# Patient Record
Sex: FEMALE | Race: WHITE | NOT HISPANIC OR LATINO | Employment: OTHER | URBAN - METROPOLITAN AREA
[De-identification: names, ages, dates, MRNs, and addresses within clinical notes are randomized per-mention and may not be internally consistent; named-entity substitution may affect disease eponyms.]

---

## 2017-01-03 ENCOUNTER — TRANSCRIBE ORDERS (OUTPATIENT)
Dept: ADMINISTRATIVE | Facility: HOSPITAL | Age: 62
End: 2017-01-03

## 2017-01-03 DIAGNOSIS — M25.561 RIGHT KNEE PAIN, UNSPECIFIED CHRONICITY: Primary | ICD-10-CM

## 2017-01-03 DIAGNOSIS — M25.469 KNEE SWELLING: ICD-10-CM

## 2017-01-06 ENCOUNTER — GENERIC CONVERSION - ENCOUNTER (OUTPATIENT)
Dept: OTHER | Facility: OTHER | Age: 62
End: 2017-01-06

## 2017-01-10 ENCOUNTER — ALLSCRIPTS OFFICE VISIT (OUTPATIENT)
Dept: OTHER | Facility: OTHER | Age: 62
End: 2017-01-10

## 2017-01-10 DIAGNOSIS — Z11.59 ENCOUNTER FOR SCREENING FOR OTHER VIRAL DISEASES: ICD-10-CM

## 2017-01-10 DIAGNOSIS — L43.9 LICHEN PLANUS: ICD-10-CM

## 2017-03-23 ENCOUNTER — GENERIC CONVERSION - ENCOUNTER (OUTPATIENT)
Dept: OTHER | Facility: OTHER | Age: 62
End: 2017-03-23

## 2017-03-23 DIAGNOSIS — Z12.39 ENCOUNTER FOR OTHER SCREENING FOR MALIGNANT NEOPLASM OF BREAST: ICD-10-CM

## 2017-07-10 DIAGNOSIS — E78.5 HYPERLIPIDEMIA: ICD-10-CM

## 2017-07-10 DIAGNOSIS — E55.9 VITAMIN D DEFICIENCY: ICD-10-CM

## 2017-07-10 DIAGNOSIS — I10 ESSENTIAL (PRIMARY) HYPERTENSION: ICD-10-CM

## 2017-07-10 DIAGNOSIS — Z11.59 ENCOUNTER FOR SCREENING FOR OTHER VIRAL DISEASES: ICD-10-CM

## 2017-07-10 DIAGNOSIS — F32.9 MAJOR DEPRESSIVE DISORDER, SINGLE EPISODE: ICD-10-CM

## 2017-07-21 ENCOUNTER — HOSPITAL ENCOUNTER (EMERGENCY)
Facility: HOSPITAL | Age: 62
Discharge: HOME | End: 2017-07-21
Attending: EMERGENCY MEDICINE
Payer: COMMERCIAL

## 2017-07-21 VITALS
BODY MASS INDEX: 30.73 KG/M2 | SYSTOLIC BLOOD PRESSURE: 171 MMHG | HEIGHT: 64 IN | OXYGEN SATURATION: 100 % | DIASTOLIC BLOOD PRESSURE: 84 MMHG | RESPIRATION RATE: 16 BRPM | HEART RATE: 69 BPM | TEMPERATURE: 97.2 F | WEIGHT: 180 LBS

## 2017-07-21 DIAGNOSIS — S01.111A LACERATION OF RIGHT EYEBROW: Primary | ICD-10-CM

## 2017-07-21 PROCEDURE — 99282 EMERGENCY DEPT VISIT SF MDM: CPT

## 2017-07-21 PROCEDURE — 90471 IMMUNIZATION ADMIN: CPT

## 2017-07-21 PROCEDURE — 90715 TDAP VACCINE 7 YRS/> IM: CPT | Performed by: PHYSICIAN ASSISTANT

## 2017-07-21 RX ORDER — LIDOCAINE HYDROCHLORIDE 10 MG/ML
10 INJECTION, SOLUTION EPIDURAL; INFILTRATION; INTRACAUDAL; PERINEURAL ONCE
Status: COMPLETED | OUTPATIENT
Start: 2017-07-21 | End: 2017-07-21

## 2017-07-21 RX ADMIN — LIDOCAINE HYDROCHLORIDE 10 ML: 10 INJECTION, SOLUTION EPIDURAL; INFILTRATION; INTRACAUDAL; PERINEURAL at 14:43

## 2017-07-21 RX ADMIN — TETANUS TOXOID, REDUCED DIPHTHERIA TOXOID AND ACELLULAR PERTUSSIS VACCINE, ADSORBED 0.5 ML: 5; 2.5; 8; 8; 2.5 SUSPENSION INTRAMUSCULAR at 14:44

## 2017-07-25 ENCOUNTER — HOSPITAL ENCOUNTER (EMERGENCY)
Facility: HOSPITAL | Age: 62
Discharge: HOME | End: 2017-07-25
Attending: EMERGENCY MEDICINE | Admitting: EMERGENCY MEDICINE
Payer: COMMERCIAL

## 2017-07-25 VITALS
HEIGHT: 64 IN | SYSTOLIC BLOOD PRESSURE: 159 MMHG | OXYGEN SATURATION: 100 % | BODY MASS INDEX: 30.73 KG/M2 | WEIGHT: 180 LBS | RESPIRATION RATE: 16 BRPM | TEMPERATURE: 98 F | HEART RATE: 54 BPM | DIASTOLIC BLOOD PRESSURE: 69 MMHG

## 2017-07-25 DIAGNOSIS — Z48.02 ENCOUNTER FOR REMOVAL OF SUTURES: Primary | ICD-10-CM

## 2017-07-25 PROCEDURE — 99281 EMR DPT VST MAYX REQ PHY/QHP: CPT

## 2017-10-26 ENCOUNTER — GENERIC CONVERSION - ENCOUNTER (OUTPATIENT)
Dept: OTHER | Facility: OTHER | Age: 62
End: 2017-10-26

## 2017-12-26 ENCOUNTER — HOSPITAL ENCOUNTER (EMERGENCY)
Facility: HOSPITAL | Age: 62
Discharge: HOME | End: 2017-12-26
Attending: EMERGENCY MEDICINE | Admitting: EMERGENCY MEDICINE
Payer: COMMERCIAL

## 2017-12-26 VITALS
HEART RATE: 61 BPM | RESPIRATION RATE: 14 BRPM | DIASTOLIC BLOOD PRESSURE: 79 MMHG | TEMPERATURE: 97.5 F | WEIGHT: 184 LBS | BODY MASS INDEX: 31.58 KG/M2 | SYSTOLIC BLOOD PRESSURE: 151 MMHG | OXYGEN SATURATION: 100 %

## 2017-12-26 DIAGNOSIS — S00.83XA CONTUSION OF OTHER PART OF HEAD, INITIAL ENCOUNTER: Primary | ICD-10-CM

## 2017-12-26 PROCEDURE — 99283 EMERGENCY DEPT VISIT LOW MDM: CPT

## 2017-12-26 RX ORDER — NICOTINE POLACRILEX 4 MG/1
GUM, CHEWING ORAL
COMMUNITY
Start: 2017-10-16 | End: 2019-02-04 | Stop reason: SDUPTHER

## 2017-12-26 RX ORDER — IBUPROFEN 600 MG/1
1 TABLET ORAL EVERY 8 HOURS
COMMUNITY
Start: 2016-08-18 | End: 2019-02-04 | Stop reason: CLARIF

## 2017-12-26 RX ORDER — AMLODIPINE BESYLATE AND BENAZEPRIL HYDROCHLORIDE 10; 20 MG/1; MG/1
1 CAPSULE ORAL DAILY
COMMUNITY
Start: 2013-03-05 | End: 2018-02-15 | Stop reason: SDUPTHER

## 2017-12-26 RX ORDER — MULTIVITAMIN
TABLET ORAL
COMMUNITY

## 2017-12-26 RX ORDER — ALCOHOL ANTISEPTIC PADS
PADS, MEDICATED (EA) TOPICAL
COMMUNITY

## 2017-12-26 RX ORDER — EPINEPHRINE 0.3 MG/.3ML
INJECTION SUBCUTANEOUS
COMMUNITY

## 2017-12-26 RX ORDER — MULTIVIT-MIN/IRON/FOLIC ACID/K 18-600-40
2000 CAPSULE ORAL
COMMUNITY
End: 2019-07-29 | Stop reason: DRUGHIGH

## 2017-12-26 RX ORDER — FLUTICASONE PROPIONATE 50 MCG
SPRAY, SUSPENSION (ML) NASAL
COMMUNITY
Start: 2015-02-04

## 2017-12-26 NOTE — ED PROVIDER NOTES
History  Chief Complaint   Patient presents with    Head Injury     pt tripped over a plant and hit her head on a wine rack on saturday  pt states "I want to get it checked out because I still have swelling:     HPI    None       History reviewed  No pertinent past medical history  Past Surgical History:   Procedure Laterality Date    EYE SURGERY         History reviewed  No pertinent family history  I have reviewed and agree with the history as documented  Social History   Substance Use Topics    Smoking status: Never Smoker    Smokeless tobacco: Never Used    Alcohol use Yes        Review of Systems    Physical Exam  ED Triage Vitals [12/26/17 1455]   Temperature Pulse Respirations Blood Pressure SpO2   97 5 °F (36 4 °C) 62 16 (!) 193/87 99 %      Temp Source Heart Rate Source Patient Position - Orthostatic VS BP Location FiO2 (%)   Oral Monitor Sitting Left arm --      Pain Score       No Pain           Orthostatic Vital Signs  Vitals:    12/26/17 1455   BP: (!) 193/87   Pulse: 62   Patient Position - Orthostatic VS: Sitting       Physical Exam   Constitutional: She is oriented to person, place, and time  She appears well-developed and well-nourished  No distress  HENT:   Head: Normocephalic and atraumatic  Head is without raccoon's eyes, without Alexandre's sign, without abrasion, without contusion and without laceration  Mouth/Throat: No lacerations  No dental injuries   Eyes: Conjunctivae and EOM are normal  Pupils are equal, round, and reactive to light  Neck: Normal range of motion  No tracheal deviation present  Cardiovascular: Normal rate, regular rhythm and intact distal pulses  Pulmonary/Chest: Effort normal  No respiratory distress  Neurological: She is alert and oriented to person, place, and time  GCS eye subscore is 4  GCS verbal subscore is 5  GCS motor subscore is 6  Skin: Skin is warm and dry  Psychiatric: She has a normal mood and affect   Her behavior is normal  Nursing note and vitals reviewed  ED Medications  Medications - No data to display    Diagnostic Studies  Results Reviewed     None                 No orders to display              Procedures  Procedures       Phone Contacts  ED Phone Contact    ED Course  ED Course                                MDM  Number of Diagnoses or Management Options  Contusion of other part of head, initial encounter: new and does not require workup  Diagnosis management comments: This is a 80-year-old female presents here today for evaluation of a head injury  On 12/23 she tripped and hit the side of her head on the corner of a gram it topped wine cooler  She denies headache, loss of consciousness, nausea, vomiting, focal weakness or numbness, gait instability, visual changes, tinnitus, any other complaints  She had applied ice the first couple of days, and was taking occasional Tylenol and Aleve as needed for mild pain  She states she noticed today that it was still bruised and slightly swollen, and came for evaluation to make sure that everything was okay  She does not take any blood thinning medications  She has no bleeding dyscrasias  She did hit her left knee has been having mild pain there, but states this is not as concerning  She has no other complaints or injuries  ROS: Otherwise negative, unless stated as above  She is well appearing, in no acute distress  She has a small, yellowing contusion to her forehead without significant hematoma  She has no other signs of trauma  She has no focal neurologic deficits on exam  Given the length of time since the injury, mild mechanism, lack of any red flag symptoms, no worsening symptoms, I am not concerned about intracranial hemorrhage  I do not feel that she needs any imaging or other workup for this   I discussed with her that the external contusion will take a while to reabsorb, treatment at home, follow up with her primary care doctor, and indications for return, and she expresses understanding with this plan  CritCare Time    Disposition  Final diagnoses:   Contusion of other part of head, initial encounter     Time reflects when diagnosis was documented in both MDM as applicable and the Disposition within this note     Time User Action Codes Description Comment    12/26/2017  4:03 PM Jarad Collado Add [[id number]] Contusion of other part of head, initial encounter       ED Disposition     ED Disposition Condition Comment    Discharge  Dai Quinones discharge to home/self care  Condition at discharge: Good        Follow-up Information     Follow up With Specialties Details Why [address] [facility], DO Internal Medicine Schedule an appointment as soon as possible for a visit As needed to follow up [address] [place] [address] [phone]          Patient's Medications    No medications on file     No discharge procedures on file      ED Provider  Electronically Signed by           Dot Mehta MD  12/31/17 9898

## 2018-01-13 VITALS
HEIGHT: 64 IN | BODY MASS INDEX: 31.07 KG/M2 | SYSTOLIC BLOOD PRESSURE: 140 MMHG | TEMPERATURE: 98.5 F | OXYGEN SATURATION: 98 % | HEART RATE: 69 BPM | WEIGHT: 182 LBS | DIASTOLIC BLOOD PRESSURE: 85 MMHG

## 2018-01-18 ENCOUNTER — HOSPITAL ENCOUNTER (OUTPATIENT)
Dept: RADIOLOGY | Facility: HOSPITAL | Age: 63
Discharge: HOME | End: 2018-01-18
Attending: INTERNAL MEDICINE
Payer: COMMERCIAL

## 2018-01-18 ENCOUNTER — TRANSCRIBE ORDERS (OUTPATIENT)
Dept: ADMINISTRATIVE | Facility: HOSPITAL | Age: 63
End: 2018-01-18

## 2018-01-18 ENCOUNTER — APPOINTMENT (OUTPATIENT)
Dept: LAB | Facility: HOSPITAL | Age: 63
End: 2018-01-18
Attending: INTERNAL MEDICINE
Payer: COMMERCIAL

## 2018-01-18 ENCOUNTER — ALLSCRIPTS OFFICE VISIT (OUTPATIENT)
Dept: OTHER | Facility: OTHER | Age: 63
End: 2018-01-18

## 2018-01-18 ENCOUNTER — GENERIC CONVERSION - ENCOUNTER (OUTPATIENT)
Dept: OTHER | Facility: OTHER | Age: 63
End: 2018-01-18

## 2018-01-18 DIAGNOSIS — J10.1 INFLUENZA DUE TO OTHER IDENTIFIED INFLUENZA VIRUS WITH OTHER RESPIRATORY MANIFESTATIONS: ICD-10-CM

## 2018-01-18 DIAGNOSIS — F01.50 VASCULAR DEMENTIA WITH DEPRESSED MOOD (HCC): Primary | ICD-10-CM

## 2018-01-18 DIAGNOSIS — I10 ESSENTIAL (PRIMARY) HYPERTENSION: ICD-10-CM

## 2018-01-18 DIAGNOSIS — E87.1 HYPO-OSMOLALITY AND HYPONATREMIA (CODE): ICD-10-CM

## 2018-01-18 DIAGNOSIS — F01.50 VASCULAR DEMENTIA WITH DEPRESSED MOOD (HCC): ICD-10-CM

## 2018-01-18 DIAGNOSIS — R68.89 OTHER GENERAL SYMPTOMS AND SIGNS: ICD-10-CM

## 2018-01-18 DIAGNOSIS — E78.5 HYPERLIPIDEMIA: ICD-10-CM

## 2018-01-18 DIAGNOSIS — E55.9 VITAMIN D DEFICIENCY: ICD-10-CM

## 2018-01-18 DIAGNOSIS — Z11.59 ENCOUNTER FOR SCREENING FOR OTHER VIRAL DISEASES: ICD-10-CM

## 2018-01-18 DIAGNOSIS — R77.8 OTHER SPECIFIED ABNORMALITIES OF PLASMA PROTEINS: ICD-10-CM

## 2018-01-18 DIAGNOSIS — F32.A VASCULAR DEMENTIA WITH DEPRESSED MOOD (HCC): Primary | ICD-10-CM

## 2018-01-18 DIAGNOSIS — F32.9 MAJOR DEPRESSIVE DISORDER, SINGLE EPISODE: ICD-10-CM

## 2018-01-18 DIAGNOSIS — F32.A VASCULAR DEMENTIA WITH DEPRESSED MOOD (HCC): ICD-10-CM

## 2018-01-18 LAB
ALBUMIN SERPL BCP-MCNC: 4.1 G/DL (ref 3.5–5)
ALP SERPL-CCNC: 77 U/L (ref 46–116)
ALT SERPL W P-5'-P-CCNC: 34 U/L (ref 12–78)
ANION GAP SERPL CALCULATED.3IONS-SCNC: 11 MMOL/L (ref 4–13)
AST SERPL W P-5'-P-CCNC: 31 U/L (ref 5–45)
BASOPHILS # BLD AUTO: 0.02 THOUSANDS/ΜL (ref 0–0.1)
BASOPHILS NFR BLD AUTO: 0 % (ref 0–1)
BILIRUB SERPL-MCNC: 0.4 MG/DL (ref 0.2–1)
BUN SERPL-MCNC: 14 MG/DL (ref 5–25)
CALCIUM SERPL-MCNC: 8.9 MG/DL (ref 8.3–10.1)
CHLORIDE SERPL-SCNC: 97 MMOL/L (ref 100–108)
CO2 SERPL-SCNC: 27 MMOL/L (ref 21–32)
CREAT SERPL-MCNC: 0.96 MG/DL (ref 0.6–1.3)
EOSINOPHIL # BLD AUTO: 0.02 THOUSAND/ΜL (ref 0–0.61)
EOSINOPHIL NFR BLD AUTO: 0 % (ref 0–6)
ERYTHROCYTE [DISTWIDTH] IN BLOOD BY AUTOMATED COUNT: 12.6 % (ref 11.6–15.1)
GFR SERPL CREATININE-BSD FRML MDRD: 64 ML/MIN/1.73SQ M
GLUCOSE SERPL-MCNC: 112 MG/DL (ref 65–140)
HCT VFR BLD AUTO: 44.4 % (ref 34.8–46.1)
HGB BLD-MCNC: 14.8 G/DL (ref 11.5–15.4)
LYMPHOCYTES # BLD AUTO: 1.74 THOUSANDS/ΜL (ref 0.6–4.47)
LYMPHOCYTES NFR BLD AUTO: 32 % (ref 14–44)
MCH RBC QN AUTO: 29.7 PG (ref 26.8–34.3)
MCHC RBC AUTO-ENTMCNC: 33.3 G/DL (ref 31.4–37.4)
MCV RBC AUTO: 89 FL (ref 82–98)
MONOCYTES # BLD AUTO: 0.61 THOUSAND/ΜL (ref 0.17–1.22)
MONOCYTES NFR BLD AUTO: 11 % (ref 4–12)
NEUTROPHILS # BLD AUTO: 3.06 THOUSANDS/ΜL (ref 1.85–7.62)
NEUTS SEG NFR BLD AUTO: 56 % (ref 43–75)
NRBC BLD AUTO-RTO: 0 /100 WBCS
PLATELET # BLD AUTO: 170 THOUSANDS/UL (ref 149–390)
PMV BLD AUTO: 10.9 FL (ref 8.9–12.7)
POTASSIUM SERPL-SCNC: 3.8 MMOL/L (ref 3.5–5.3)
PROT SERPL-MCNC: 8.4 G/DL (ref 6.4–8.2)
RBC # BLD AUTO: 4.99 MILLION/UL (ref 3.81–5.12)
SODIUM SERPL-SCNC: 135 MMOL/L (ref 136–145)
TRIGL SERPL-MCNC: 89 MG/DL
TSH SERPL DL<=0.05 MIU/L-ACNC: 2.44 UIU/ML (ref 0.36–3.74)
WBC # BLD AUTO: 5.46 THOUSAND/UL (ref 4.31–10.16)

## 2018-01-18 PROCEDURE — 84443 ASSAY THYROID STIM HORMONE: CPT

## 2018-01-18 PROCEDURE — 80053 COMPREHEN METABOLIC PANEL: CPT

## 2018-01-18 PROCEDURE — 85025 COMPLETE CBC W/AUTO DIFF WBC: CPT

## 2018-01-18 PROCEDURE — 84478 ASSAY OF TRIGLYCERIDES: CPT

## 2018-01-18 PROCEDURE — 71046 X-RAY EXAM CHEST 2 VIEWS: CPT

## 2018-01-18 PROCEDURE — 36415 COLL VENOUS BLD VENIPUNCTURE: CPT

## 2018-01-19 ENCOUNTER — APPOINTMENT (OUTPATIENT)
Dept: LAB | Facility: HOSPITAL | Age: 63
End: 2018-01-19
Payer: COMMERCIAL

## 2018-01-19 DIAGNOSIS — R68.89 OTHER GENERAL SYMPTOMS AND SIGNS: ICD-10-CM

## 2018-01-19 LAB
FLUAV AG SPEC QL IA: NEGATIVE
FLUBV AG SPEC QL IA: NEGATIVE

## 2018-01-19 PROCEDURE — 87798 DETECT AGENT NOS DNA AMP: CPT

## 2018-01-19 PROCEDURE — 87400 INFLUENZA A/B EACH AG IA: CPT

## 2018-01-19 NOTE — PROGRESS NOTES
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    · Stop: EpiPen 2-Mic 0 3 MG/0 3ML Injection Solution Auto-injector  Atopic dermatitis    · Stop: Betamethasone Valerate 0 1 % External Cream  Claustrophobia, Screen for colon cancer    · Stop: LORazepam 1 MG Oral Tablet  Depression, Hyperlipidemia, Hypertension, PMH: Need for hepatitis C screening test,    Vitamin D deficiency    · (1) CBC/PLT/DIFF; Status:Active; Requested KNU:[id number];    · (1) COMPREHENSIVE METABOLIC PANEL; Status:Active; Requested for:18Jan2018;    · (1) LIPID PANEL FASTING W DIRECT LDL REFLEX; Status:Canceled;    · (LC) TSH Rfx on Abnormal to Free T4; Status:Active; Requested JVQ:[id number]; Flu-like symptoms    · Start: Oseltamivir Phosphate 75 MG Oral Capsule (Tamiflu); TAKE 1 CAPSULE TWICE    DAILY   · (1) LYME PCR; Status:Active; Requested for:18Jan2018;    · (1) RAPID INFLUENZA SCREEN RFLX PCR CHILD < 2 MOS; Status:Active; Requested    BUL:[id number];    · * XR CHEST PA & LATERAL; Status:Active; Requested LXB:[id number];    · Follow-up visit in 6 months Evaluation and Treatment  Follow-up  Status: Hold For -    Scheduling  Requested for: 57AWR1713  Health Maintenance    · Stop: Magnesium 250 MG Oral Tablet   · Stop: Vitamin D3 2000 UNIT Oral Capsule  Herpes simplex infection    · Renew: ValACYclovir HCl - 500 MG Oral Tablet; TAKE 1 TABLET DAILY  Hyperlipidemia    · (1) LDL,DIRECT; Status:Active; Requested DEU:[id number];    · (1) TRIGLYCERIDE; Status:Active;  Requested JAB:[id number];   LP (lichen planus)    · Stop: PredniSONE 10 MG Oral Tablet  PMH: Chronic maxillary sinusitis    · Changed: From  Veramyst 27 5 MCG/SPRAY SUSP USE 2 SPRAYS IN EACH NOSTRIL    ONCE DAILY To Flonase Sensimist 27 5 MCG/SPRAY Nasal Suspension USE 2    SPRAYS IN EACH NOSTRIL ONCE DAILY  Spasm of back muscles    · Stop: Ibuprofen 600 MG Oral Tablet    Discussion/Summary      1  Patient with flu-like symptoms and also abnormal breath sounds possibility of flu leading to pneumonia is raised will be getting chest x-ray laboratory work now will Rx empirically with Tamiflu low 75 twice a day as well as cefuroxime twice a day will avoid fluoroquinolones if possible if she continues to have fever and chills and has pneumonia will switch to Levaquin or add doxycycline to cefuroxime Hypertension is at goal Hyperlipidemia with multiple risk factors will be checking LDL today has refused statin in the past Patient with lichen planus in the mouth has been seeing a dermatologist at [facility] who would recommended a Plaquenil like drug will be getting records will see her back in 6 months she has been using tumor to little effect  The patient was counseled regarding diagnostic results,-- instructions for management,-- risk factor reductions,-- prognosis,-- impressions,-- risks and benefits of treatment options,-- importance of compliance with treatment  total time of encounter was 30 minutes-- and-- 20 minutes was spent counseling  Possible side effects of new medications were reviewed with the patient/guardian today  The treatment plan was reviewed with the patient/guardian  The patient/guardian understands and agrees with the treatment plan      Chief Complaint   Patient presents c/o sinus pressure, sneezing, fever, diarrhea,chills and weak  History of Present Illness   HPI: 3 days fever, chills , myalgias cough green sputum 100,5    Hypertension (Follow-Up): The patient presents for follow-up of primary hypertension   The patient states she has been doing well with her blood pressure control since the last visit  Symptoms: The patient is currently asymptomatic  Associated symptoms include no headache,-- no focal neurologic deficits-- and-- no memory loss  Medications: the patient is adherent with her medication regimen  -- She denies medication side effects  Hyperlipidemia (Follow-Up): Comorbid Illnesses: Has high HDL with a high LDL  Symptoms: The patient is currently asymptomatic  Associated symptoms include no focal neurologic deficits-- and-- no memory loss  Lifestyle: Diet: She consumes a diverse and healthy diet  Weight Issues: She has weight concerns  Exercise: She exercises regularly  Smoking: She does not use tobacco Drug Use: She denies drug use  Vitamin D Deficiency: Disease type: vitamin D deficiency  Review of Systems        Constitutional: No fever, no chills, feels well, no tiredness, no recent weight gain or weight loss  Eyes: No complaints of eye pain, no red eyes, no eyesight problems, no discharge, no dry eyes, no itching of eyes  ENT: no complaints of earache, no loss of hearing, no nose bleeds, no nasal discharge, no sore throat, no hoarseness-- and-- as noted in HPI  Cardiovascular: No complaints of slow heart rate, no fast heart rate, no chest pain, no palpitations, no leg claudication, no lower extremity edema  Respiratory: as noted in HPI1 -- 1   Gastrointestinal: neg [place] colonoscopy8-9 years ago, but-- No complaints of abdominal pain, no constipation, no nausea or vomiting, no diarrhea, no bloody stools  Genitourinary: No complaints of dysuria, no incontinence, no pelvic pain, no dysmenorrhea, no vaginal discharge or bleeding  Musculoskeletal: No complaints of arthralgias, no myalgias, no joint swelling or stiffness, no limb pain or swelling  Integumentary: as noted in HPI        Neurological: No complaints of headache, no confusion, no convulsions, no numbness, no dizziness or fainting, no tingling, no limb weakness, no difficulty walking  Psychiatric: Not suicidal, no sleep disturbance, no anxiety or depression, no change in personality, no emotional problems  Endocrine: No complaints of proptosis, no hot flashes, no muscle weakness, no deepening of the voice, no feelings of weakness  1 Amended By: Hilario Hatfield; Jan 18 2018 1:36 PM EST      Active Problems   1  Allergic rhinitis, seasonal (477 9) (J30 2)  2  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 (995 0)  3  Anxiety disorder (300 00) (F41 9)  4  Atopic dermatitis (691 8) (L20 9)  5  Atrophic vaginitis (627 3) (N95 2)  6  Claustrophobia (300 29) (F40 240)  7  Depression (311) (F32 9)  8  Elevated glucose (790 29) (R73 09)  9  Encounter for gynecological examination with Papanicolaou smear of cervix (V72 31)     (Z01 419)  10  Encounter for gynecological examination without abnormal finding (V72 31) (Z01 419)  11  Esophageal reflux (530 81) (K21 9)  12  Fibrocystic breast disease (610 1) (N60 19)  13  Flu vaccine need (V04 81) (Z23)  14  Herpes simplex infection (054 9) (B00 9)  15  Hyperlipidemia (272 4) (E78 5)  16  Hypertension (401 9) (I10)  17  Joint pain, knee (719 46) (M25 569)  18  Knee swelling (719 06) (P82 399)  19  LP (lichen planus) (901 1) (L43 9)  20  Need for DTaP vaccination (V06 1) (Z23)  21  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)  22  Raynaud's disease, idiopathic (443 0) (I73 00)  23  Right knee pain (719 46) (M25 561)  24  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)  25  Screen for colon cancer (V76 51) (Z12 11)  26  Screening for breast cancer (V76 10) (Z12 31)  27  Screening for colon cancer (V76 51) (Z12 11)  28  Screening for HPV (human papillomavirus) (V73 81) (Z11 51)  29  Spasm of back muscles (724 8) (M62 830)  30  Tics of organic origin (333 3) (G25 69)  31  Vitamin D deficiency (268 9) (E55 9)    Past Medical History   Active Problems And Past Medical History Reviewed:     The active problems and past medical history were reviewed and updated today  Surgical History   Surgical History Reviewed: The surgical history was reviewed and updated today  Social History    · Alcohol Use (History)   · Caffeine Use   · Never A Smoker  The social history was reviewed and updated today  The social history was reviewed and is unchanged  Family History   Family History Reviewed: The family history was reviewed and updated today  Current Meds   1  Amlodipine Besy-Benazepril HCl - 10-20 MG Oral Capsule; take 1 capsule by mouth     once daily; Therapy: 15FTD1694 to (Evaluate:42Uln9418)  Requested for: 56ZDG7885; Last     Rx:19Dec2017 Ordered  2  Betamethasone Valerate 0 1 % External Cream; APPLY TO AFFECTED AREA ONCE     DAILY; Therapy: 52DHM6177 to (Evaluate:11Mar2017)  Requested for: 67YZM8956; Last     Rx:10Jan2017 Ordered  3  Clobetasol Propionate 0 05 % External Ointment; APPLY SPARINGLY TO AFFECTED     AREA(S) ONCE DAILY; Therapy: 73EFG1574 to (Last Rx:10Jan2017)  Requested for: 52ZLP7610 Ordered  4  Cortisporin-TC 3 3-3-10-0 5 MG/ML SUSP; Instill 3 drops in affected ear 3-4 times daily; Therapy: 11JPA9274 to (Last Rx:10Nov2015)  Requested for: 23DJV7247 Ordered  5  EPINEPHrine 0 3 MG/0 3ML Injection Solution Auto-injector; take im prn anaphylaxis      Requested for: 03LQZ3206; Last Rx:11Jun2014 Ordered  6  EpiPen 2-Mic 0 3 MG/0 3ML Injection Solution Auto-injector; TAKE INTRAMUSCULARLY     AS NEEDED ANAPHYLAXIS; Therapy: 76DQQ9025 to [phone]  Requested for: 24Oct2017; Last     Rx:24Oct2017 Ordered  7  Estrace 0 1 MG/GM Vaginal Cream; One gram per vagina twice weekly; Therapy: 09Wim7874 to (Evaluate:11Mar2017)  Requested for: 29NUW6007; Last     Rx:10Jan2017 Ordered  8  Fluticasone Propionate 50 MCG/ACT Nasal Suspension; SPRAY 2 SPRAY INTO BOTH     NOSTRILS ONCE A DAY AS DIRECTED; Therapy: 57AOK1641 to (Last Rx:19Jan2017)  Requested for: 49FGR4274 Ordered  9   Ibuprofen 600 MG Oral Tablet; TAKE 1 TABLET 3 TIMES DAILY WITH FOOD AS NEEDED; Therapy: 60NDT7460 to (Evaluate:71Prt9290)  Requested for: 58YBR5337; Last     Rx:09Nov2016 Ordered  10  LORazepam 1 MG Oral Tablet; 1 tab 30 min before procedure; Therapy: 63DUL8165 to (Last Rx:10Jan2017) Ordered  11  Magnesium 250 MG Oral Tablet; TAKE 1 TABLET DAILY; Therapy: 77ONR2385 to Recorded  12  Multi-Vitamin TABS; TAKE 1 TABLET DAILY; Therapy: (Recorded:18Jun2013) to Recorded  13  Omeprazole 20 MG Oral Capsule Delayed Release; take 1 capsule daily; Therapy: 36DAQ2947 to (Evaluate:14Apr2018)  Requested for: 91NZZ2981; Last      Rx:16Oct2017 Ordered  14  PredniSONE 10 MG Oral Tablet; TAKE 2 daily for 5 days then 1 daily for 5 days then stop; Therapy: 35FQO2880 to (Evaluate:11Mar2017)  Requested for: 06OEB5064; Last      Rx:10Jan2017 Ordered  15  ValACYclovir HCl - 500 MG Oral Tablet; TAKE 1 TABLET DAILY; Therapy: 12DBW1571 to (Evaluate:09Jul2017)  Requested for: 01NUL9116; Last      Rx:10Jan2017 Ordered  16  Veramyst 27 5 MCG/SPRAY SUSP; USE 2 SPRAYS IN EACH NOSTRIL ONCE DAILY; Therapy: 25SPJ9106 to (Evaluate:14Jan2018)  Requested for: 33XHG2149; Last      Rx:19Jan2017 Ordered  17  Vitamin B Complex Oral Tablet; TAKE 1 TABLET DAILY; Therapy: (Recorded:18Jun2013) to Recorded  18  Vitamin C 500 MG Oral Tablet; TAKE 1 TABLET DAILY; Therapy: (Recorded:18Jun2013) to Recorded  19  Vitamin D3 2000 UNIT Oral Capsule; TAKE 1 CAPSULE Daily; Therapy: (Recorded:18Jun2013) to Recorded  20  Vitamin E 600 UNIT Oral Capsule; TAKE 1 CAPSULE DAILY; Therapy: 77Rya0856 to Recorded     The medication list was reviewed and updated today  Allergies   1  Erythromycin TABS  2   Bee sting    Vitals    Recorded: [id number] 01:22PM Recorded: 92KIY0559 01:01PM   Temperature  98 5 F   Heart Rate  70   Systolic 140, LUE, Sitting    Diastolic 80, LUE, Sitting    Height  5 ft 4 in   Weight  191 lb    BMI Calculated 32 79   BSA Calculated  1 92   O2 Saturation  96     Physical Exam        Constitutional      General appearance: Abnormal   acutely ill-- and-- overweight  Ears, Nose, Mouth, and Throat External auditory canal bilaterally has lumps but no signs of infection tympanic membranes were normal       Nasal mucosa, septum, and turbinates: Normal without edema or erythema  -- Lichen planus on her left cheek inside  Neck      Neck: Supple, symmetric, trachea midline, no masses  Thyroid: Normal, no thyromegaly  Pulmonary      Respiratory effort: No increased work of breathing or signs of respiratory distress  Auscultation of lungs: Abnormal  -- (diffuse ronchi)      Cardiovascular      Auscultation of heart: Normal rate and rhythm, normal S1 and S2, no murmurs  Carotid pulses: 2+ bilaterally  Examination of extremities for edema and/or varicosities: Normal        Lymphatic      Palpation of lymph nodes in neck: No lymphadenopathy  Musculoskeletal No real medial joint tenderness range of motion of the right knee was normal no clicking or crepitance she did have pain over the tibial plateau medially  Joints, bones, and muscles: Normal        Skin Reticulated white lesions in her cheeks bilaterally  -- Lesions on her legs and small of her back  Psychiatric      Judgment and insight: Normal        Orientation to person, place, and time: Normal        Recent and remote memory: Intact         Mood and affect: Normal        Signatures    Electronically signed by : Ed Li DO; Jan 18 2018  1:37PM EST                       (Author)

## 2018-01-20 LAB
FLUAV AG SPEC QL: ABNORMAL
FLUBV AG SPEC QL: DETECTED
RSV B RNA SPEC QL NAA+PROBE: ABNORMAL

## 2018-01-22 ENCOUNTER — TRANSCRIBE ORDERS (OUTPATIENT)
Dept: ADMINISTRATIVE | Facility: HOSPITAL | Age: 63
End: 2018-01-22

## 2018-01-22 ENCOUNTER — APPOINTMENT (OUTPATIENT)
Dept: LAB | Facility: HOSPITAL | Age: 63
End: 2018-01-22
Attending: INTERNAL MEDICINE
Payer: COMMERCIAL

## 2018-01-22 DIAGNOSIS — R68.89 MECHANICAL AND MOTOR PROBLEMS WITH INTERNAL ORGANS: ICD-10-CM

## 2018-01-22 DIAGNOSIS — R68.89 MECHANICAL AND MOTOR PROBLEMS WITH INTERNAL ORGANS: Primary | ICD-10-CM

## 2018-01-22 PROCEDURE — 87476 LYME DIS DNA AMP PROBE: CPT

## 2018-01-22 PROCEDURE — 36415 COLL VENOUS BLD VENIPUNCTURE: CPT

## 2018-01-23 VITALS
HEART RATE: 70 BPM | DIASTOLIC BLOOD PRESSURE: 80 MMHG | TEMPERATURE: 98.5 F | WEIGHT: 191 LBS | OXYGEN SATURATION: 96 % | HEIGHT: 64 IN | SYSTOLIC BLOOD PRESSURE: 140 MMHG | BODY MASS INDEX: 32.61 KG/M2

## 2018-01-23 NOTE — PROGRESS NOTES
History of Present Illness  ED Outreach:   ED Visit Information  ED visit date: 12/26/2017   Diagnosis description: CONTUSION OF OTHER PART OF HEAD, INITIAL ENCOUNTER   Facility name: [address]    Discharge status: Disch: Home (Routine disch )   Number of ED visits to date: 0  In network  Outreach Information  Outreach successful  Number of attempts - 2  Date finalized: 01/18/2018  Care Coordination  Emergent necessity warranted by diagnosis  [facility] PCP  Self transport  Did not call PCP first  Feels able to call PCP for urgent problems  Understands what emergencies can be handled by PCP  Does not have problems getting appointment with PCP for minor emergency/urgency problems  Practice did not contact patient  Follow up appointment with PCP  Date and time of follow up: 01/18/2018  Not seen for follow up out of network  Patient went to ED instead of UC or PCP - proximity  Pt not admitted from ED  Patient transferred to PCP to schedule follow up appointments in network  Patient was not transferred to specialty provider to schedule follow up appointments in network  Patient without existing specialty follow up appointments in network  Comments:   I attempted to reach patient in regard to emergency room visit on 12/26/2017 for contusion of the head  1) [email or web address]  Pc with patient on [email or web address], She states that she is doing well from the head injury she sustained on 12/26/2017  However, she believes that she has the flu  I transferred Kimi Mcgee to Tim Hill at [facility]  Tim Hill indicated that the office has been trying to reach Kimi Mcgee without success  Signatures   Electronically signed by :  Ismael Saunders, ; Jan 19 2018  8:21AM EST                       (Author)

## 2018-01-24 NOTE — MISCELLANEOUS
Message    Please excuse our patient Kimmy Hernandez from work from 1/15/2018 - 1/22/2018  due to illness  Return to work or school:    1/22/2018   [place] Marco A is under my professional care   She was seen in my office on [phone]             Signatures   Electronically signed by : Santi Gonzalez DO; Jan 23 2018  6:56PM EST

## 2018-01-25 LAB — B BURGDOR DNA SPEC QL NAA+PROBE: NEGATIVE

## 2018-02-15 DIAGNOSIS — I10 HYPERTENSION, UNSPECIFIED TYPE: Primary | ICD-10-CM

## 2018-02-15 RX ORDER — AMLODIPINE BESYLATE AND BENAZEPRIL HYDROCHLORIDE 10; 20 MG/1; MG/1
1 CAPSULE ORAL DAILY
Qty: 30 CAPSULE | Refills: 5 | Status: SHIPPED | OUTPATIENT
Start: 2018-02-15 | End: 2018-08-23 | Stop reason: SDUPTHER

## 2018-04-16 ENCOUNTER — TELEPHONE (OUTPATIENT)
Dept: INTERNAL MEDICINE CLINIC | Facility: CLINIC | Age: 63
End: 2018-04-16

## 2018-04-16 NOTE — TELEPHONE ENCOUNTER
Please do a new order for her pastora screening  appt on Thursday    The order in epic is old, prob wont work  We will fax it to [facility] when done       Thanks

## 2018-04-21 DIAGNOSIS — K21.9 GASTROESOPHAGEAL REFLUX DISEASE, ESOPHAGITIS PRESENCE NOT SPECIFIED: Primary | ICD-10-CM

## 2018-04-23 RX ORDER — OMEPRAZOLE 20 MG/1
CAPSULE, DELAYED RELEASE ORAL
Qty: 30 CAPSULE | Refills: 5 | Status: SHIPPED | OUTPATIENT
Start: 2018-04-23 | End: 2019-01-25 | Stop reason: SDUPTHER

## 2018-07-03 DIAGNOSIS — N95.1 VAGINAL DRYNESS, MENOPAUSAL: Primary | ICD-10-CM

## 2018-07-03 RX ORDER — ESTRADIOL 0.1 MG/G
CREAM VAGINAL
Qty: 42.5 G | Refills: 0 | Status: SHIPPED | OUTPATIENT
Start: 2018-07-03

## 2018-08-23 ENCOUNTER — OFFICE VISIT (OUTPATIENT)
Dept: INTERNAL MEDICINE CLINIC | Facility: CLINIC | Age: 63
End: 2018-08-23
Payer: COMMERCIAL

## 2018-08-23 VITALS
WEIGHT: 192 LBS | HEIGHT: 64 IN | DIASTOLIC BLOOD PRESSURE: 70 MMHG | BODY MASS INDEX: 32.78 KG/M2 | RESPIRATION RATE: 18 BRPM | SYSTOLIC BLOOD PRESSURE: 120 MMHG | OXYGEN SATURATION: 99 % | TEMPERATURE: 97.5 F | HEART RATE: 61 BPM

## 2018-08-23 DIAGNOSIS — E78.5 HYPERLIPIDEMIA, UNSPECIFIED HYPERLIPIDEMIA TYPE: ICD-10-CM

## 2018-08-23 DIAGNOSIS — Z91.030 ALLERGY TO BEE STING: ICD-10-CM

## 2018-08-23 DIAGNOSIS — I10 HYPERTENSION, UNSPECIFIED TYPE: ICD-10-CM

## 2018-08-23 DIAGNOSIS — F41.9 ANXIETY DISORDER, UNSPECIFIED TYPE: ICD-10-CM

## 2018-08-23 DIAGNOSIS — E55.9 VITAMIN D DEFICIENCY: ICD-10-CM

## 2018-08-23 DIAGNOSIS — L60.8 DEFORMITY OF TOENAIL: ICD-10-CM

## 2018-08-23 DIAGNOSIS — R73.09 ELEVATED GLUCOSE: ICD-10-CM

## 2018-08-23 DIAGNOSIS — H92.03 OTALGIA OF BOTH EARS: ICD-10-CM

## 2018-08-23 DIAGNOSIS — Z12.11 ENCOUNTER FOR SCREENING COLONOSCOPY: Primary | ICD-10-CM

## 2018-08-23 DIAGNOSIS — R77.8 ELEVATED TOTAL PROTEIN: ICD-10-CM

## 2018-08-23 DIAGNOSIS — E87.1 HYPONATREMIA: ICD-10-CM

## 2018-08-23 PROBLEM — F40.240 CLAUSTROPHOBIA: Status: ACTIVE | Noted: 2017-01-10

## 2018-08-23 PROBLEM — J30.2 ALLERGIC RHINITIS, SEASONAL: Status: ACTIVE | Noted: 2017-01-19

## 2018-08-23 PROBLEM — B00.2 HERPETIC GINGIVOSTOMATITIS: Status: ACTIVE | Noted: 2018-08-23

## 2018-08-23 PROCEDURE — 3074F SYST BP LT 130 MM HG: CPT | Performed by: NURSE PRACTITIONER

## 2018-08-23 PROCEDURE — 99214 OFFICE O/P EST MOD 30 MIN: CPT | Performed by: NURSE PRACTITIONER

## 2018-08-23 PROCEDURE — 3008F BODY MASS INDEX DOCD: CPT | Performed by: NURSE PRACTITIONER

## 2018-08-23 PROCEDURE — 3078F DIAST BP <80 MM HG: CPT | Performed by: NURSE PRACTITIONER

## 2018-08-23 RX ORDER — EPINEPHRINE 0.3 MG/.3ML
INJECTION SUBCUTANEOUS
COMMUNITY
End: 2018-08-23 | Stop reason: SDUPTHER

## 2018-08-23 RX ORDER — PROPRANOLOL/HYDROCHLOROTHIAZID 40 MG-25MG
TABLET ORAL
COMMUNITY

## 2018-08-23 RX ORDER — VALACYCLOVIR HYDROCHLORIDE 500 MG/1
1 TABLET, FILM COATED ORAL DAILY
COMMUNITY
Start: 2013-08-16 | End: 2019-01-26 | Stop reason: SDUPTHER

## 2018-08-23 RX ORDER — AMLODIPINE BESYLATE AND BENAZEPRIL HYDROCHLORIDE 10; 20 MG/1; MG/1
1 CAPSULE ORAL DAILY
COMMUNITY
Start: 2013-03-05 | End: 2019-06-28 | Stop reason: SDUPTHER

## 2018-08-23 RX ORDER — NICOTINE POLACRILEX 4 MG/1
20 GUM, CHEWING ORAL
COMMUNITY
End: 2019-02-04 | Stop reason: SDUPTHER

## 2018-08-23 RX ORDER — LORAZEPAM 1 MG/1
TABLET ORAL
COMMUNITY
Start: 2017-01-10 | End: 2018-08-23 | Stop reason: SDUPTHER

## 2018-08-23 RX ORDER — B-COMPLEX WITH VITAMIN C
1 TABLET ORAL DAILY
COMMUNITY
End: 2018-08-23 | Stop reason: SDUPTHER

## 2018-08-23 RX ORDER — MULTIVIT-MIN/IRON/FOLIC ACID/K 18-600-40
CAPSULE ORAL
COMMUNITY
End: 2018-08-23 | Stop reason: SDUPTHER

## 2018-08-23 RX ORDER — ASCORBIC ACID 500 MG
500 TABLET ORAL
COMMUNITY

## 2018-08-23 RX ORDER — CLOBETASOL PROPIONATE 0.5 MG/G
CREAM TOPICAL
Refills: 5 | COMMUNITY
Start: 2018-07-03 | End: 2021-07-21 | Stop reason: SDUPTHER

## 2018-08-23 RX ORDER — ESTRADIOL 0.1 MG/G
CREAM VAGINAL
COMMUNITY
Start: 2016-04-08 | End: 2019-02-04 | Stop reason: SDUPTHER

## 2018-08-23 RX ORDER — LORAZEPAM 1 MG/1
1 TABLET ORAL 2 TIMES DAILY
Qty: 15 TABLET | Refills: 0 | Status: SHIPPED | OUTPATIENT
Start: 2018-08-23

## 2018-08-23 RX ORDER — EPINEPHRINE 0.3 MG/.3ML
0.3 INJECTION SUBCUTANEOUS ONCE
Qty: 1 EACH | Refills: 0 | Status: SHIPPED | OUTPATIENT
Start: 2018-08-23 | End: 2019-02-04 | Stop reason: SDUPTHER

## 2018-08-23 NOTE — PATIENT INSTRUCTIONS
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

## 2018-08-23 NOTE — PROGRESS NOTES
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-referred to Dr Fernanda Prieto, ENT  Will be calling you with blood work results  Follow up with me as needed and Dr Chelsea Ferrer in six months, labs prior  Diagnoses and all orders for this visit:    Encounter for screening colonoscopy  -     Ambulatory referral to Gastroenterology; Future  -     LORazepam (ATIVAN) 1 mg tablet; Take 1 tablet (1 mg total) by mouth 2 (two) times a day  -     EPINEPHrine (EPIPEN) 0 3 mg/0 3 mL SOAJ; Inject 0 3 mL (0 3 mg total) into a muscle once for 1 dose  -     CBC and differential; Future  -     Comprehensive metabolic panel; Future  -     HEMOGLOBIN A1C W/ EAG ESTIMATION; Future  -     Lipid panel; Future  -     TSH, 3rd generation with Free T4 reflex; Future  -     Microalbumin / creatinine urine ratio; Future  -     Lyme disease, PCR; Future  -     Vitamin D 25 hydroxy; Future  -     Ambulatory Referral to Otolaryngology; Future  -     Ambulatory referral to Podiatry; Future    Allergy to bee sting  -     Ambulatory referral to Gastroenterology; Future  -     LORazepam (ATIVAN) 1 mg tablet; Take 1 tablet (1 mg total) by mouth 2 (two) times a day  -     EPINEPHrine (EPIPEN) 0 3 mg/0 3 mL SOAJ; Inject 0 3 mL (0 3 mg total) into a muscle once for 1 dose  -     CBC and differential; Future  -     Comprehensive metabolic panel; Future  -     HEMOGLOBIN A1C W/ EAG ESTIMATION; Future  -     Lipid panel; Future  -     TSH, 3rd generation with Free T4 reflex; Future  -     Microalbumin / creatinine urine ratio; Future  -     Lyme disease, PCR; Future  -     Vitamin D 25 hydroxy; Future  -     Ambulatory Referral to Otolaryngology; Future  -     Ambulatory referral to Podiatry;  Future    Anxiety disorder, unspecified type  -     Ambulatory referral to Gastroenterology; Future  -     LORazepam (ATIVAN) 1 mg tablet; Take 1 tablet (1 mg total) by mouth 2 (two) times a day  -     EPINEPHrine (EPIPEN) 0 3 mg/0 3 mL SOAJ; Inject 0 3 mL (0 3 mg total) into a muscle once for 1 dose  -     CBC and differential; Future  -     Comprehensive metabolic panel; Future  -     HEMOGLOBIN A1C W/ EAG ESTIMATION; Future  -     Lipid panel; Future  -     TSH, 3rd generation with Free T4 reflex; Future  -     Microalbumin / creatinine urine ratio; Future  -     Lyme disease, PCR; Future  -     Vitamin D 25 hydroxy; Future  -     Ambulatory Referral to Otolaryngology; Future  -     Ambulatory referral to Podiatry; Future    Hypertension, unspecified type  -     Ambulatory referral to Gastroenterology; Future  -     LORazepam (ATIVAN) 1 mg tablet; Take 1 tablet (1 mg total) by mouth 2 (two) times a day  -     EPINEPHrine (EPIPEN) 0 3 mg/0 3 mL SOAJ; Inject 0 3 mL (0 3 mg total) into a muscle once for 1 dose  -     CBC and differential; Future  -     Comprehensive metabolic panel; Future  -     HEMOGLOBIN A1C W/ EAG ESTIMATION; Future  -     Lipid panel; Future  -     TSH, 3rd generation with Free T4 reflex; Future  -     Microalbumin / creatinine urine ratio; Future  -     Lyme disease, PCR; Future  -     Vitamin D 25 hydroxy; Future  -     Ambulatory Referral to Otolaryngology; Future  -     Ambulatory referral to Podiatry; Future    Elevated glucose  -     Ambulatory referral to Gastroenterology; Future  -     LORazepam (ATIVAN) 1 mg tablet; Take 1 tablet (1 mg total) by mouth 2 (two) times a day  -     EPINEPHrine (EPIPEN) 0 3 mg/0 3 mL SOAJ; Inject 0 3 mL (0 3 mg total) into a muscle once for 1 dose  -     CBC and differential; Future  -     Comprehensive metabolic panel; Future  -     HEMOGLOBIN A1C W/ EAG ESTIMATION; Future  -     Lipid panel; Future  -     TSH, 3rd generation with Free T4 reflex; Future  -     Microalbumin / creatinine urine ratio;  Future  -     Lyme disease, PCR; Future  -     Vitamin D 25 hydroxy; Future  -     Ambulatory Referral to Otolaryngology; Future  -     Ambulatory referral to Podiatry; Future    Elevated total protein  -     Ambulatory referral to Gastroenterology; Future  -     LORazepam (ATIVAN) 1 mg tablet; Take 1 tablet (1 mg total) by mouth 2 (two) times a day  -     EPINEPHrine (EPIPEN) 0 3 mg/0 3 mL SOAJ; Inject 0 3 mL (0 3 mg total) into a muscle once for 1 dose  -     CBC and differential; Future  -     Comprehensive metabolic panel; Future  -     HEMOGLOBIN A1C W/ EAG ESTIMATION; Future  -     Lipid panel; Future  -     TSH, 3rd generation with Free T4 reflex; Future  -     Microalbumin / creatinine urine ratio; Future  -     Lyme disease, PCR; Future  -     Vitamin D 25 hydroxy; Future  -     Ambulatory Referral to Otolaryngology; Future  -     Ambulatory referral to Podiatry; Future    Hyperlipidemia, unspecified hyperlipidemia type  -     Ambulatory referral to Gastroenterology; Future  -     LORazepam (ATIVAN) 1 mg tablet; Take 1 tablet (1 mg total) by mouth 2 (two) times a day  -     EPINEPHrine (EPIPEN) 0 3 mg/0 3 mL SOAJ; Inject 0 3 mL (0 3 mg total) into a muscle once for 1 dose  -     CBC and differential; Future  -     Comprehensive metabolic panel; Future  -     HEMOGLOBIN A1C W/ EAG ESTIMATION; Future  -     Lipid panel; Future  -     TSH, 3rd generation with Free T4 reflex; Future  -     Microalbumin / creatinine urine ratio; Future  -     Lyme disease, PCR; Future  -     Vitamin D 25 hydroxy; Future  -     Ambulatory Referral to Otolaryngology; Future  -     Ambulatory referral to Podiatry; Future    Hyponatremia  -     Ambulatory referral to Gastroenterology; Future  -     LORazepam (ATIVAN) 1 mg tablet;  Take 1 tablet (1 mg total) by mouth 2 (two) times a day  -     EPINEPHrine (EPIPEN) 0 3 mg/0 3 mL SOAJ; Inject 0 3 mL (0 3 mg total) into a muscle once for 1 dose  -     CBC and differential; Future  -     Comprehensive metabolic panel; Future  -     HEMOGLOBIN A1C W/ EAG ESTIMATION; Future  -     Lipid panel; Future  -     TSH, 3rd generation with Free T4 reflex; Future  -     Microalbumin / creatinine urine ratio; Future  -     Lyme disease, PCR; Future  -     Vitamin D 25 hydroxy; Future  -     Ambulatory Referral to Otolaryngology; Future  -     Ambulatory referral to Podiatry; Future    Vitamin D deficiency  -     Ambulatory referral to Gastroenterology; Future  -     LORazepam (ATIVAN) 1 mg tablet; Take 1 tablet (1 mg total) by mouth 2 (two) times a day  -     EPINEPHrine (EPIPEN) 0 3 mg/0 3 mL SOAJ; Inject 0 3 mL (0 3 mg total) into a muscle once for 1 dose  -     CBC and differential; Future  -     Comprehensive metabolic panel; Future  -     HEMOGLOBIN A1C W/ EAG ESTIMATION; Future  -     Lipid panel; Future  -     TSH, 3rd generation with Free T4 reflex; Future  -     Microalbumin / creatinine urine ratio; Future  -     Lyme disease, PCR; Future  -     Vitamin D 25 hydroxy; Future  -     Ambulatory Referral to Otolaryngology; Future  -     Ambulatory referral to Podiatry; Future    Otalgia of both ears  -     Ambulatory referral to Gastroenterology; Future  -     LORazepam (ATIVAN) 1 mg tablet; Take 1 tablet (1 mg total) by mouth 2 (two) times a day  -     EPINEPHrine (EPIPEN) 0 3 mg/0 3 mL SOAJ; Inject 0 3 mL (0 3 mg total) into a muscle once for 1 dose  -     CBC and differential; Future  -     Comprehensive metabolic panel; Future  -     HEMOGLOBIN A1C W/ EAG ESTIMATION; Future  -     Lipid panel; Future  -     TSH, 3rd generation with Free T4 reflex; Future  -     Microalbumin / creatinine urine ratio; Future  -     Lyme disease, PCR; Future  -     Vitamin D 25 hydroxy; Future  -     Ambulatory Referral to Otolaryngology; Future  -     Ambulatory referral to Podiatry; Future    Deformity of toenail  -     Ambulatory referral to Gastroenterology; Future  -     LORazepam (ATIVAN) 1 mg tablet;  Take 1 tablet (1 mg total) by mouth 2 (two) times a day  -     EPINEPHrine (EPIPEN) 0 3 mg/0 3 mL SOAJ; Inject 0 3 mL (0 3 mg total) into a muscle once for 1 dose  -     CBC and differential; Future  -     Comprehensive metabolic panel; Future  -     HEMOGLOBIN A1C W/ EAG ESTIMATION; Future  -     Lipid panel; Future  -     TSH, 3rd generation with Free T4 reflex; Future  -     Microalbumin / creatinine urine ratio; Future  -     Lyme disease, PCR; Future  -     Vitamin D 25 hydroxy; Future  -     Ambulatory Referral to Otolaryngology; Future  -     Ambulatory referral to Podiatry; Future    Other orders  -     ascorbic acid (VITAMIN C) 500 mg tablet; Take 500 mg by mouth  -     clobetasol (TEMOVATE) 0 05 % cream; APPLY TOPICALLY TO AFFECTED AREA 2 TIMES A DAY, ARMS/LEGS FOR UP TO 2 WEEKS THEN 2-3 DAYS PER WEEK  -     neomycin-colistin-hydrocortisone-thonzonium (CORTISPORIN-TC) 0 33-0 3-1-0 05 % otic suspension; Administer into ears  -     Discontinue: fluticasone (FLONASE SENSIMIST) 27 5 MCG/SPRAY nasal spray; 2 sprays into each nostril daily  -     Discontinue: LORazepam (ATIVAN) 1 mg tablet; Take by mouth  -     Omeprazole 20 MG TBEC; Take 20 mg by mouth  -     valACYclovir (VALTREX) 500 mg tablet; Take 1 tablet by mouth daily  -     Discontinue: B Complex Vitamins (VITAMIN B COMPLEX) TABS; Take 1 tablet by mouth daily  -     amLODIPine-benazepril (LOTREL) 10-20 MG per capsule; Take 1 capsule by mouth daily  -     betamethasone valerate (VALISONE) 0 1 % cream; daily  To affected area once daily   -     Discontinue: Cholecalciferol (VITAMIN D) 2000 units CAPS; Take by mouth  -     Discontinue: EPINEPHrine (EPIPEN) 0 3 mg/0 3 mL SOAJ; Inject as directed  -     estradiol (ESTRACE VAGINAL) 0 1 mg/g vaginal cream; Insert into the vagina  -     Turmeric 500 MG CAPS;  Take by mouth        The patient was counseled regarding instructions for management, risk factor reductions, patient and family education,impressions, risks and benefits of treatment options, side effects of medications, importance of compliance with treatment  The treatment plan was reviewed with the patient/guardian and patient/guardian understands and agrees with the treatment plan  Current Outpatient Prescriptions:     amLODIPine-benazepril (LOTREL) 10-20 MG per capsule, Take 1 capsule by mouth daily, Disp: , Rfl:     betamethasone valerate (VALISONE) 0 1 % cream, daily  To affected area once daily  , Disp: , Rfl:     estradiol (ESTRACE VAGINAL) 0 1 mg/g vaginal cream, Insert into the vagina, Disp: , Rfl:     LORazepam (ATIVAN) 1 mg tablet, Take 1 tablet (1 mg total) by mouth 2 (two) times a day, Disp: 15 tablet, Rfl: 0    neomycin-colistin-hydrocortisone-thonzonium (CORTISPORIN-TC) 0 33-0 3-1-0 05 % otic suspension, Administer into ears, Disp: , Rfl:     Turmeric 500 MG CAPS, Take by mouth, Disp: , Rfl:     valACYclovir (VALTREX) 500 mg tablet, Take 1 tablet by mouth daily, Disp: , Rfl:     ascorbic acid (VITAMIN C) 500 mg tablet, Take 500 mg by mouth, Disp: , Rfl:     B Complex-Biotin-FA (HM VITAMIN B100 COMPLEX) TABS, Take by mouth, Disp: , Rfl:     betamethasone valerate (VALISONE) 0 1 % cream, daily  To affected area once daily  , Disp: , Rfl:     Calcium Carb-Cholecalciferol 600-800 MG-UNIT TABS, Take 1 tablet by mouth, Disp: , Rfl:     Cholecalciferol (VITAMIN D) 2000 units CAPS, Take 2,000 Units by mouth, Disp: , Rfl:     clobetasol (TEMOVATE) 0 05 % cream, APPLY TOPICALLY TO AFFECTED AREA 2 TIMES A DAY, ARMS/LEGS FOR UP TO 2 WEEKS THEN 2-3 DAYS PER WEEK, Disp: , Rfl: 5    EPINEPHrine (EPIPEN) 0 3 mg/0 3 mL SOAJ, Inject as directed, Disp: , Rfl:     EPINEPHrine (EPIPEN) 0 3 mg/0 3 mL SOAJ, Inject 0 3 mL (0 3 mg total) into a muscle once for 1 dose, Disp: 1 each, Rfl: 0    ESTRACE VAGINAL 0 1 MG/GM vaginal cream, ONE GRAM PER VAGINA TWICE WEEKLY, Disp: 42 5 g, Rfl: 0    fluticasone (FLONASE) 50 mcg/act nasal spray, into each nostril, Disp: , Rfl:   ibuprofen (MOTRIN) 600 mg tablet, Take 1 tablet by mouth every 8 (eight) hours, Disp: , Rfl:     Multiple Vitamin (MULTI-VITAMIN DAILY) TABS, Take by mouth, Disp: , Rfl:     omeprazole (PriLOSEC) 20 mg delayed release capsule, TAKE 1 CAPSULE DAILY, Disp: 30 capsule, Rfl: 5    Omeprazole 20 MG TBEC, Take by mouth, Disp: , Rfl:     Omeprazole 20 MG TBEC, Take 20 mg by mouth, Disp: , Rfl:     Subjective:      Patient ID: Delroy Floyd is a 58 y o  female  Angela Valenzuela is here for follow up  She had blood work done today, we are waiting for results  She started a diet for weight loss a few weeks ago  She saw a dermatologist last year who diagnosed her with lichen planus and she is currently using topical cream which is helping a little bit  The following portions of the patient's history were reviewed and updated as appropriate:   She has a past medical history of Abnormal mammogram; Chest pain; Chronic maxillary sinusitis; Fatty liver; Lyme disease; and Tick bites  ,   does not have any pertinent problems on file  ,   has a past surgical history that includes Eye surgery  ,  family history includes Coronary artery disease in her father  ,   reports that she has never smoked  She has never used smokeless tobacco  She reports that she drinks alcohol  She reports that she does not use drugs  ,  is allergic to bee venom and erythromycin       Review of Systems   Constitutional: Negative  HENT: Positive for ear pain  Respiratory: Negative  Cardiovascular: Negative  Musculoskeletal: Negative  Skin: Positive for rash  Psychiatric/Behavioral: Negative  Objective:  /70   Pulse 61   Temp 97 5 °F (36 4 °C)   Resp 18   Ht 5' 4" (1 626 m)   Wt 87 1 kg (192 lb)   SpO2 99%   BMI 32 96 kg/m²     Lab Review  not applicable     Imaging: No results found  Physical Exam   Constitutional: She is oriented to person, place, and time  She appears well-developed and well-nourished     HENT: B/L ears-external canals with overgrowth of tissue  TMs show no sign of infection  Cardiovascular: Normal rate, regular rhythm, normal heart sounds and intact distal pulses  Pulmonary/Chest: Effort normal and breath sounds normal    Musculoskeletal: Normal range of motion  Neurological: She is alert and oriented to person, place, and time  She has normal reflexes  Skin: Rash noted  Psychiatric: She has a normal mood and affect   Her behavior is normal  Judgment and thought content normal

## 2018-08-28 LAB
ALBUMIN SERPL-MCNC: 4.3 G/DL (ref 3.6–4.8)
ALBUMIN/GLOB SERPL: 1.5 {RATIO} (ref 1.2–2.2)
ALP SERPL-CCNC: 56 IU/L (ref 39–117)
ALT SERPL-CCNC: 16 IU/L (ref 0–32)
AST SERPL-CCNC: 17 IU/L (ref 0–40)
B BURGDOR DNA SPEC QL NAA+PROBE: NEGATIVE
BASOPHILS # BLD AUTO: 0 X10E3/UL (ref 0–0.2)
BASOPHILS NFR BLD AUTO: 1 %
BILIRUB SERPL-MCNC: 0.2 MG/DL (ref 0–1.2)
BUN SERPL-MCNC: 15 MG/DL (ref 8–27)
BUN/CREAT SERPL: 17 (ref 12–28)
CALCIUM SERPL-MCNC: 9.5 MG/DL (ref 8.7–10.3)
CHLORIDE SERPL-SCNC: 101 MMOL/L (ref 96–106)
CHOLEST SERPL-MCNC: 236 MG/DL (ref 100–199)
CO2 SERPL-SCNC: 23 MMOL/L (ref 20–29)
CREAT SERPL-MCNC: 0.86 MG/DL (ref 0.57–1)
EOSINOPHIL # BLD AUTO: 0.2 X10E3/UL (ref 0–0.4)
EOSINOPHIL NFR BLD AUTO: 3 %
ERYTHROCYTE [DISTWIDTH] IN BLOOD BY AUTOMATED COUNT: 13.3 % (ref 12.3–15.4)
GLOBULIN SER-MCNC: 2.8 G/DL (ref 1.5–4.5)
GLUCOSE SERPL-MCNC: 112 MG/DL (ref 65–99)
HCT VFR BLD AUTO: 39.9 % (ref 34–46.6)
HDLC SERPL-MCNC: 79 MG/DL
HGB BLD-MCNC: 13.1 G/DL (ref 11.1–15.9)
IMM GRANULOCYTES # BLD: 0 X10E3/UL (ref 0–0.1)
IMM GRANULOCYTES NFR BLD: 0 %
LDLC SERPL CALC-MCNC: 145 MG/DL (ref 0–99)
LDLC SERPL DIRECT ASSAY-MCNC: 158 MG/DL (ref 0–99)
LYMPHOCYTES # BLD AUTO: 2.4 X10E3/UL (ref 0.7–3.1)
LYMPHOCYTES NFR BLD AUTO: 41 %
MCH RBC QN AUTO: 29.7 PG (ref 26.6–33)
MCHC RBC AUTO-ENTMCNC: 32.8 G/DL (ref 31.5–35.7)
MCV RBC AUTO: 91 FL (ref 79–97)
MONOCYTES # BLD AUTO: 0.5 X10E3/UL (ref 0.1–0.9)
MONOCYTES NFR BLD AUTO: 9 %
NEUTROPHILS # BLD AUTO: 2.8 X10E3/UL (ref 1.4–7)
NEUTROPHILS NFR BLD AUTO: 46 %
PLATELET # BLD AUTO: 245 X10E3/UL (ref 150–379)
POTASSIUM SERPL-SCNC: 4.4 MMOL/L (ref 3.5–5.2)
PROT SERPL-MCNC: 7.1 G/DL (ref 6–8.5)
RBC # BLD AUTO: 4.41 X10E6/UL (ref 3.77–5.28)
SL AMB EGFR AFRICAN AMERICAN: 84 ML/MIN/1.73
SL AMB EGFR NON AFRICAN AMERICAN: 73 ML/MIN/1.73
SL AMB VLDL CHOLESTEROL CALC: 12 MG/DL (ref 5–40)
SODIUM SERPL-SCNC: 139 MMOL/L (ref 134–144)
TRIGL SERPL-MCNC: 58 MG/DL (ref 0–149)
TSH SERPL DL<=0.005 MIU/L-ACNC: 2.38 UIU/ML (ref 0.45–4.5)
WBC # BLD AUTO: 6 X10E3/UL (ref 3.4–10.8)

## 2018-09-19 DIAGNOSIS — I10 HYPERTENSION, UNSPECIFIED TYPE: ICD-10-CM

## 2018-09-19 RX ORDER — AMLODIPINE BESYLATE AND BENAZEPRIL HYDROCHLORIDE 10; 20 MG/1; MG/1
1 CAPSULE ORAL DAILY
Qty: 30 CAPSULE | Refills: 5 | Status: SHIPPED | OUTPATIENT
Start: 2018-09-19 | End: 2019-02-04 | Stop reason: SDUPTHER

## 2018-10-17 DIAGNOSIS — L20.9 ATOPIC DERMATITIS, UNSPECIFIED TYPE: ICD-10-CM

## 2018-10-17 DIAGNOSIS — J30.2 SEASONAL ALLERGIC RHINITIS, UNSPECIFIED TRIGGER: Primary | ICD-10-CM

## 2018-11-29 DIAGNOSIS — L20.9 ATOPIC DERMATITIS, UNSPECIFIED TYPE: ICD-10-CM

## 2018-12-30 DIAGNOSIS — L20.9 ATOPIC DERMATITIS, UNSPECIFIED TYPE: ICD-10-CM

## 2019-01-25 DIAGNOSIS — K21.9 GASTROESOPHAGEAL REFLUX DISEASE, ESOPHAGITIS PRESENCE NOT SPECIFIED: ICD-10-CM

## 2019-01-26 DIAGNOSIS — B00.9 HERPES SIMPLEX INFECTION: Primary | ICD-10-CM

## 2019-01-29 RX ORDER — OMEPRAZOLE 20 MG/1
CAPSULE, DELAYED RELEASE ORAL
Qty: 30 CAPSULE | Refills: 5 | Status: SHIPPED | OUTPATIENT
Start: 2019-01-29 | End: 2019-09-11 | Stop reason: SDUPTHER

## 2019-01-30 RX ORDER — VALACYCLOVIR HYDROCHLORIDE 500 MG/1
TABLET, FILM COATED ORAL
Qty: 30 TABLET | Refills: 4 | Status: SHIPPED | OUTPATIENT
Start: 2019-01-30 | End: 2019-06-28 | Stop reason: ALTCHOICE

## 2019-02-04 ENCOUNTER — OFFICE VISIT (OUTPATIENT)
Dept: INTERNAL MEDICINE CLINIC | Facility: CLINIC | Age: 64
End: 2019-02-04
Payer: COMMERCIAL

## 2019-02-04 VITALS
OXYGEN SATURATION: 98 % | DIASTOLIC BLOOD PRESSURE: 70 MMHG | BODY MASS INDEX: 30.63 KG/M2 | SYSTOLIC BLOOD PRESSURE: 130 MMHG | TEMPERATURE: 99.6 F | WEIGHT: 179.4 LBS | HEART RATE: 68 BPM | HEIGHT: 64 IN | RESPIRATION RATE: 18 BRPM

## 2019-02-04 DIAGNOSIS — J11.1 INFLUENZA: Primary | ICD-10-CM

## 2019-02-04 LAB
FLUAV AG SPEC QL: NOT DETECTED
FLUBV AG SPEC QL: NOT DETECTED
RSV B RNA SPEC QL NAA+PROBE: NOT DETECTED

## 2019-02-04 PROCEDURE — 87631 RESP VIRUS 3-5 TARGETS: CPT | Performed by: NURSE PRACTITIONER

## 2019-02-04 PROCEDURE — 99213 OFFICE O/P EST LOW 20 MIN: CPT | Performed by: NURSE PRACTITIONER

## 2019-02-04 PROCEDURE — 1036F TOBACCO NON-USER: CPT | Performed by: NURSE PRACTITIONER

## 2019-02-04 PROCEDURE — 3008F BODY MASS INDEX DOCD: CPT | Performed by: NURSE PRACTITIONER

## 2019-02-04 RX ORDER — OSELTAMIVIR PHOSPHATE 75 MG/1
75 CAPSULE ORAL 2 TIMES DAILY
Qty: 10 CAPSULE | Refills: 0 | Status: SHIPPED | OUTPATIENT
Start: 2019-02-04 | End: 2019-02-09

## 2019-02-04 NOTE — LETTER
Please excuse Amalia Pierce  55 from work today 19 through 2/10/19  She can return on 19  Thank you,    Pratibha Tracy

## 2019-02-04 NOTE — PROGRESS NOTES
Assessment/Plan:    Suspect influenza- Will treat prophylactically, nasal swab obtained and will be sent to lab to confirm dx  Household contacts will need to be treated  Follow up with me as needed and with Dr Andreas Olvera as scheduled in March, labs prior  Diagnoses and all orders for this visit:    Influenza  -     oseltamivir (TAMIFLU) 75 mg capsule; Take 1 capsule (75 mg total) by mouth 2 (two) times a day for 5 days  -     INFLUENZA A/B AND RSV, PCR; Future  -     INFLUENZA A/B AND RSV, PCR    The patient was counseled regarding instructions for management, risk factor reductions, patient and family education,impressions, risks and benefits of treatment options, side effects of medications, importance of compliance with treatment  The treatment plan was reviewed with the patient/guardian and patient/guardian understands and agrees with the treatment plan  Current Outpatient Prescriptions:     amLODIPine-benazepril (LOTREL) 10-20 MG per capsule, Take 1 capsule by mouth daily, Disp: , Rfl:     ascorbic acid (VITAMIN C) 500 mg tablet, Take 500 mg by mouth, Disp: , Rfl:     B Complex-Biotin-FA (HM VITAMIN B100 COMPLEX) TABS, Take by mouth, Disp: , Rfl:     betamethasone valerate (VALISONE) 0 1 % cream, daily  To affected area once daily  , Disp: , Rfl:     Calcium Carb-Cholecalciferol 600-800 MG-UNIT TABS, Take 1 tablet by mouth, Disp: , Rfl:     Cholecalciferol (VITAMIN D) 2000 units CAPS, Take 2,000 Units by mouth, Disp: , Rfl:     clobetasol (TEMOVATE) 0 05 % cream, APPLY TOPICALLY TO AFFECTED AREA 2 TIMES A DAY, ARMS/LEGS FOR UP TO 2 WEEKS THEN 2-3 DAYS PER WEEK, Disp: , Rfl: 5    EPINEPHrine (EPIPEN) 0 3 mg/0 3 mL SOAJ, Inject as directed, Disp: , Rfl:     ESTRACE VAGINAL 0 1 MG/GM vaginal cream, ONE GRAM PER VAGINA TWICE WEEKLY, Disp: 42 5 g, Rfl: 0    fluticasone (FLONASE) 50 mcg/act nasal spray, into each nostril, Disp: , Rfl:     LORazepam (ATIVAN) 1 mg tablet, Take 1 tablet (1 mg total) by mouth 2 (two) times a day, Disp: 15 tablet, Rfl: 0    Multiple Vitamin (MULTI-VITAMIN DAILY) TABS, Take by mouth, Disp: , Rfl:     neomycin-colistin-hydrocortisone-thonzonium (CORTISPORIN-TC) 0 33-0 3-1-0 05 % otic suspension, Administer into ears, Disp: , Rfl:     omeprazole (PriLOSEC) 20 mg delayed release capsule, TAKE 1 CAPSULE DAILY, Disp: 30 capsule, Rfl: 5    Turmeric 500 MG CAPS, Take by mouth, Disp: , Rfl:     valACYclovir (VALTREX) 500 mg tablet, TAKE 1 TABLET DAILY  , Disp: 30 tablet, Rfl: 4    oseltamivir (TAMIFLU) 75 mg capsule, Take 1 capsule (75 mg total) by mouth 2 (two) times a day for 5 days, Disp: 10 capsule, Rfl: 0    Subjective:      Patient ID: Chelsie Frausto is a 61 y o  female  Started yesterday with climbing fever to 102, sore throat, headache, chills, productive cough fatigue, body aches  Took Mucinex, Theraflu and Extra Strength Tylenol  The following portions of the patient's history were reviewed and updated as appropriate:   She has a past medical history of Abnormal mammogram; Chest pain; Chronic maxillary sinusitis; Fatty liver; Lyme disease; and Tick bites  ,   does not have any pertinent problems on file  ,   has a past surgical history that includes Eye surgery  ,  family history includes Coronary artery disease in her father  ,   reports that she has never smoked  She has never used smokeless tobacco  She reports that she drinks alcohol  She reports that she does not use drugs  ,  is allergic to bee venom and erythromycin       Review of Systems   Constitutional: Positive for chills and fever  HENT: Positive for sore throat  Respiratory: Positive for cough  Cardiovascular: Negative  Musculoskeletal: Negative  Neurological: Positive for headaches  Psychiatric/Behavioral: Negative            Objective:  /70 (BP Location: Left arm, Patient Position: Sitting, Cuff Size: Standard)   Pulse 68   Temp 99 6 °F (37 6 °C) (Tympanic)   Resp 18   Ht 5' 4" (1 626 m)   Wt 81 4 kg (179 lb 6 4 oz)   SpO2 98%   BMI 30 79 kg/m²     Lab Review  No visits with results within 2 Month(s) from this visit  Latest known visit with results is:   Orders Only on 08/23/2018   Component Date Value    White Blood Cell Count 08/23/2018 6 0     Red Blood Cell Count 08/23/2018 4 41     Hemoglobin 08/23/2018 13 1     HCT 08/23/2018 39 9     MCV 08/23/2018 91     MCH 08/23/2018 29 7     MCHC 08/23/2018 32 8     RDW 08/23/2018 13 3     Platelet Count 31/86/3267 245     Neutrophils 08/23/2018 46     Lymphocytes 08/23/2018 41     Monocytes 08/23/2018 9     Eosinophils 08/23/2018 3     Basophils PCT 08/23/2018 1     Neutrophils (Absolute) 08/23/2018 2 8     Lymphocytes (Absolute) 08/23/2018 2 4     Monocytes (Absolute) 08/23/2018 0 5     Eosinophils (Absolute) 08/23/2018 0 2     Basophils ABS 08/23/2018 0 0     Immature Granulocytes 08/23/2018 0     Immature Granulocytes (A* 08/23/2018 0 0     Glucose, Random 08/23/2018 112*    BUN 08/23/2018 15     Creatinine 08/23/2018 0 86     eGFR Non  08/23/2018 73     eGFR  08/23/2018 84     SL AMB BUN/CREATININE RA* 08/23/2018 17     Sodium 08/23/2018 139     Potassium 08/23/2018 4 4     Chloride 08/23/2018 101     CO2 08/23/2018 23     CALCIUM 08/23/2018 9 5     Protein, Total 08/23/2018 7 1     Albumin 08/23/2018 4 3     Globulin, Total 08/23/2018 2 8     Albumin/Globulin Ratio 08/23/2018 1 5     TOTAL BILIRUBIN 08/23/2018 0 2     Alk Phos Isoenzymes 08/23/2018 56     AST 08/23/2018 17     ALT 08/23/2018 16     Cholesterol, Total 08/23/2018 236*    Triglycerides 08/23/2018 58     HDL 08/23/2018 79     VLDL Cholesterol Calcula* 08/23/2018 12     LDL Direct 08/23/2018 145*    LDL Cholesterol 08/23/2018 158*    Lyme Disease(B burgdorfe* 08/23/2018 Negative     TSH 08/23/2018 2 380         Imaging: No results found         Physical Exam   Constitutional: She is oriented to person, place, and time  She appears well-developed and well-nourished  Cardiovascular: Normal rate, regular rhythm, normal heart sounds and intact distal pulses  Pulmonary/Chest: Effort normal and breath sounds normal    Musculoskeletal: Normal range of motion  Neurological: She is alert and oriented to person, place, and time  She has normal reflexes  Psychiatric: She has a normal mood and affect   Her behavior is normal  Judgment and thought content normal

## 2019-02-04 NOTE — PATIENT INSTRUCTIONS
Suspect influenza- Will treat prophylactically, nasal swab obtained and will be sent to lab to confirm dx  Household contacts will need to be treated  Follow up with me as needed and with Dr Ambrocio Oakes as scheduled in March, labs prior

## 2019-02-11 ENCOUNTER — TELEPHONE (OUTPATIENT)
Dept: INTERNAL MEDICINE CLINIC | Facility: CLINIC | Age: 64
End: 2019-02-11

## 2019-03-29 DIAGNOSIS — I10 HYPERTENSION, UNSPECIFIED TYPE: ICD-10-CM

## 2019-04-02 RX ORDER — AMLODIPINE BESYLATE AND BENAZEPRIL HYDROCHLORIDE 10; 20 MG/1; MG/1
1 CAPSULE ORAL DAILY
Qty: 30 CAPSULE | Refills: 2 | Status: SHIPPED | OUTPATIENT
Start: 2019-04-02 | End: 2019-06-28 | Stop reason: SDUPTHER

## 2019-06-28 ENCOUNTER — OFFICE VISIT (OUTPATIENT)
Dept: INTERNAL MEDICINE CLINIC | Facility: CLINIC | Age: 64
End: 2019-06-28
Payer: COMMERCIAL

## 2019-06-28 VITALS
TEMPERATURE: 97.7 F | DIASTOLIC BLOOD PRESSURE: 72 MMHG | RESPIRATION RATE: 18 BRPM | OXYGEN SATURATION: 98 % | HEART RATE: 58 BPM | SYSTOLIC BLOOD PRESSURE: 122 MMHG | BODY MASS INDEX: 32.71 KG/M2 | HEIGHT: 64 IN | WEIGHT: 191.6 LBS

## 2019-06-28 DIAGNOSIS — R73.09 ELEVATED GLUCOSE: ICD-10-CM

## 2019-06-28 DIAGNOSIS — Z12.11 ENCOUNTER FOR SCREENING COLONOSCOPY: ICD-10-CM

## 2019-06-28 DIAGNOSIS — H92.03 OTALGIA OF BOTH EARS: ICD-10-CM

## 2019-06-28 DIAGNOSIS — K21.9 GASTROESOPHAGEAL REFLUX DISEASE WITHOUT ESOPHAGITIS: Primary | ICD-10-CM

## 2019-06-28 DIAGNOSIS — L60.8 DEFORMITY OF TOENAIL: ICD-10-CM

## 2019-06-28 DIAGNOSIS — E55.9 VITAMIN D DEFICIENCY: ICD-10-CM

## 2019-06-28 DIAGNOSIS — Z12.39 ENCOUNTER FOR OTHER SCREENING FOR MALIGNANT NEOPLASM OF BREAST: ICD-10-CM

## 2019-06-28 DIAGNOSIS — E78.5 HYPERLIPIDEMIA, UNSPECIFIED HYPERLIPIDEMIA TYPE: ICD-10-CM

## 2019-06-28 DIAGNOSIS — L43.9 LP (LICHEN PLANUS): ICD-10-CM

## 2019-06-28 DIAGNOSIS — F41.9 ANXIETY DISORDER, UNSPECIFIED TYPE: ICD-10-CM

## 2019-06-28 DIAGNOSIS — I10 HYPERTENSION, UNSPECIFIED TYPE: ICD-10-CM

## 2019-06-28 PROBLEM — J11.1 INFLUENZA: Status: RESOLVED | Noted: 2019-02-04 | Resolved: 2019-06-28

## 2019-06-28 LAB — SL AMB POCT HEMOGLOBIN AIC: 5.5 (ref ?–6.5)

## 2019-06-28 PROCEDURE — 3008F BODY MASS INDEX DOCD: CPT | Performed by: NURSE PRACTITIONER

## 2019-06-28 PROCEDURE — 3078F DIAST BP <80 MM HG: CPT | Performed by: NURSE PRACTITIONER

## 2019-06-28 PROCEDURE — 83036 HEMOGLOBIN GLYCOSYLATED A1C: CPT | Performed by: NURSE PRACTITIONER

## 2019-06-28 PROCEDURE — 1036F TOBACCO NON-USER: CPT | Performed by: NURSE PRACTITIONER

## 2019-06-28 PROCEDURE — 3074F SYST BP LT 130 MM HG: CPT | Performed by: NURSE PRACTITIONER

## 2019-06-28 PROCEDURE — 99214 OFFICE O/P EST MOD 30 MIN: CPT | Performed by: NURSE PRACTITIONER

## 2019-06-28 RX ORDER — AMLODIPINE BESYLATE AND BENAZEPRIL HYDROCHLORIDE 10; 20 MG/1; MG/1
1 CAPSULE ORAL DAILY
Qty: 90 CAPSULE | Refills: 3 | Status: SHIPPED | OUTPATIENT
Start: 2019-06-28 | End: 2020-08-07

## 2019-07-02 DIAGNOSIS — B00.9 HERPES SIMPLEX INFECTION: ICD-10-CM

## 2019-07-02 RX ORDER — VALACYCLOVIR HYDROCHLORIDE 500 MG/1
TABLET, FILM COATED ORAL
Qty: 30 TABLET | Refills: 4 | Status: SHIPPED | OUTPATIENT
Start: 2019-07-02 | End: 2020-03-03

## 2019-07-26 ENCOUNTER — APPOINTMENT (OUTPATIENT)
Dept: LAB | Facility: CLINIC | Age: 64
End: 2019-07-26
Payer: COMMERCIAL

## 2019-07-26 DIAGNOSIS — K21.9 GASTROESOPHAGEAL REFLUX DISEASE WITHOUT ESOPHAGITIS: ICD-10-CM

## 2019-07-26 DIAGNOSIS — E78.5 HYPERLIPIDEMIA, UNSPECIFIED HYPERLIPIDEMIA TYPE: Primary | ICD-10-CM

## 2019-07-26 DIAGNOSIS — Z12.39 ENCOUNTER FOR OTHER SCREENING FOR MALIGNANT NEOPLASM OF BREAST: ICD-10-CM

## 2019-07-26 DIAGNOSIS — E55.9 VITAMIN D DEFICIENCY: ICD-10-CM

## 2019-07-26 DIAGNOSIS — E55.9 AVITAMINOSIS D: ICD-10-CM

## 2019-07-26 DIAGNOSIS — Z12.39 OTHER SCREENING BREAST EXAMINATION: ICD-10-CM

## 2019-07-26 DIAGNOSIS — Z12.11 ENCOUNTER FOR SCREENING COLONOSCOPY: ICD-10-CM

## 2019-07-26 DIAGNOSIS — E87.1 HYPONATREMIA: ICD-10-CM

## 2019-07-26 DIAGNOSIS — F41.9 ANXIETY DISORDER, UNSPECIFIED TYPE: ICD-10-CM

## 2019-07-26 DIAGNOSIS — Z91.030 ALLERGY TO BEE STING: ICD-10-CM

## 2019-07-26 DIAGNOSIS — L60.8 DEFORMITY OF TOENAIL: ICD-10-CM

## 2019-07-26 DIAGNOSIS — H92.03 OTALGIA OF BOTH EARS: ICD-10-CM

## 2019-07-26 DIAGNOSIS — R73.09 ELEVATED GLUCOSE: ICD-10-CM

## 2019-07-26 DIAGNOSIS — I10 HYPERTENSION, UNSPECIFIED TYPE: ICD-10-CM

## 2019-07-26 DIAGNOSIS — R73.09 IMPAIRED GLUCOSE TOLERANCE TEST: ICD-10-CM

## 2019-07-26 DIAGNOSIS — Z12.11 SPECIAL SCREENING FOR MALIGNANT NEOPLASMS, COLON: ICD-10-CM

## 2019-07-26 DIAGNOSIS — R77.8 ELEVATED TOTAL PROTEIN: ICD-10-CM

## 2019-07-26 LAB
25(OH)D3 SERPL-MCNC: 17.3 NG/ML (ref 30–100)
ALBUMIN SERPL BCP-MCNC: 4.1 G/DL (ref 3.5–5)
ALP SERPL-CCNC: 65 U/L (ref 46–116)
ALT SERPL W P-5'-P-CCNC: 26 U/L (ref 12–78)
ANION GAP SERPL CALCULATED.3IONS-SCNC: 5 MMOL/L (ref 4–13)
AST SERPL W P-5'-P-CCNC: 18 U/L (ref 5–45)
BASOPHILS # BLD AUTO: 0.05 THOUSANDS/ΜL (ref 0–0.1)
BASOPHILS NFR BLD AUTO: 1 % (ref 0–1)
BILIRUB SERPL-MCNC: 0.61 MG/DL (ref 0.2–1)
BUN SERPL-MCNC: 16 MG/DL (ref 5–25)
CALCIUM SERPL-MCNC: 9.3 MG/DL (ref 8.3–10.1)
CHLORIDE SERPL-SCNC: 106 MMOL/L (ref 100–108)
CHOLEST SERPL-MCNC: 257 MG/DL (ref 50–200)
CO2 SERPL-SCNC: 26 MMOL/L (ref 21–32)
CREAT SERPL-MCNC: 0.84 MG/DL (ref 0.6–1.3)
CREAT UR-MCNC: 77.2 MG/DL
EOSINOPHIL # BLD AUTO: 0.15 THOUSAND/ΜL (ref 0–0.61)
EOSINOPHIL NFR BLD AUTO: 3 % (ref 0–6)
ERYTHROCYTE [DISTWIDTH] IN BLOOD BY AUTOMATED COUNT: 12.8 % (ref 11.6–15.1)
EST. AVERAGE GLUCOSE BLD GHB EST-MCNC: 114 MG/DL
GFR SERPL CREATININE-BSD FRML MDRD: 74 ML/MIN/1.73SQ M
GLUCOSE P FAST SERPL-MCNC: 111 MG/DL (ref 65–99)
HBA1C MFR BLD: 5.6 % (ref 4.2–6.3)
HCT VFR BLD AUTO: 42 % (ref 34.8–46.1)
HDLC SERPL-MCNC: 89 MG/DL (ref 40–60)
HGB BLD-MCNC: 13.7 G/DL (ref 11.5–15.4)
IMM GRANULOCYTES # BLD AUTO: 0.02 THOUSAND/UL (ref 0–0.2)
IMM GRANULOCYTES NFR BLD AUTO: 0 % (ref 0–2)
LDLC SERPL CALC-MCNC: 157 MG/DL (ref 0–100)
LYMPHOCYTES # BLD AUTO: 2.14 THOUSANDS/ΜL (ref 0.6–4.47)
LYMPHOCYTES NFR BLD AUTO: 41 % (ref 14–44)
MCH RBC QN AUTO: 30.9 PG (ref 26.8–34.3)
MCHC RBC AUTO-ENTMCNC: 32.6 G/DL (ref 31.4–37.4)
MCV RBC AUTO: 95 FL (ref 82–98)
MICROALBUMIN UR-MCNC: <5 MG/L (ref 0–20)
MICROALBUMIN/CREAT 24H UR: <6 MG/G CREATININE (ref 0–30)
MONOCYTES # BLD AUTO: 0.47 THOUSAND/ΜL (ref 0.17–1.22)
MONOCYTES NFR BLD AUTO: 9 % (ref 4–12)
NEUTROPHILS # BLD AUTO: 2.44 THOUSANDS/ΜL (ref 1.85–7.62)
NEUTS SEG NFR BLD AUTO: 46 % (ref 43–75)
NRBC BLD AUTO-RTO: 0 /100 WBCS
PLATELET # BLD AUTO: 215 THOUSANDS/UL (ref 149–390)
PMV BLD AUTO: 12.1 FL (ref 8.9–12.7)
POTASSIUM SERPL-SCNC: 4.4 MMOL/L (ref 3.5–5.3)
PROT SERPL-MCNC: 7.8 G/DL (ref 6.4–8.2)
RBC # BLD AUTO: 4.43 MILLION/UL (ref 3.81–5.12)
SODIUM SERPL-SCNC: 137 MMOL/L (ref 136–145)
TRIGL SERPL-MCNC: 55 MG/DL
TSH SERPL DL<=0.05 MIU/L-ACNC: 2.25 UIU/ML (ref 0.36–3.74)
WBC # BLD AUTO: 5.27 THOUSAND/UL (ref 4.31–10.16)

## 2019-07-26 PROCEDURE — 84443 ASSAY THYROID STIM HORMONE: CPT

## 2019-07-26 PROCEDURE — 82043 UR ALBUMIN QUANTITATIVE: CPT

## 2019-07-26 PROCEDURE — 85025 COMPLETE CBC W/AUTO DIFF WBC: CPT

## 2019-07-26 PROCEDURE — 83036 HEMOGLOBIN GLYCOSYLATED A1C: CPT

## 2019-07-26 PROCEDURE — 36415 COLL VENOUS BLD VENIPUNCTURE: CPT

## 2019-07-26 PROCEDURE — 80053 COMPREHEN METABOLIC PANEL: CPT

## 2019-07-26 PROCEDURE — 82306 VITAMIN D 25 HYDROXY: CPT

## 2019-07-26 PROCEDURE — 86618 LYME DISEASE ANTIBODY: CPT

## 2019-07-26 PROCEDURE — 82570 ASSAY OF URINE CREATININE: CPT

## 2019-07-26 PROCEDURE — 80061 LIPID PANEL: CPT

## 2019-07-28 LAB
B BURGDOR IGG SER IA-ACNC: 0.38
B BURGDOR IGM SER IA-ACNC: 0.41

## 2019-07-29 ENCOUNTER — ANNUAL EXAM (OUTPATIENT)
Dept: INTERNAL MEDICINE CLINIC | Facility: CLINIC | Age: 64
End: 2019-07-29
Payer: COMMERCIAL

## 2019-07-29 VITALS
TEMPERATURE: 97.3 F | OXYGEN SATURATION: 99 % | HEIGHT: 64 IN | DIASTOLIC BLOOD PRESSURE: 62 MMHG | BODY MASS INDEX: 32.95 KG/M2 | HEART RATE: 71 BPM | WEIGHT: 193 LBS | SYSTOLIC BLOOD PRESSURE: 120 MMHG

## 2019-07-29 DIAGNOSIS — E78.5 HYPERLIPIDEMIA, UNSPECIFIED HYPERLIPIDEMIA TYPE: ICD-10-CM

## 2019-07-29 DIAGNOSIS — E55.9 VITAMIN D DEFICIENCY: Primary | ICD-10-CM

## 2019-07-29 DIAGNOSIS — L43.9 LP (LICHEN PLANUS): ICD-10-CM

## 2019-07-29 DIAGNOSIS — I10 HYPERTENSION, UNSPECIFIED TYPE: ICD-10-CM

## 2019-07-29 DIAGNOSIS — R73.09 ELEVATED GLUCOSE: ICD-10-CM

## 2019-07-29 DIAGNOSIS — Z01.419 ENCOUNTER FOR GYNECOLOGICAL EXAMINATION WITH PAPANICOLAOU SMEAR OF CERVIX: ICD-10-CM

## 2019-07-29 PROCEDURE — 87624 HPV HI-RISK TYP POOLED RSLT: CPT | Performed by: NURSE PRACTITIONER

## 2019-07-29 PROCEDURE — G0145 SCR C/V CYTO,THINLAYER,RESCR: HCPCS | Performed by: NURSE PRACTITIONER

## 2019-07-29 PROCEDURE — S0612 ANNUAL GYNECOLOGICAL EXAMINA: HCPCS | Performed by: NURSE PRACTITIONER

## 2019-07-29 PROCEDURE — 99214 OFFICE O/P EST MOD 30 MIN: CPT | Performed by: NURSE PRACTITIONER

## 2019-07-29 NOTE — PROGRESS NOTES
Assessment/Plan:    1  Pap/pelvic with co-testing completed today  Will have mammogram and Cologuard completed this summer and we will call you with results  2  Hyperlipidemia- HDL elevated, LDL higher than last year (was 145, now 157), (+) family hx heart disease  Discussed calcium score testing, not interested at this time  Will be watching diet and limiting fatty foods and will recheck in six months  3  Elevated fasting glucose- A1c 5 6  Please limit white rice/potatoes/pasta/bread as we discussed, as well as sweets and will recheck in six months  4  Lichen planus- Referred to Dermatology  5  Hypertension at goal  6  Vitamin D deficiency- Vit D 17, will start weekly Vitamin D 50 000 units and recheck in one year  Follow up with me as needed and with Dr Avery Crawford in six months, labs prior  Diagnoses and all orders for this visit:    Vitamin D deficiency  -     Cholecalciferol 55726 units capsule; Take 1 capsule (50,000 Units total) by mouth daily    Hypertension, unspecified type    Elevated glucose  -     HEMOGLOBIN A1C W/ EAG ESTIMATION; Future    Hyperlipidemia, unspecified hyperlipidemia type  -     Lipid panel; Future    Encounter for gynecological examination with Papanicolaou smear of cervix  -     Liquid-based pap, screening    LP (lichen planus)  -     Ambulatory referral to Dermatology; Future    The patient was counseled regarding instructions for management, risk factor reductions, patient and family education,impressions, risks and benefits of treatment options, side effects of medications, importance of compliance with treatment  The treatment plan was reviewed with the patient/guardian and patient/guardian understands and agrees with the treatment plan          Current Outpatient Medications:     amLODIPine-benazepril (LOTREL) 10-20 MG per capsule, Take 1 capsule by mouth daily, Disp: 90 capsule, Rfl: 3    ascorbic acid (VITAMIN C) 500 mg tablet, Take 500 mg by mouth, Disp: , Rfl:     B Complex-Biotin-FA (HM VITAMIN B100 COMPLEX) TABS, Take by mouth, Disp: , Rfl:     betamethasone valerate (VALISONE) 0 1 % cream, daily  To affected area once daily  , Disp: , Rfl:     Calcium Carb-Cholecalciferol 600-800 MG-UNIT TABS, Take 1 tablet by mouth, Disp: , Rfl:     clobetasol (TEMOVATE) 0 05 % cream, APPLY TOPICALLY TO AFFECTED AREA 2 TIMES A DAY, ARMS/LEGS FOR UP TO 2 WEEKS THEN 2-3 DAYS PER WEEK, Disp: , Rfl: 5    EPINEPHrine (EPIPEN) 0 3 mg/0 3 mL SOAJ, Inject as directed, Disp: , Rfl:     ESTRACE VAGINAL 0 1 MG/GM vaginal cream, ONE GRAM PER VAGINA TWICE WEEKLY, Disp: 42 5 g, Rfl: 0    fluticasone (FLONASE) 50 mcg/act nasal spray, into each nostril, Disp: , Rfl:     LORazepam (ATIVAN) 1 mg tablet, Take 1 tablet (1 mg total) by mouth 2 (two) times a day, Disp: 15 tablet, Rfl: 0    Multiple Vitamin (MULTI-VITAMIN DAILY) TABS, Take by mouth, Disp: , Rfl:     neomycin-colistin-hydrocortisone-thonzonium (CORTISPORIN-TC) 0 33-0 3-1-0 05 % otic suspension, Administer into ears, Disp: , Rfl:     omeprazole (PriLOSEC) 20 mg delayed release capsule, TAKE 1 CAPSULE DAILY, Disp: 30 capsule, Rfl: 5    Turmeric 500 MG CAPS, Take by mouth, Disp: , Rfl:     valACYclovir (VALTREX) 500 mg tablet, TAKE 1 TABLET DAILY  , Disp: 30 tablet, Rfl: 4    Cholecalciferol 04918 units capsule, Take 1 capsule (50,000 Units total) by mouth daily, Disp: 12 capsule, Rfl: 3    Subjective:      Patient ID: Trena Barrera is a 61 y o  female  Here for pap/pelvic and lab review  No complaints today  Would like a second opinion on management of lichen planus  The following portions of the patient's history were reviewed and updated as appropriate:   She has a past medical history of Abnormal mammogram, Chest pain, Chronic maxillary sinusitis, Fatty liver, Lyme disease, and Tick bites  ,  does not have any pertinent problems on file  ,   has a past surgical history that includes Eye surgery  ,  family history includes Coronary artery disease in her father  ,   reports that she has never smoked  She has never used smokeless tobacco  She reports that she drinks alcohol  She reports that she does not use drugs  ,  is allergic to bee venom and erythromycin       Review of Systems   Constitutional: Negative  Respiratory: Negative  Cardiovascular: Negative  Genitourinary: Negative  Musculoskeletal: Negative  Psychiatric/Behavioral: Negative            Objective:  /62 (BP Location: Left arm, Patient Position: Sitting, Cuff Size: Standard)   Pulse 71   Temp (!) 97 3 °F (36 3 °C) (Tympanic)   Ht 5' 4" (1 626 m)   Wt 87 5 kg (193 lb)   SpO2 99%   BMI 33 13 kg/m²     Lab Review  Appointment on 07/26/2019   Component Date Value    WBC 07/26/2019 5 27     RBC 07/26/2019 4 43     Hemoglobin 07/26/2019 13 7     Hematocrit 07/26/2019 42 0     MCV 07/26/2019 95     MCH 07/26/2019 30 9     MCHC 07/26/2019 32 6     RDW 07/26/2019 12 8     MPV 07/26/2019 12 1     Platelets 42/86/7533 215     nRBC 07/26/2019 0     Neutrophils Relative 07/26/2019 46     Immat GRANS % 07/26/2019 0     Lymphocytes Relative 07/26/2019 41     Monocytes Relative 07/26/2019 9     Eosinophils Relative 07/26/2019 3     Basophils Relative 07/26/2019 1     Neutrophils Absolute 07/26/2019 2 44     Immature Grans Absolute 07/26/2019 0 02     Lymphocytes Absolute 07/26/2019 2 14     Monocytes Absolute 07/26/2019 0 47     Eosinophils Absolute 07/26/2019 0 15     Basophils Absolute 07/26/2019 0 05     Sodium 07/26/2019 137     Potassium 07/26/2019 4 4     Chloride 07/26/2019 106     CO2 07/26/2019 26     ANION GAP 07/26/2019 5     BUN 07/26/2019 16     Creatinine 07/26/2019 0 84     Glucose, Fasting 07/26/2019 111*    Calcium 07/26/2019 9 3     AST 07/26/2019 18     ALT 07/26/2019 26     Alkaline Phosphatase 07/26/2019 65     Total Protein 07/26/2019 7 8     Albumin 07/26/2019 4 1     Total Bilirubin 07/26/2019 0 61     eGFR 07/26/2019 74     Hemoglobin A1C 07/26/2019 5 6     EAG 07/26/2019 114     TSH 3RD GENERATON 07/26/2019 2 250     Creatinine, Ur 07/26/2019 77 2     Microalbum  ,U,Random 07/26/2019 <5 0     Microalb Creat Ratio 07/26/2019 <6     Cholesterol 07/26/2019 257*    Triglycerides 07/26/2019 55     HDL, Direct 07/26/2019 89*    LDL Calculated 07/26/2019 157*    Vit D, 25-Hydroxy 07/26/2019 17 3*    LYME AB IGG 07/26/2019 0 38     LYME AB IGM 07/26/2019 0 41    Office Visit on 06/28/2019   Component Date Value    Hemoglobin A1C 06/28/2019 5 5         Imaging: No results found  Physical Exam   Pulmonary/Chest: Right breast exhibits no inverted nipple, no mass, no nipple discharge, no skin change and no tenderness  Left breast exhibits no inverted nipple, no mass, no nipple discharge, no skin change and no tenderness  No breast tenderness or discharge  Breasts are symmetrical    Genitourinary: Uterus normal  No breast tenderness or discharge  There is no rash, tenderness or lesion on the right labia  There is no rash, tenderness or lesion on the left labia  Cervix exhibits no discharge and no friability  Right adnexum displays no mass and no tenderness  Left adnexum displays no mass and no tenderness  No erythema, tenderness or bleeding in the vagina  No vaginal discharge found  Lymphadenopathy:        Right axillary: No pectoral and no lateral adenopathy present  Left axillary: No pectoral and no lateral adenopathy present

## 2019-07-29 NOTE — PATIENT INSTRUCTIONS
1  Pap/pelvic with co-testing completed today  Will have mammogram and Cologuard completed this summer and we will call you with results  2  Hyperlipidemia- HDL elevated, LDL higher than last year (was 145, now 157), (+) family hx heart disease  Discussed calcium score testing, not interested at this time  Will be watching diet and limiting fatty foods and will recheck in six months  3  Elevated fasting glucose- A1c 5 6  Please limit white rice/potatoes/pasta/bread as we discussed, as well as sweets and will recheck in six months  4  Lichen planus- Referred to Dermatology  5  Hypertension at goal  6  Vitamin D deficiency- Vit D 17, will start weekly Vitamin D 50 000 units and recheck in one year  Follow up with me as needed and with Dr Ruiz Layton in six months, labs prior

## 2019-07-30 LAB
HPV HR 12 DNA CVX QL NAA+PROBE: NEGATIVE
HPV16 DNA CVX QL NAA+PROBE: NEGATIVE
HPV18 DNA CVX QL NAA+PROBE: NEGATIVE

## 2019-07-31 LAB
LAB AP GYN PRIMARY INTERPRETATION: NORMAL
Lab: NORMAL

## 2019-08-02 ENCOUNTER — TELEPHONE (OUTPATIENT)
Dept: INTERNAL MEDICINE CLINIC | Facility: CLINIC | Age: 64
End: 2019-08-02

## 2019-08-02 NOTE — TELEPHONE ENCOUNTER
YVETTE      ----- Message from [facility], [address] sent at 8/2/2019  3:54 PM EDT -----  Please call  Pap and HPV negative   thanks

## 2019-08-12 ENCOUNTER — TELEPHONE (OUTPATIENT)
Dept: INTERNAL MEDICINE CLINIC | Facility: CLINIC | Age: 64
End: 2019-08-12

## 2019-08-16 DIAGNOSIS — E55.9 VITAMIN D DEFICIENCY: ICD-10-CM

## 2019-09-11 DIAGNOSIS — K21.9 GASTROESOPHAGEAL REFLUX DISEASE, ESOPHAGITIS PRESENCE NOT SPECIFIED: ICD-10-CM

## 2019-09-11 RX ORDER — OMEPRAZOLE 20 MG/1
CAPSULE, DELAYED RELEASE ORAL
Qty: 30 CAPSULE | Refills: 5 | Status: SHIPPED | OUTPATIENT
Start: 2019-09-11 | End: 2020-02-04

## 2019-12-07 DIAGNOSIS — L20.9 ATOPIC DERMATITIS, UNSPECIFIED TYPE: ICD-10-CM

## 2019-12-20 ENCOUNTER — TELEPHONE (OUTPATIENT)
Dept: INTERNAL MEDICINE CLINIC | Facility: CLINIC | Age: 64
End: 2019-12-20

## 2020-01-31 DIAGNOSIS — K21.9 GASTROESOPHAGEAL REFLUX DISEASE, ESOPHAGITIS PRESENCE NOT SPECIFIED: ICD-10-CM

## 2020-02-04 RX ORDER — OMEPRAZOLE 20 MG/1
CAPSULE, DELAYED RELEASE ORAL
Qty: 90 CAPSULE | Refills: 0 | Status: SHIPPED | OUTPATIENT
Start: 2020-02-04 | End: 2020-05-11

## 2020-02-29 DIAGNOSIS — B00.9 HERPES SIMPLEX INFECTION: ICD-10-CM

## 2020-03-03 RX ORDER — VALACYCLOVIR HYDROCHLORIDE 500 MG/1
TABLET, FILM COATED ORAL
Qty: 30 TABLET | Refills: 4 | Status: SHIPPED | OUTPATIENT
Start: 2020-03-03 | End: 2020-08-17 | Stop reason: SDUPTHER

## 2020-04-25 DIAGNOSIS — L20.9 ATOPIC DERMATITIS, UNSPECIFIED TYPE: ICD-10-CM

## 2020-05-09 DIAGNOSIS — K21.9 GASTROESOPHAGEAL REFLUX DISEASE, ESOPHAGITIS PRESENCE NOT SPECIFIED: ICD-10-CM

## 2020-05-11 RX ORDER — OMEPRAZOLE 20 MG/1
CAPSULE, DELAYED RELEASE ORAL
Qty: 90 CAPSULE | Refills: 0 | Status: SHIPPED | OUTPATIENT
Start: 2020-05-11 | End: 2020-08-13

## 2020-08-05 DIAGNOSIS — Z12.11 ENCOUNTER FOR SCREENING COLONOSCOPY: ICD-10-CM

## 2020-08-05 DIAGNOSIS — R73.09 ELEVATED GLUCOSE: ICD-10-CM

## 2020-08-05 DIAGNOSIS — Z12.39 ENCOUNTER FOR OTHER SCREENING FOR MALIGNANT NEOPLASM OF BREAST: ICD-10-CM

## 2020-08-05 DIAGNOSIS — K21.9 GASTROESOPHAGEAL REFLUX DISEASE WITHOUT ESOPHAGITIS: ICD-10-CM

## 2020-08-05 DIAGNOSIS — E78.5 HYPERLIPIDEMIA, UNSPECIFIED HYPERLIPIDEMIA TYPE: ICD-10-CM

## 2020-08-05 DIAGNOSIS — E55.9 VITAMIN D DEFICIENCY: ICD-10-CM

## 2020-08-07 DIAGNOSIS — K21.9 GASTROESOPHAGEAL REFLUX DISEASE, ESOPHAGITIS PRESENCE NOT SPECIFIED: ICD-10-CM

## 2020-08-07 RX ORDER — AMLODIPINE BESYLATE AND BENAZEPRIL HYDROCHLORIDE 10; 20 MG/1; MG/1
CAPSULE ORAL
Qty: 90 CAPSULE | Refills: 3 | Status: SHIPPED | OUTPATIENT
Start: 2020-08-07 | End: 2020-08-17 | Stop reason: SDUPTHER

## 2020-08-13 RX ORDER — OMEPRAZOLE 20 MG/1
CAPSULE, DELAYED RELEASE ORAL
Qty: 90 CAPSULE | Refills: 0 | Status: SHIPPED | OUTPATIENT
Start: 2020-08-13 | End: 2020-08-17 | Stop reason: SDUPTHER

## 2020-08-17 ENCOUNTER — OFFICE VISIT (OUTPATIENT)
Dept: INTERNAL MEDICINE CLINIC | Facility: CLINIC | Age: 65
End: 2020-08-17
Payer: COMMERCIAL

## 2020-08-17 VITALS
TEMPERATURE: 97.8 F | OXYGEN SATURATION: 98 % | BODY MASS INDEX: 33.07 KG/M2 | SYSTOLIC BLOOD PRESSURE: 130 MMHG | WEIGHT: 193.7 LBS | HEIGHT: 64 IN | DIASTOLIC BLOOD PRESSURE: 68 MMHG | RESPIRATION RATE: 16 BRPM | HEART RATE: 60 BPM

## 2020-08-17 DIAGNOSIS — R73.09 ELEVATED GLUCOSE: ICD-10-CM

## 2020-08-17 DIAGNOSIS — L20.9 ATOPIC DERMATITIS, UNSPECIFIED TYPE: ICD-10-CM

## 2020-08-17 DIAGNOSIS — L43.9 LP (LICHEN PLANUS): ICD-10-CM

## 2020-08-17 DIAGNOSIS — E78.5 HYPERLIPIDEMIA, UNSPECIFIED HYPERLIPIDEMIA TYPE: ICD-10-CM

## 2020-08-17 DIAGNOSIS — E55.9 VITAMIN D DEFICIENCY: ICD-10-CM

## 2020-08-17 DIAGNOSIS — I10 HYPERTENSION, UNSPECIFIED TYPE: ICD-10-CM

## 2020-08-17 DIAGNOSIS — K21.9 GASTROESOPHAGEAL REFLUX DISEASE, ESOPHAGITIS PRESENCE NOT SPECIFIED: ICD-10-CM

## 2020-08-17 DIAGNOSIS — Z12.39 ENCOUNTER FOR OTHER SCREENING FOR MALIGNANT NEOPLASM OF BREAST: ICD-10-CM

## 2020-08-17 DIAGNOSIS — Z12.11 ENCOUNTER FOR SCREENING COLONOSCOPY: ICD-10-CM

## 2020-08-17 DIAGNOSIS — B00.9 HERPES SIMPLEX INFECTION: ICD-10-CM

## 2020-08-17 DIAGNOSIS — K21.9 GASTROESOPHAGEAL REFLUX DISEASE WITHOUT ESOPHAGITIS: Primary | ICD-10-CM

## 2020-08-17 LAB — SL AMB POCT HEMOGLOBIN AIC: 5.5 (ref ?–6.5)

## 2020-08-17 PROCEDURE — 99396 PREV VISIT EST AGE 40-64: CPT | Performed by: NURSE PRACTITIONER

## 2020-08-17 PROCEDURE — 3008F BODY MASS INDEX DOCD: CPT | Performed by: NURSE PRACTITIONER

## 2020-08-17 PROCEDURE — 3075F SYST BP GE 130 - 139MM HG: CPT | Performed by: NURSE PRACTITIONER

## 2020-08-17 PROCEDURE — 1036F TOBACCO NON-USER: CPT | Performed by: NURSE PRACTITIONER

## 2020-08-17 PROCEDURE — 3078F DIAST BP <80 MM HG: CPT | Performed by: NURSE PRACTITIONER

## 2020-08-17 PROCEDURE — 83036 HEMOGLOBIN GLYCOSYLATED A1C: CPT | Performed by: NURSE PRACTITIONER

## 2020-08-17 RX ORDER — AMLODIPINE BESYLATE AND BENAZEPRIL HYDROCHLORIDE 10; 20 MG/1; MG/1
1 CAPSULE ORAL DAILY
Qty: 90 CAPSULE | Refills: 3 | Status: SHIPPED | OUTPATIENT
Start: 2020-08-17 | End: 2021-07-21 | Stop reason: SDUPTHER

## 2020-08-17 RX ORDER — OMEPRAZOLE 20 MG/1
20 CAPSULE, DELAYED RELEASE ORAL DAILY
Qty: 90 CAPSULE | Refills: 3 | Status: SHIPPED | OUTPATIENT
Start: 2020-08-17 | End: 2021-08-16

## 2020-08-17 RX ORDER — VALACYCLOVIR HYDROCHLORIDE 500 MG/1
500 TABLET, FILM COATED ORAL DAILY
Qty: 90 TABLET | Refills: 3 | Status: SHIPPED | OUTPATIENT
Start: 2020-08-17 | End: 2021-09-03

## 2020-08-17 NOTE — PROGRESS NOTES
BMI Counseling: Body mass index is 33 25 kg/m²  The BMI is above normal  Nutrition recommendations include decreasing portion sizes, encouraging healthy choices of fruits and vegetables, decreasing fast food intake, consuming healthier snacks, limiting drinks that contain sugar, moderation in carbohydrate intake, increasing intake of lean protein, reducing intake of saturated and trans fat and reducing intake of cholesterol  Exercise recommendations include exercising 3-5 times per week  No pharmacotherapy was ordered  Patient referred to PCP due to patient being overweight  Assessment/Plan:    1  Hypertension at goal   2  Hyperlipidemia- Due for lipid panel  3  Elevated glucose- A1c in office today is  5 5   4  GERD- Stable on Omeprazole  5  Lichen Planus- Referred to dermatology  6  Herpes simplex infection- Stable on Valacyclovir  Up to date with mammogram, colonoscopy, pap  Please have your labs drawn soon and we will call you with results  Diagnoses and all orders for this visit:    Gastroesophageal reflux disease without esophagitis  -     CBC and differential; Future  -     Comprehensive metabolic panel; Future  -     Lipid panel; Future  -     TSH, 3rd generation with Free T4 reflex; Future  -     Microalbumin / creatinine urine ratio  -     Vitamin D 25 hydroxy; Future  -     amLODIPine-benazepril (LOTREL) 10-20 MG per capsule; Take 1 capsule by mouth daily  -     betamethasone valerate (VALISONE) 0 1 % cream; Apply topically daily  -     omeprazole (PriLOSEC) 20 mg delayed release capsule; Take 1 capsule (20 mg total) by mouth daily  -     Ambulatory referral to Dermatology; Future  -     POCT hemoglobin A1c  -     Mammo screening bilateral w 3d & cad; Future  -     valACYclovir (VALTREX) 500 mg tablet; Take 1 tablet (500 mg total) by mouth daily    Hypertension, unspecified type  -     CBC and differential; Future  -     Comprehensive metabolic panel;  Future  -     Lipid panel; Future  -     TSH, 3rd generation with Free T4 reflex; Future  -     Microalbumin / creatinine urine ratio  -     Vitamin D 25 hydroxy; Future  -     amLODIPine-benazepril (LOTREL) 10-20 MG per capsule; Take 1 capsule by mouth daily  -     betamethasone valerate (VALISONE) 0 1 % cream; Apply topically daily  -     omeprazole (PriLOSEC) 20 mg delayed release capsule; Take 1 capsule (20 mg total) by mouth daily  -     Ambulatory referral to Dermatology; Future  -     POCT hemoglobin A1c  -     Mammo screening bilateral w 3d & cad; Future  -     valACYclovir (VALTREX) 500 mg tablet; Take 1 tablet (500 mg total) by mouth daily    Vitamin D deficiency  -     CBC and differential; Future  -     Comprehensive metabolic panel; Future  -     Lipid panel; Future  -     TSH, 3rd generation with Free T4 reflex; Future  -     Microalbumin / creatinine urine ratio  -     Vitamin D 25 hydroxy; Future  -     amLODIPine-benazepril (LOTREL) 10-20 MG per capsule; Take 1 capsule by mouth daily  -     betamethasone valerate (VALISONE) 0 1 % cream; Apply topically daily  -     omeprazole (PriLOSEC) 20 mg delayed release capsule; Take 1 capsule (20 mg total) by mouth daily  -     Ambulatory referral to Dermatology; Future  -     POCT hemoglobin A1c  -     Mammo screening bilateral w 3d & cad; Future  -     valACYclovir (VALTREX) 500 mg tablet; Take 1 tablet (500 mg total) by mouth daily    Hyperlipidemia, unspecified hyperlipidemia type  -     CBC and differential; Future  -     Comprehensive metabolic panel; Future  -     Lipid panel; Future  -     TSH, 3rd generation with Free T4 reflex; Future  -     Microalbumin / creatinine urine ratio  -     Vitamin D 25 hydroxy; Future  -     amLODIPine-benazepril (LOTREL) 10-20 MG per capsule; Take 1 capsule by mouth daily  -     betamethasone valerate (VALISONE) 0 1 % cream; Apply topically daily  -     omeprazole (PriLOSEC) 20 mg delayed release capsule;  Take 1 capsule (20 mg total) by mouth daily  -     Ambulatory referral to Dermatology; Future  -     POCT hemoglobin A1c  -     Mammo screening bilateral w 3d & cad; Future  -     valACYclovir (VALTREX) 500 mg tablet; Take 1 tablet (500 mg total) by mouth daily    Elevated glucose  -     CBC and differential; Future  -     Comprehensive metabolic panel; Future  -     Lipid panel; Future  -     TSH, 3rd generation with Free T4 reflex; Future  -     Microalbumin / creatinine urine ratio  -     Vitamin D 25 hydroxy; Future  -     amLODIPine-benazepril (LOTREL) 10-20 MG per capsule; Take 1 capsule by mouth daily  -     betamethasone valerate (VALISONE) 0 1 % cream; Apply topically daily  -     omeprazole (PriLOSEC) 20 mg delayed release capsule; Take 1 capsule (20 mg total) by mouth daily  -     Ambulatory referral to Dermatology; Future  -     POCT hemoglobin A1c  -     Mammo screening bilateral w 3d & cad; Future  -     valACYclovir (VALTREX) 500 mg tablet; Take 1 tablet (500 mg total) by mouth daily    Encounter for other screening for malignant neoplasm of breast  -     CBC and differential; Future  -     Comprehensive metabolic panel; Future  -     Lipid panel; Future  -     TSH, 3rd generation with Free T4 reflex; Future  -     Microalbumin / creatinine urine ratio  -     Vitamin D 25 hydroxy; Future  -     amLODIPine-benazepril (LOTREL) 10-20 MG per capsule; Take 1 capsule by mouth daily  -     betamethasone valerate (VALISONE) 0 1 % cream; Apply topically daily  -     omeprazole (PriLOSEC) 20 mg delayed release capsule; Take 1 capsule (20 mg total) by mouth daily  -     Ambulatory referral to Dermatology; Future  -     POCT hemoglobin A1c  -     Mammo screening bilateral w 3d & cad; Future  -     valACYclovir (VALTREX) 500 mg tablet; Take 1 tablet (500 mg total) by mouth daily    Encounter for screening colonoscopy  -     CBC and differential; Future  -     Comprehensive metabolic panel; Future  -     Lipid panel;  Future  -     TSH, 3rd generation with Free T4 reflex; Future  -     Microalbumin / creatinine urine ratio  -     Vitamin D 25 hydroxy; Future  -     amLODIPine-benazepril (LOTREL) 10-20 MG per capsule; Take 1 capsule by mouth daily  -     betamethasone valerate (VALISONE) 0 1 % cream; Apply topically daily  -     omeprazole (PriLOSEC) 20 mg delayed release capsule; Take 1 capsule (20 mg total) by mouth daily  -     Ambulatory referral to Dermatology; Future  -     POCT hemoglobin A1c  -     Mammo screening bilateral w 3d & cad; Future  -     valACYclovir (VALTREX) 500 mg tablet; Take 1 tablet (500 mg total) by mouth daily    Gastroesophageal reflux disease, esophagitis presence not specified  -     CBC and differential; Future  -     Comprehensive metabolic panel; Future  -     Lipid panel; Future  -     TSH, 3rd generation with Free T4 reflex; Future  -     Microalbumin / creatinine urine ratio  -     Vitamin D 25 hydroxy; Future  -     amLODIPine-benazepril (LOTREL) 10-20 MG per capsule; Take 1 capsule by mouth daily  -     betamethasone valerate (VALISONE) 0 1 % cream; Apply topically daily  -     omeprazole (PriLOSEC) 20 mg delayed release capsule; Take 1 capsule (20 mg total) by mouth daily  -     Ambulatory referral to Dermatology; Future  -     POCT hemoglobin A1c  -     Mammo screening bilateral w 3d & cad; Future  -     valACYclovir (VALTREX) 500 mg tablet; Take 1 tablet (500 mg total) by mouth daily    Atopic dermatitis, unspecified type  -     CBC and differential; Future  -     Comprehensive metabolic panel; Future  -     Lipid panel; Future  -     TSH, 3rd generation with Free T4 reflex; Future  -     Microalbumin / creatinine urine ratio  -     Vitamin D 25 hydroxy; Future  -     amLODIPine-benazepril (LOTREL) 10-20 MG per capsule; Take 1 capsule by mouth daily  -     betamethasone valerate (VALISONE) 0 1 % cream; Apply topically daily  -     omeprazole (PriLOSEC) 20 mg delayed release capsule;  Take 1 capsule (20 mg total) by mouth daily  -     Ambulatory referral to Dermatology; Future  -     POCT hemoglobin A1c  -     Mammo screening bilateral w 3d & cad; Future  -     valACYclovir (VALTREX) 500 mg tablet; Take 1 tablet (500 mg total) by mouth daily    LP (lichen planus)  -     CBC and differential; Future  -     Comprehensive metabolic panel; Future  -     Lipid panel; Future  -     TSH, 3rd generation with Free T4 reflex; Future  -     Microalbumin / creatinine urine ratio  -     Vitamin D 25 hydroxy; Future  -     amLODIPine-benazepril (LOTREL) 10-20 MG per capsule; Take 1 capsule by mouth daily  -     betamethasone valerate (VALISONE) 0 1 % cream; Apply topically daily  -     omeprazole (PriLOSEC) 20 mg delayed release capsule; Take 1 capsule (20 mg total) by mouth daily  -     Ambulatory referral to Dermatology; Future  -     POCT hemoglobin A1c  -     Mammo screening bilateral w 3d & cad; Future  -     valACYclovir (VALTREX) 500 mg tablet; Take 1 tablet (500 mg total) by mouth daily    Herpes simplex infection  -     CBC and differential; Future  -     Comprehensive metabolic panel; Future  -     Lipid panel; Future  -     TSH, 3rd generation with Free T4 reflex; Future  -     Microalbumin / creatinine urine ratio  -     Vitamin D 25 hydroxy; Future  -     amLODIPine-benazepril (LOTREL) 10-20 MG per capsule; Take 1 capsule by mouth daily  -     betamethasone valerate (VALISONE) 0 1 % cream; Apply topically daily  -     omeprazole (PriLOSEC) 20 mg delayed release capsule; Take 1 capsule (20 mg total) by mouth daily  -     Ambulatory referral to Dermatology; Future  -     POCT hemoglobin A1c  -     Mammo screening bilateral w 3d & cad; Future  -     valACYclovir (VALTREX) 500 mg tablet;  Take 1 tablet (500 mg total) by mouth daily    The patient was counseled regarding instructions for management, risk factor reductions, patient and family education,impressions, risks and benefits of treatment options, side effects of medications, importance of compliance with treatment  The treatment plan was reviewed with the patient/guardian and patient/guardian understands and agrees with the treatment plan          Current Outpatient Medications:     amLODIPine-benazepril (LOTREL) 10-20 MG per capsule, Take 1 capsule by mouth daily, Disp: 90 capsule, Rfl: 3    ascorbic acid (VITAMIN C) 500 mg tablet, Take 500 mg by mouth, Disp: , Rfl:     B Complex-Biotin-FA (HM VITAMIN B100 COMPLEX) TABS, Take by mouth, Disp: , Rfl:     betamethasone valerate (VALISONE) 0 1 % cream, APPLY TO AFFECTED AREA ONCE DAILY, Disp: 45 g, Rfl: 0    betamethasone valerate (VALISONE) 0 1 % cream, Apply topically daily, Disp: 30 g, Rfl: 1    Calcium Carb-Cholecalciferol 600-800 MG-UNIT TABS, Take 1 tablet by mouth, Disp: , Rfl:     Cholecalciferol 42023 units capsule, Take 1 capsule (50,000 Units total) by mouth once a week, Disp: 12 capsule, Rfl: 3    clobetasol (TEMOVATE) 0 05 % cream, APPLY TOPICALLY TO AFFECTED AREA 2 TIMES A DAY, ARMS/LEGS FOR UP TO 2 WEEKS THEN 2-3 DAYS PER WEEK, Disp: , Rfl: 5    EPINEPHrine (EPIPEN) 0 3 mg/0 3 mL SOAJ, Inject as directed, Disp: , Rfl:     ESTRACE VAGINAL 0 1 MG/GM vaginal cream, ONE GRAM PER VAGINA TWICE WEEKLY, Disp: 42 5 g, Rfl: 0    fluticasone (FLONASE) 50 mcg/act nasal spray, into each nostril, Disp: , Rfl:     LORazepam (ATIVAN) 1 mg tablet, Take 1 tablet (1 mg total) by mouth 2 (two) times a day, Disp: 15 tablet, Rfl: 0    Multiple Vitamin (MULTI-VITAMIN DAILY) TABS, Take by mouth, Disp: , Rfl:     neomycin-colistin-hydrocortisone-thonzonium (CORTISPORIN-TC) 0 33-0 3-1-0 05 % otic suspension, Administer into ears, Disp: , Rfl:     omeprazole (PriLOSEC) 20 mg delayed release capsule, Take 1 capsule (20 mg total) by mouth daily, Disp: 90 capsule, Rfl: 3    Turmeric 500 MG CAPS, Take by mouth, Disp: , Rfl:     valACYclovir (VALTREX) 500 mg tablet, Take 1 tablet (500 mg total) by mouth daily, Disp: 90 tablet, Rfl: 3    Subjective:      Patient ID: Sunshine Pruitt is a 59 y o  female  Here for follow up  No complaints today  The following portions of the patient's history were reviewed and updated as appropriate:   She has a past medical history of Abnormal mammogram, Chest pain, Chronic maxillary sinusitis, Fatty liver, Lichen planus, Lyme disease, Raynaud's disease, and Tick bites  ,  does not have any pertinent problems on file  ,   has a past surgical history that includes Eye surgery (Left) and Abdominal surgery  ,  family history includes Breast cancer in her maternal aunt; Cancer in her father and maternal aunt; Coronary artery disease in her father; Lung cancer in her father  ,   reports that she has never smoked  She has never used smokeless tobacco  She reports current alcohol use  She reports that she does not use drugs  ,  is allergic to bee venom and erythromycin       Review of Systems   Constitutional: Negative  Respiratory: Negative  Cardiovascular: Negative  Musculoskeletal: Negative  Psychiatric/Behavioral: Negative  Objective:  /68 (BP Location: Left arm, Patient Position: Sitting, Cuff Size: Standard)   Pulse 60   Temp 97 8 °F (36 6 °C) (Tympanic)   Resp 16   Ht 5' 4" (1 626 m)   Wt 87 9 kg (193 lb 11 2 oz)   SpO2 98%   BMI 33 25 kg/m²     Lab Review  No visits with results within 2 Month(s) from this visit     Latest known visit with results is:   Annual Exam on 07/29/2019   Component Date Value    Case Report 07/29/2019                      Value:Gynecologic Cytology Report                       Case: [id number]                                  Authorizing Provider:  ALAINA Wesley          Collected:           07/29/2019 0845              Ordering Location:     [facility] Received:            07/29/2019 [address] [place] [address]  Screen:          Britni Anthony Specimen:    LIQUID-BASED PAP, SCREENING, Cervix                                                        Primary Interpretation 07/29/2019 Negative for intraepithelial lesion or malignancy     Specimen Adequacy 07/29/2019 Satisfactory for evaluation  Endocervical/transformation zone component present   Additional Information 07/29/2019                      Value: This result contains rich text formatting which cannot be displayed here   HPV Other HR 07/29/2019 Negative     HPV16 07/29/2019 Negative     HPV18 07/29/2019 Negative       Imaging: No results found  Physical Exam  Constitutional:       Appearance: She is well-developed  Cardiovascular:      Rate and Rhythm: Normal rate and regular rhythm  Heart sounds: Normal heart sounds  Pulmonary:      Effort: Pulmonary effort is normal       Breath sounds: Normal breath sounds  Musculoskeletal: Normal range of motion  Neurological:      Mental Status: She is alert and oriented to person, place, and time  Deep Tendon Reflexes: Reflexes are normal and symmetric  Psychiatric:         Behavior: Behavior normal          Thought Content:  Thought content normal          Judgment: Judgment normal

## 2020-08-17 NOTE — PATIENT INSTRUCTIONS
1  Hypertension at goal   2  Hyperlipidemia- Due for lipid panel  3  Elevated glucose- A1c in office today is  5 5  4  GERD- Stable on Omeprazole  5  Lichen Planus- Referred to dermatology  6  Herpes simplex infection- Stable on Valacyclovir  Up to date with mammogram, colonoscopy, pap  Please have your labs drawn soon and we will call you with results

## 2020-12-31 ENCOUNTER — TELEPHONE (OUTPATIENT)
Dept: ADMINISTRATIVE | Facility: OTHER | Age: 65
End: 2020-12-31

## 2020-12-31 NOTE — TELEPHONE ENCOUNTER
----- Message from Rigo Mcmillan sent at 12/30/2020  3:12 PM EST -----  Regarding: mammogram  12/30/20 3:13 PM    Hello, our patient Jack Buck has had Mammogram completed/performed  Please assist in updating the patient chart by pulling the Care Everywhere (CE) document  The date of service is 11/20/2019       Thank you,  Rigo Mcmillan  [facility] Diez

## 2021-01-04 NOTE — TELEPHONE ENCOUNTER
Upon review of the In Basket request we were able to locate, review, and update the patient chart as requested for Mammogram     Any additional questions or concerns should be emailed to the Practice Liaisons via [email or web address] email, please do not reply via In Basket      Thank you  Yesenia Garcia MA

## 2021-06-02 ENCOUNTER — TELEPHONE (OUTPATIENT)
Dept: INTERNAL MEDICINE CLINIC | Facility: CLINIC | Age: 66
End: 2021-06-02

## 2021-06-02 NOTE — TELEPHONE ENCOUNTER
----- Message from [facility], [address]  sent at 6/2/2021  1:14 PM EDT -----  Please call and ask how doing  It is almost time for her yearly visit  I would like her to schedule that this summer   thanks

## 2021-06-17 ENCOUNTER — TELEPHONE (OUTPATIENT)
Dept: INTERNAL MEDICINE CLINIC | Facility: CLINIC | Age: 66
End: 2021-06-17

## 2021-07-14 ENCOUNTER — RA CDI HCC (OUTPATIENT)
Dept: OTHER | Facility: HOSPITAL | Age: 66
End: 2021-07-14

## 2021-07-14 NOTE — PROGRESS NOTES
[facility] 75  coding opportunities          Chart reviewed, no opportunity found: CHART REVIEWED, NO OPPORTUNITY FOUND                     Patients insurance company: [employer] (Medicare Advantage and Commercial)

## 2021-07-21 ENCOUNTER — APPOINTMENT (OUTPATIENT)
Dept: LAB | Facility: CLINIC | Age: 66
End: 2021-07-21
Payer: MEDICARE

## 2021-07-21 ENCOUNTER — OFFICE VISIT (OUTPATIENT)
Dept: INTERNAL MEDICINE CLINIC | Facility: CLINIC | Age: 66
End: 2021-07-21
Payer: MEDICARE

## 2021-07-21 VITALS
WEIGHT: 177 LBS | HEART RATE: 51 BPM | SYSTOLIC BLOOD PRESSURE: 126 MMHG | BODY MASS INDEX: 30.22 KG/M2 | HEIGHT: 64 IN | DIASTOLIC BLOOD PRESSURE: 72 MMHG | OXYGEN SATURATION: 99 %

## 2021-07-21 DIAGNOSIS — Z12.11 ENCOUNTER FOR SCREENING COLONOSCOPY: ICD-10-CM
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
## 2021-07-21 DIAGNOSIS — B00.9 HERPES SIMPLEX INFECTION: ICD-10-CM

## 2021-07-21 DIAGNOSIS — L43.9 LP (LICHEN PLANUS): ICD-10-CM

## 2021-07-21 DIAGNOSIS — Z11.59 ENCOUNTER FOR HEPATITIS C SCREENING TEST FOR LOW RISK PATIENT: ICD-10-CM

## 2021-07-21 DIAGNOSIS — R73.09 ELEVATED GLUCOSE: ICD-10-CM

## 2021-07-21 DIAGNOSIS — E78.5 HYPERLIPIDEMIA, UNSPECIFIED HYPERLIPIDEMIA TYPE: ICD-10-CM

## 2021-07-21 DIAGNOSIS — L20.9 ATOPIC DERMATITIS, UNSPECIFIED TYPE: ICD-10-CM

## 2021-07-21 DIAGNOSIS — Z11.59 ENCOUNTER FOR HEPATITIS C SCREENING TEST FOR LOW RISK PATIENT: Primary | ICD-10-CM

## 2021-07-21 DIAGNOSIS — K21.9 GASTROESOPHAGEAL REFLUX DISEASE: ICD-10-CM

## 2021-07-21 DIAGNOSIS — K21.9 GASTROESOPHAGEAL REFLUX DISEASE WITHOUT ESOPHAGITIS: ICD-10-CM

## 2021-07-21 DIAGNOSIS — I10 HYPERTENSION, UNSPECIFIED TYPE: ICD-10-CM

## 2021-07-21 DIAGNOSIS — Z00.00 MEDICARE ANNUAL WELLNESS VISIT, SUBSEQUENT: ICD-10-CM

## 2021-07-21 DIAGNOSIS — Z12.39 ENCOUNTER FOR OTHER SCREENING FOR MALIGNANT NEOPLASM OF BREAST: ICD-10-CM

## 2021-07-21 DIAGNOSIS — E55.9 VITAMIN D DEFICIENCY: ICD-10-CM

## 2021-07-21 DIAGNOSIS — H92.03 OTALGIA OF BOTH EARS: ICD-10-CM

## 2021-07-21 LAB
25(OH)D3 SERPL-MCNC: 42.5 NG/ML (ref 30–100)
HCV AB SER QL: NORMAL

## 2021-07-21 PROCEDURE — 86803 HEPATITIS C AB TEST: CPT

## 2021-07-21 PROCEDURE — G0402 INITIAL PREVENTIVE EXAM: HCPCS | Performed by: NURSE PRACTITIONER

## 2021-07-21 PROCEDURE — 99214 OFFICE O/P EST MOD 30 MIN: CPT | Performed by: NURSE PRACTITIONER

## 2021-07-21 PROCEDURE — 87476 LYME DIS DNA AMP PROBE: CPT

## 2021-07-21 PROCEDURE — 1123F ACP DISCUSS/DSCN MKR DOCD: CPT | Performed by: NURSE PRACTITIONER

## 2021-07-21 PROCEDURE — 82306 VITAMIN D 25 HYDROXY: CPT

## 2021-07-21 PROCEDURE — 36415 COLL VENOUS BLD VENIPUNCTURE: CPT

## 2021-07-21 RX ORDER — CLOBETASOL PROPIONATE 0.5 MG/G
CREAM TOPICAL
Qty: 30 G | Refills: 5 | Status: SHIPPED | OUTPATIENT
Start: 2021-07-21

## 2021-07-21 RX ORDER — PREDNISONE 20 MG/1
TABLET ORAL
COMMUNITY
Start: 2021-07-19 | End: 2021-09-03

## 2021-07-21 RX ORDER — ALBUTEROL SULFATE 90 UG/1
AEROSOL, METERED RESPIRATORY (INHALATION)
COMMUNITY
Start: 2021-07-19

## 2021-07-21 RX ORDER — BENZONATATE 200 MG/1
CAPSULE ORAL
COMMUNITY
Start: 2021-07-19

## 2021-07-21 RX ORDER — AMLODIPINE BESYLATE AND BENAZEPRIL HYDROCHLORIDE 10; 20 MG/1; MG/1
1 CAPSULE ORAL DAILY
Qty: 90 CAPSULE | Refills: 3 | Status: SHIPPED | OUTPATIENT
Start: 2021-07-21

## 2021-07-21 RX ORDER — CELECOXIB 200 MG/1
CAPSULE ORAL
COMMUNITY
Start: 2021-07-15

## 2021-07-21 NOTE — PROGRESS NOTES
Assessment/Plan:    1  Medicare wellness completed  2  Bronchitis- Continue prednisone and Ventolin inhaler as needed  3  B/L otalgia- No sign of ear infection  Followed by ENT  4  Left Medial meniscus tear- Followed by Nixon Murillo 1  5  Hypertension- At goal   6  Vitamin D Deficiency- Due for Vit D level  Please have your blood work completed and we will call you with results  Follow up in one year or sooner if needed  Diagnoses and all orders for this visit:    Encounter for hepatitis C screening test for low risk patient  -     Hepatitis C antibody; Future    Medicare annual wellness visit, subsequent    Tick bite, initial encounter  -     Cancel: Lyme Antibody Profile with reflex to WB; Future  -     Lyme disease, PCR; Future    Hypertension, unspecified type  -     amLODIPine-benazepril (LOTREL) 10-20 MG per capsule;  Take 1 capsule by mouth daily    Elevated glucose    Hyperlipidemia, unspecified hyperlipidemia type    Otalgia of both ears    Gastroesophageal reflux disease without esophagitis    Vitamin D deficiency    Encounter for other screening for malignant neoplasm of breast    Encounter for screening colonoscopy    Gastroesophageal reflux disease    Atopic dermatitis, unspecified type  -     betamethasone valerate (VALISONE) 0 1 % cream; Apply topically daily  -     clobetasol (TEMOVATE) 0 05 % cream; APPLY TOPICALLY TO AFFECTED AREA 2 X PER DAY, ARMS/LEGS FOR UP TO 2 WEEKS THEN 2-3 DAYS/WEEK    LP (lichen planus)  -     betamethasone valerate (VALISONE) 0 1 % cream; Apply topically daily  -     clobetasol (TEMOVATE) 0 05 % cream; APPLY TOPICALLY TO AFFECTED AREA 2 X PER DAY, ARMS/LEGS FOR UP TO 2 WEEKS THEN 2-3 DAYS/WEEK    Herpes simplex infection    Other orders  -     albuterol (PROVENTIL HFA,VENTOLIN HFA) 90 mcg/act inhaler  -     benzonatate (TESSALON) 200 MG capsule  -     celecoxib (CeleBREX) 200 mg capsule  -     predniSONE 20 mg tablet    The patient was counseled regarding instructions for management, risk factor reductions, patient and family education,impressions, risks and benefits of treatment options, side effects of medications, importance of compliance with treatment  The treatment plan was reviewed with the patient/guardian and patient/guardian understands and agrees with the treatment plan          Current Outpatient Medications:     albuterol (PROVENTIL HFA,VENTOLIN HFA) 90 mcg/act inhaler, , Disp: , Rfl:     amLODIPine-benazepril (LOTREL) 10-20 MG per capsule, Take 1 capsule by mouth daily, Disp: 90 capsule, Rfl: 3    ascorbic acid (VITAMIN C) 500 mg tablet, Take 500 mg by mouth, Disp: , Rfl:     B Complex-Biotin-FA (HM VITAMIN B100 COMPLEX) TABS, Take by mouth, Disp: , Rfl:     benzonatate (TESSALON) 200 MG capsule, , Disp: , Rfl:     betamethasone valerate (VALISONE) 0 1 % cream, APPLY TO AFFECTED AREA ONCE DAILY, Disp: 45 g, Rfl: 0    betamethasone valerate (VALISONE) 0 1 % cream, Apply topically daily, Disp: 30 g, Rfl: 1    Calcium Carb-Cholecalciferol 600-800 MG-UNIT TABS, Take 1 tablet by mouth, Disp: , Rfl:     celecoxib (CeleBREX) 200 mg capsule, , Disp: , Rfl:     Cholecalciferol 59672 units capsule, Take 1 capsule (50,000 Units total) by mouth once a week, Disp: 12 capsule, Rfl: 3    clobetasol (TEMOVATE) 0 05 % cream, APPLY TOPICALLY TO AFFECTED AREA 2 X PER DAY, ARMS/LEGS FOR UP TO 2 WEEKS THEN 2-3 DAYS/WEEK, Disp: 30 g, Rfl: 5    EPINEPHrine (EPIPEN) 0 3 mg/0 3 mL SOAJ, Inject as directed, Disp: , Rfl:     ESTRACE VAGINAL 0 1 MG/GM vaginal cream, ONE GRAM PER VAGINA TWICE WEEKLY, Disp: 42 5 g, Rfl: 0    fluticasone (FLONASE) 50 mcg/act nasal spray, into each nostril, Disp: , Rfl:     LORazepam (ATIVAN) 1 mg tablet, Take 1 tablet (1 mg total) by mouth 2 (two) times a day, Disp: 15 tablet, Rfl: 0    Multiple Vitamin (MULTI-VITAMIN DAILY) TABS, Take by mouth, Disp: , Rfl:     neomycin-colistin-hydrocortisone-thonzonium (CORTISPORIN-TC) Wt 80 3 kg (177 lb)   SpO2 99%   BMI 30 38 kg/m²     Lab Review  No visits with results within 2 Month(s) from this visit  Latest known visit with results is:   Office Visit on 08/17/2020   Component Date Value    Hemoglobin A1C 08/17/2020 5 5         Imaging: No results found  Physical Exam  Constitutional:       Appearance: She is well-developed  HENT:      Right Ear: Tympanic membrane normal       Left Ear: Tympanic membrane normal    Cardiovascular:      Rate and Rhythm: Normal rate and regular rhythm  Heart sounds: Normal heart sounds  Pulmonary:      Effort: Pulmonary effort is normal       Breath sounds: Wheezing present  Musculoskeletal:         General: Normal range of motion  Neurological:      Mental Status: She is alert and oriented to person, place, and time  Deep Tendon Reflexes: Reflexes are normal and symmetric  Psychiatric:         Behavior: Behavior normal          Thought Content:  Thought content normal          Judgment: Judgment normal

## 2021-07-21 NOTE — PROGRESS NOTES
Assessment and Plan:     Problem List Items Addressed This Visit     None        BMI Counseling: Body mass index is 30 38 kg/m²  The BMI is above normal  Nutrition recommendations include decreasing portion sizes, encouraging healthy choices of fruits and vegetables, decreasing fast food intake, consuming healthier snacks, limiting drinks that contain sugar, moderation in carbohydrate intake, increasing intake of lean protein, reducing intake of saturated and trans fat and reducing intake of cholesterol  Exercise recommendations include exercising 3-5 times per week  No pharmacotherapy was ordered  Patient referred to PCP due to patient being overweight  Preventive health issues were discussed with patient, and age appropriate screening tests were ordered as noted in patient's After Visit Summary  Personalized health advice and appropriate referrals for health education or preventive services given if needed, as noted in patient's After Visit Summary       History of Present Illness:     Patient presents for Medicare Annual Wellness visit    Patient Care Team:  Raquel Price as PCP - General (Internal Medicine)  Ramirez Covarrubias DO as PCP - PCP-[facility] Attributed-Roster     Problem List:     Patient Active Problem List   Diagnosis    Allergic rhinitis, seasonal    Other anaphylactic reaction    Anxiety disorder    Atopic dermatitis    Claustrophobia    Depression    Elevated glucose    Elevated total protein    Esophageal reflux    Fibrocystic breast disease    Herpes simplex infection    Herpetic gingivostomatitis    Hyperlipidemia    Hypertension    Hyponatremia    LP (lichen planus)    Ophthalmoplegic migraine headache    Raynaud's disease, idiopathic    Transient visual loss    Vitamin D deficiency    Encounter for screening colonoscopy    Allergy to bee sting    Otalgia of both ears    Deformity of toenail    Encounter for other screening for malignant neoplasm of breast    Encounter for gynecological examination with Papanicolaou smear of cervix      Past Medical and Surgical History:     Past Medical History:   Diagnosis Date    Abnormal mammogram     last assessed-8/12/2014    Chest pain     last assessed-6/17/2013-non cardiac    Chronic maxillary sinusitis     last assessed-11/10/2015    Fatty liver     last [id number]    Lichen planus     Lyme disease     last assessed-6/17/2013    Raynaud's disease     Tick bites     last assessed-6/18/2013     Past Surgical History:   Procedure Laterality Date    ABDOMINAL SURGERY      EYE SURGERY Left     childhood      Family History:     Family History   Problem Relation Age of Onset    Coronary artery disease Father     Cancer Father     Lung cancer Father     Cancer Maternal Aunt     Breast cancer Maternal Aunt       Social History:     Social History     Socioeconomic History    Marital status: /Civil Union     Spouse name: Not on file    Number of children: Not on file    Years of education: Not on file    Highest education level: Not on file   Occupational History    Not on file   Tobacco Use    Smoking status: Never Smoker    Smokeless tobacco: Never Used   Vaping Use    Vaping Use: Never used   Substance and Sexual Activity    Alcohol use: Yes    Drug use: No    Sexual activity: Not Currently   Other Topics Concern    Not on file   Social History Narrative    Caffeine use - 2 cups coffee/day                         - 2 cups green tea/day     Social Determinants of Health     Financial Resource Strain:     Difficulty of Paying Living Expenses:    Food Insecurity:     Worried About Running Out of Food in the Last Year:     Ran Out of Food in the Last Year:    Transportation Needs:     Lack of Transportation (Medical):      Lack of Transportation (Non-Medical):    Physical Activity:     Days of Exercise per Week:     Minutes of Exercise per Session:    Stress:     Feeling of Stress :    Social Connections:     Frequency of Communication with Friends and Family:     Frequency of Social Gatherings with Friends and Family:     Attends Jain Services:     Active Member of Clubs or Organizations:     Attends Club or Organization Meetings:     Marital Status:    Intimate Partner Violence:     Fear of Current or Ex-Partner:     Emotionally Abused:     Physically Abused:     Sexually Abused:       Medications and Allergies:     Current Outpatient Medications   Medication Sig Dispense Refill    albuterol (PROVENTIL HFA,VENTOLIN HFA) 90 mcg/act inhaler       amLODIPine-benazepril (LOTREL) 10-20 MG per capsule Take 1 capsule by mouth daily 90 capsule 3    ascorbic acid (VITAMIN C) 500 mg tablet Take 500 mg by mouth      B Complex-Biotin-FA (HM VITAMIN B100 COMPLEX) TABS Take by mouth      benzonatate (TESSALON) 200 MG capsule       betamethasone valerate (VALISONE) 0 1 % cream APPLY TO AFFECTED AREA ONCE DAILY 45 g 0    betamethasone valerate (VALISONE) 0 1 % cream Apply topically daily 30 g 1    Calcium Carb-Cholecalciferol 600-800 MG-UNIT TABS Take 1 tablet by mouth      celecoxib (CeleBREX) 200 mg capsule       Cholecalciferol 72205 units capsule Take 1 capsule (50,000 Units total) by mouth once a week 12 capsule 3    clobetasol (TEMOVATE) 0 05 % cream APPLY TOPICALLY TO AFFECTED AREA 2 TIMES A DAY, ARMS/LEGS FOR UP TO 2 WEEKS THEN 2-3 DAYS PER WEEK  5    EPINEPHrine (EPIPEN) 0 3 mg/0 3 mL SOAJ Inject as directed      ESTRACE VAGINAL 0 1 MG/GM vaginal cream ONE GRAM PER VAGINA TWICE WEEKLY 42 5 g 0    fluticasone (FLONASE) 50 mcg/act nasal spray into each nostril      LORazepam (ATIVAN) 1 mg tablet Take 1 tablet (1 mg total) by mouth 2 (two) times a day 15 tablet 0    Multiple Vitamin (MULTI-VITAMIN DAILY) TABS Take by mouth      neomycin-colistin-hydrocortisone-thonzonium (CORTISPORIN-TC) 0 33-0 3-1-0 05 % otic suspension Administer into ears      omeprazole (PriLOSEC) 20 mg delayed release capsule Take 1 capsule (20 mg total) by mouth daily 90 capsule 3    predniSONE 20 mg tablet       Turmeric 500 MG CAPS Take by mouth      valACYclovir (VALTREX) 500 mg tablet Take 1 tablet (500 mg total) by mouth daily 90 tablet 3     No current facility-administered medications for this visit  Allergies   Allergen Reactions    Bee Venom Anaphylaxis     REQUIRES EPI PEN  REQUIRES EPI PEN    Erythromycin       Immunizations:     Immunization History   Administered Date(s) Administered    DT (pediatric) 02/07/2001    H1N1, All Formulations 11/21/2009    INFLUENZA 11/18/2008, 10/05/2010, 11/02/2011, 10/26/2017, 09/14/2018    Influenza Quadrivalent, 6-35 Months IM 11/10/2015    Influenza, injectable, quadrivalent, preservative free 0 5 mL 09/14/2018, 10/22/2019    Influenza, seasonal, injectable 11/18/2008, 10/05/2010, 11/02/2011, 10/02/2012, 10/20/2014, 10/26/2017    TD (adult) Preservative Free 02/08/2013    Td (adult), adsorbed 02/08/2013    Tdap 08/21/2014, 07/21/2017      Health Maintenance:         Topic Date Due    Hepatitis C Screening  Never done    HIV Screening  Never done    Breast Cancer Screening: Mammogram  11/30/2021    Colorectal Cancer Screening  07/23/2022    Cervical Cancer Screening  07/29/2022         Topic Date Due    COVID-19 Vaccine (1) Never done    Pneumococcal Vaccine: 65+ Years (1 of 1 - PPSV23) Never done    Influenza Vaccine (1) 09/01/2021      Medicare Health Risk Assessment:     Ht 5' 4" (1 626 m)   Wt 80 3 kg (177 lb)   BMI 30 38 kg/m²      Mirtha Schultz is here for her Subsequent Wellness visit  Health Risk Assessment:   Patient rates overall health as very good  Patient feels that their physical health rating is same  Patient is satisfied with their life  Eyesight was rated as slightly worse  Hearing was rated as same  Patient feels that their emotional and mental health rating is same   Patients states they are never, rarely angry  Patient states they are sometimes unusually tired/fatigued  Pain experienced in the last 7 days has been some  Patient's pain rating has been 6/10  Patient states that she has experienced no weight loss or gain in last 6 months  Depression Screening:   PHQ-2 Score: 0  PHQ-9 Score: 0      Fall Risk Screening: In the past year, patient has experienced: no history of falling in past year      Urinary Incontinence Screening:   Patient has not leaked urine accidently in the last six months  Home Safety:  Patient has trouble with stairs inside or outside of their home  Patient has working smoke alarms and has working carbon monoxide detector  Home safety hazards include: none  Nutrition:   Current diet is Regular  Medications:   Patient is currently taking over-the-counter supplements  OTC medications include: see medication list  Patient is able to manage medications  Activities of Daily Living (ADLs)/Instrumental Activities of Daily Living (IADLs):   Walk and transfer into and out of bed and chair?: Yes  Dress and groom yourself?: Yes    Bathe or shower yourself?: Yes    Feed yourself? Yes  Do your laundry/housekeeping?: Yes  Manage your money, pay your bills and track your expenses?: Yes  Make your own meals?: Yes    Do your own shopping?: Yes    Previous Hospitalizations:   Any hospitalizations or ED visits within the last 12 months?: No      Advance Care Planning:   Living will: Yes    Durable POA for healthcare:  Yes    Advanced directive: Yes    Five wishes given: Yes      PREVENTIVE SCREENINGS      Cardiovascular Screening:    General: Screening Not Indicated and History Lipid Disorder      Diabetes Screening:     General: Screening Current      Colorectal Cancer Screening:     General: Screening Current      Breast Cancer Screening:     General: Screening Current      Cervical Cancer Screening:    General: Screening Not Indicated      Osteoporosis Screening:    General: Risks and Benefits Discussed      Lung Cancer Screening:     General: Screening Not Indicated    Screening, Brief Intervention, and Referral to Treatment (SBIRT)    Screening  Typical number of drinks in a day: 0  Typical number of drinks in a week: 4  Interpretation: Low risk drinking behavior      Single Item Drug Screening:  How often have you used an illegal drug (including marijuana) or a prescription medication for non-medical reasons in the past year? never    Single Item Drug Screen Score: 0  Interpretation: Negative screen for possible drug use disorder      ALAINA Johnson

## 2021-07-21 NOTE — PATIENT INSTRUCTIONS
Medicare Preventive Visit Patient Instructions  Thank you for completing your Welcome to Medicare Visit or Medicare Annual Wellness Visit today  Your next wellness visit will be due in one year (7/22/2022)  The screening/preventive services that you may require over the next 5-10 years are detailed below  Some tests may not apply to you based off risk factors and/or age  Screening tests ordered at today's visit but not completed yet may show as past due  Also, please note that scanned in results may not display below  Preventive Screenings:  Service Recommendations Previous Testing/Comments   Colorectal Cancer Screening  * Colonoscopy    * Fecal Occult Blood Test (FOBT)/Fecal Immunochemical Test (FIT)  * Fecal DNA/Cologuard Test  * Flexible Sigmoidoscopy Age: 54-65 years old   Colonoscopy: every 10 years (may be performed more frequently if at higher risk)  OR  FOBT/FIT: every 1 year  OR  Cologuard: every 3 years  OR  Sigmoidoscopy: every 5 years  Screening may be recommended earlier than age 48 if at higher risk for colorectal cancer  Also, an individualized decision between you and your healthcare provider will decide whether screening between the ages of 74-80 would be appropriate  Colonoscopy: 06/14/2005  FOBT/FIT: Not on file  Cologuard: 07/23/2019  Sigmoidoscopy: Not on file    Screening Current     Breast Cancer Screening Age: 36 years old  Frequency: every 1-2 years  Not required if history of left and right mastectomy Mammogram: 11/30/2020    Screening Current   Cervical Cancer Screening Between the ages of 21-29, pap smear recommended once every 3 years  Between the ages of 33-67, can perform pap smear with HPV co-testing every 5 years     Recommendations may differ for women with a history of total hysterectomy, cervical cancer, or abnormal pap smears in past  Pap Smear: 07/29/2019    Screening Not Indicated   Hepatitis C Screening Once for adults born between 1945 and 1965  More frequently in patients at high risk for Hepatitis C Hep C Antibody: Not on file        Diabetes Screening 1-2 times per year if you're at risk for diabetes or have pre-diabetes Fasting glucose: 111 mg/dL   A1C: 5 5    Screening Current   Cholesterol Screening Once every 5 years if you don't have a lipid disorder  May order more often based on risk factors  Lipid panel: 07/26/2019    Screening Not Indicated  History Lipid Disorder     Other Preventive Screenings Covered by Medicare:  1  Abdominal Aortic Aneurysm (AAA) Screening: covered once if your at risk  You're considered to be at risk if you have a family history of AAA  2  Lung Cancer Screening: covers low dose CT scan once per year if you meet all of the following conditions: (1) Age 50-69; (2) No signs or symptoms of lung cancer; (3) Current smoker or have quit smoking within the last 15 years; (4) You have a tobacco smoking history of at least 30 pack years (packs per day multiplied by number of years you smoked); (5) You get a written order from a healthcare provider  3  Glaucoma Screening: covered annually if you're considered high risk: (1) You have diabetes OR (2) Family history of glaucoma OR (3)  aged 48 and older OR (3)  American aged 72 and older  3  Osteoporosis Screening: covered every 2 years if you meet one of the following conditions: (1) You're estrogen deficient and at risk for osteoporosis based off medical history and other findings; (2) Have a vertebral abnormality; (3) On glucocorticoid therapy for more than 3 months; (4) Have primary hyperparathyroidism; (5) On osteoporosis medications and need to assess response to drug therapy  · Last bone density test (DXA Scan): Not on file  5  HIV Screening: covered annually if you're between the age of 12-76  Also covered annually if you are younger than 13 and older than 72 with risk factors for HIV infection   For pregnant patients, it is covered up to 3 times per pregnancy  Immunizations:  Immunization Recommendations   Influenza Vaccine Annual influenza vaccination during flu season is recommended for all persons aged >= 6 months who do not have contraindications   Pneumococcal Vaccine (Prevnar and Pneumovax)  * Prevnar = PCV13  * Pneumovax = PPSV23   Adults 25-60 years old: 1-3 doses may be recommended based on certain risk factors  Adults 72 years old: Prevnar (PCV13) vaccine recommended followed by Pneumovax (PPSV23) vaccine  If already received PPSV23 since turning 65, then PCV13 recommended at least one year after PPSV23 dose  Hepatitis B Vaccine 3 dose series if at intermediate or high risk (ex: diabetes, end stage renal disease, liver disease)   Tetanus (Td) Vaccine - COST NOT COVERED BY MEDICARE PART B Following completion of primary series, a booster dose should be given every 10 years to maintain immunity against tetanus  Td may also be given as tetanus wound prophylaxis  Tdap Vaccine - COST NOT COVERED BY MEDICARE PART B Recommended at least once for all adults  For pregnant patients, recommended with each pregnancy  Shingles Vaccine (Shingrix) - COST NOT COVERED BY MEDICARE PART B  2 shot series recommended in those aged 48 and above     Health Maintenance Due:      Topic Date Due    Hepatitis C Screening  Never done    HIV Screening  Never done    Breast Cancer Screening: Mammogram  11/30/2021    Colorectal Cancer Screening  07/23/2022    Cervical Cancer Screening  07/29/2022     Immunizations Due:      Topic Date Due    COVID-19 Vaccine (1) Never done    Pneumococcal Vaccine: 65+ Years (1 of 1 - PPSV23) Never done    Influenza Vaccine (1) 09/01/2021     Advance Directives   What are advance directives? Advance directives are legal documents that state your wishes and plans for medical care  These plans are made ahead of time in case you lose your ability to make decisions for yourself   Advance directives can apply to any medical decision, such as the treatments you want, and if you want to donate organs  What are the types of advance directives? There are many types of advance directives, and each state has rules about how to use them  You may choose a combination of any of the following:  · Living will: This is a written record of the treatment you want  You can also choose which treatments you do not want, which to limit, and which to stop at a certain time  This includes surgery, medicine, IV fluid, and tube feedings  · Durable power of  for healthcare [facility]): This is a written record that states who you want to make healthcare choices for you when you are unable to make them for yourself  This person, called a proxy, is usually a family member or a friend  You may choose more than 1 proxy  · Do not resuscitate (DNR) order:  A DNR order is used in case your heart stops beating or you stop breathing  It is a request not to have certain forms of treatment, such as CPR  A DNR order may be included in other types of advance directives  · Medical directive: This covers the care that you want if you are in a coma, near death, or unable to make decisions for yourself  You can list the treatments you want for each condition  Treatment may include pain medicine, surgery, blood transfusions, dialysis, IV or tube feedings, and a ventilator (breathing machine)  · Values history: This document has questions about your views, beliefs, and how you feel and think about life  This information can help others choose the care that you would choose  Why are advance directives important? An advance directive helps you control your care  Although spoken wishes may be used, it is better to have your wishes written down  Spoken wishes can be misunderstood, or not followed  Treatments may be given even if you do not want them  An advance directive may make it easier for your family to make difficult choices about your care     Weight Management   Why it is important to manage your weight:  Being overweight increases your risk of health conditions such as heart disease, high blood pressure, type 2 diabetes, and certain types of cancer  It can also increase your risk for osteoarthritis, sleep apnea, and other respiratory problems  Aim for a slow, steady weight loss  Even a small amount of weight loss can lower your risk of health problems  How to lose weight safely:  A safe and healthy way to lose weight is to eat fewer calories and get regular exercise  You can lose up about 1 pound a week by decreasing the number of calories you eat by 500 calories each day  Healthy meal plan for weight management:  A healthy meal plan includes a variety of foods, contains fewer calories, and helps you stay healthy  A healthy meal plan includes the following:  · Eat whole-grain foods more often  A healthy meal plan should contain fiber  Fiber is the part of grains, fruits, and vegetables that is not broken down by your body  Whole-grain foods are healthy and provide extra fiber in your diet  Some examples of whole-grain foods are whole-wheat breads and pastas, oatmeal, brown rice, and bulgur  · Eat a variety of vegetables every day  Include dark, leafy greens such as spinach, kale, kasi greens, and mustard greens  Eat yellow and orange vegetables such as carrots, sweet potatoes, and winter squash  · Eat a variety of fruits every day  Choose fresh or canned fruit (canned in its own juice or light syrup) instead of juice  Fruit juice has very little or no fiber  · Eat low-fat dairy foods  Drink fat-free (skim) milk or 1% milk  Eat fat-free yogurt and low-fat cottage cheese  Try low-fat cheeses such as mozzarella and other reduced-fat cheeses  · Choose meat and other protein foods that are low in fat  Choose beans or other legumes such as split peas or lentils  Choose fish, skinless poultry (chicken or turkey), or lean cuts of red meat (beef or pork)   Before you cook meat or poultry, cut off any visible fat  · Use less fat and oil  Try baking foods instead of frying them  Add less fat, such as margarine, sour cream, regular salad dressing and mayonnaise to foods  Eat fewer high-fat foods  Some examples of high-fat foods include french fries, doughnuts, ice cream, and cakes  · Eat fewer sweets  Limit foods and drinks that are high in sugar  This includes candy, cookies, regular soda, and sweetened drinks  Exercise:  Exercise at least 30 minutes per day on most days of the week  Some examples of exercise include walking, biking, dancing, and swimming  You can also fit in more physical activity by taking the stairs instead of the elevator or parking farther away from stores  Ask your healthcare provider about the best exercise plan for you  © Copyright [employer] 2018 Information is for End User's use only and may not be sold, redistributed or otherwise used for commercial purposes  All illustrations and images included in CareNotes® are the copyrighted property of [employer]  or [address] 1 Medicare wellness completed  2  Bronchitis- Continue prednisone and Ventolin inhaler as needed  3  B/L otalgia- No sign of ear infection  Followed by ENT  4  Left Medial meniscus tear- Followed by [facility] 1  5  Hypertension- At goal   6  Vitamin D Deficiency- Due for Vit D level  Please have your blood work completed and we will call you with results  Follow up in one year or sooner if needed

## 2021-07-26 LAB — B BURGDOR DNA SPEC QL NAA+PROBE: NEGATIVE

## 2021-07-27 ENCOUNTER — TELEPHONE (OUTPATIENT)
Dept: INTERNAL MEDICINE CLINIC | Facility: CLINIC | Age: 66
End: 2021-07-27

## 2021-08-14 DIAGNOSIS — R73.09 ELEVATED GLUCOSE: ICD-10-CM

## 2021-08-14 DIAGNOSIS — B00.9 HERPES SIMPLEX INFECTION: ICD-10-CM

## 2021-08-14 DIAGNOSIS — E78.5 HYPERLIPIDEMIA, UNSPECIFIED HYPERLIPIDEMIA TYPE: ICD-10-CM

## 2021-08-14 DIAGNOSIS — Z12.11 ENCOUNTER FOR SCREENING COLONOSCOPY: ICD-10-CM

## 2021-08-14 DIAGNOSIS — I10 HYPERTENSION, UNSPECIFIED TYPE: ICD-10-CM

## 2021-08-14 DIAGNOSIS — K21.9 GASTROESOPHAGEAL REFLUX DISEASE WITHOUT ESOPHAGITIS: ICD-10-CM

## 2021-08-14 DIAGNOSIS — E55.9 VITAMIN D DEFICIENCY: ICD-10-CM

## 2021-08-14 DIAGNOSIS — Z12.39 ENCOUNTER FOR OTHER SCREENING FOR MALIGNANT NEOPLASM OF BREAST: ICD-10-CM

## 2021-08-14 DIAGNOSIS — L20.9 ATOPIC DERMATITIS, UNSPECIFIED TYPE: ICD-10-CM

## 2021-08-14 DIAGNOSIS — L43.9 LP (LICHEN PLANUS): ICD-10-CM

## 2021-08-14 DIAGNOSIS — K21.9 GASTROESOPHAGEAL REFLUX DISEASE: ICD-10-CM

## 2021-08-16 RX ORDER — OMEPRAZOLE 20 MG/1
CAPSULE, DELAYED RELEASE ORAL
Qty: 90 CAPSULE | Refills: 3 | Status: SHIPPED | OUTPATIENT
Start: 2021-08-16

## 2021-09-03 ENCOUNTER — HOSPITAL ENCOUNTER (OUTPATIENT)
Dept: RADIOLOGY | Facility: HOSPITAL | Age: 66
Discharge: HOME | End: 2021-09-03
Attending: INTERNAL MEDICINE
Payer: MEDICARE

## 2021-09-03 ENCOUNTER — OFFICE VISIT (OUTPATIENT)
Dept: INTERNAL MEDICINE CLINIC | Facility: CLINIC | Age: 66
End: 2021-09-03
Payer: MEDICARE

## 2021-09-03 VITALS
SYSTOLIC BLOOD PRESSURE: 160 MMHG | HEIGHT: 64 IN | WEIGHT: 173.6 LBS | BODY MASS INDEX: 29.64 KG/M2 | HEART RATE: 60 BPM | OXYGEN SATURATION: 99 % | TEMPERATURE: 98.4 F | DIASTOLIC BLOOD PRESSURE: 78 MMHG

## 2021-09-03 DIAGNOSIS — J40 BRONCHITIS: ICD-10-CM

## 2021-09-03 DIAGNOSIS — R06.2 WHEEZING: ICD-10-CM

## 2021-09-03 DIAGNOSIS — J40 BRONCHITIS: Primary | ICD-10-CM

## 2021-09-03 PROCEDURE — 99214 OFFICE O/P EST MOD 30 MIN: CPT | Performed by: INTERNAL MEDICINE

## 2021-09-03 PROCEDURE — 71046 X-RAY EXAM CHEST 2 VIEWS: CPT

## 2021-09-03 RX ORDER — METHYLPREDNISOLONE 4 MG/1
TABLET ORAL
Qty: 21 EACH | Refills: 0 | Status: SHIPPED | OUTPATIENT
Start: 2021-09-03

## 2021-09-03 RX ORDER — AMOXICILLIN AND CLAVULANATE POTASSIUM 875; 125 MG/1; MG/1
1 TABLET, FILM COATED ORAL EVERY 12 HOURS SCHEDULED
Qty: 20 TABLET | Refills: 0 | Status: SHIPPED | OUTPATIENT
Start: 2021-09-03 | End: 2021-09-13

## 2021-09-03 NOTE — PROGRESS NOTES
BMI Counseling: There is no height or weight on file to calculate BMI  The BMI is above normal  Nutrition recommendations include encouraging healthy choices of fruits and vegetables  Depression Screening and Follow-up Plan: Clincally patient does not have depression  No treatment is required  Falls Plan of Care: Medications that increase falls were reviewed  Assessment/Plan:    No problem-specific Assessment & Plan notes found for this encounter  Diagnoses and all orders for this visit:    Bronchitis  -     XR chest pa & lateral; Future  -     amoxicillin-clavulanate (Augmentin) 875-125 mg per tablet; Take 1 tablet by mouth every 12 (twelve) hours for 10 days  -     methylPREDNISolone 4 MG tablet therapy pack; Use as directed on package    Wheezing  -     XR chest pa & lateral; Future  -     methylPREDNISolone 4 MG tablet therapy pack; Use as directed on package          Subjective:      Patient ID: Cahntell Trejo is a 72 y o  female  Patient is here with complaints of cough and shortness of breath  She states that she had seen in urgent care and was diagnosed with acute bronchitis  She was given steroids and told that it was viral   She thought that it would go away but it has not it has gotten worse  She also has a  so is around a lot of pollen  No fever no chills  Some wheezing on exam   She is not a smoker  No fever no chills  The following portions of the patient's history were reviewed and updated as appropriate:   She  has a past medical history of Abnormal mammogram, Chest pain, Chronic maxillary sinusitis, Fatty liver, Lichen planus, Lyme disease, Raynaud's disease, and Tick bites    She   Patient Active Problem List    Diagnosis Date Noted    Encounter for hepatitis C screening test for low risk patient 07/21/2021    Medicare annual wellness visit, subsequent 07/21/2021    Encounter for gynecological examination with Papanicolaou smear of cervix 07/29/2019    Encounter for other screening for malignant neoplasm of breast 06/28/2019    Herpetic gingivostomatitis 08/23/2018    Encounter for screening colonoscopy 08/23/2018    Allergy to bee sting 08/23/2018    Otalgia of both ears 08/23/2018    Deformity of toenail 08/23/2018    Elevated total protein 01/23/2018    Hyponatremia 01/23/2018    Allergic rhinitis, seasonal 01/19/2017    Claustrophobia 82/24/5293    LP (lichen planus) 86/58/6908    Elevated glucose 06/29/2016    Depression 08/21/2014    Fibrocystic breast disease 08/21/2014    Herpes simplex infection 08/16/2013    Other anaphylactic reaction 06/18/2013    Vitamin D deficiency 06/18/2013    Anxiety disorder 06/17/2013    Hyperlipidemia 06/17/2013    Hypertension 06/17/2013    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    Raynaud's disease, idiopathic 06/17/2013    Transient visual loss 06/17/2013    Atopic dermatitis 12/09/2012    Esophageal reflux 12/09/2012     Current Outpatient Medications   Medication Sig Dispense Refill    albuterol (PROVENTIL HFA,VENTOLIN HFA) 90 mcg/act inhaler       amLODIPine-benazepril (LOTREL) 10-20 MG per capsule Take 1 capsule by mouth daily 90 capsule 3    ascorbic acid (VITAMIN C) 500 mg tablet Take 500 mg by mouth      B Complex-Biotin-FA (HM VITAMIN B100 COMPLEX) TABS Take by mouth      betamethasone valerate (VALISONE) 0 1 % cream APPLY TO AFFECTED AREA ONCE DAILY 45 g 0    betamethasone valerate (VALISONE) 0 1 % cream Apply topically daily 30 g 1    Calcium Carb-Cholecalciferol 600-800 MG-UNIT TABS Take 1 tablet by mouth      celecoxib (CeleBREX) 200 mg capsule       Cholecalciferol 39067 units capsule Take 1 capsule (50,000 Units total) by mouth once a week 12 capsule 3    clobetasol (TEMOVATE) 0 05 % cream APPLY TOPICALLY TO AFFECTED AREA 2 X PER DAY, ARMS/LEGS FOR UP TO 2 WEEKS THEN 2-3 DAYS/WEEK 30 g 5    EPINEPHrine (EPIPEN) 0 3 mg/0 3 mL SOAJ Inject as directed      ESTRACE VAGINAL 0 1 MG/GM vaginal cream ONE GRAM PER VAGINA TWICE WEEKLY 42 5 g 0    Multiple Vitamin (MULTI-VITAMIN DAILY) TABS Take by mouth      omeprazole (PriLOSEC) 20 mg delayed release capsule TAKE 1 CAPSULE BY MOUTH EVERY DAY 90 capsule 3    Turmeric 500 MG CAPS Take by mouth      valACYclovir (VALTREX) 500 mg tablet Take 1 tablet (500 mg total) by mouth daily 90 tablet 3    amoxicillin-clavulanate (Augmentin) 875-125 mg per tablet Take 1 tablet by mouth every 12 (twelve) hours for 10 days 20 tablet 0    benzonatate (TESSALON) 200 MG capsule  (Patient not taking: Reported on 9/3/2021)      fluticasone (FLONASE) 50 mcg/act nasal spray into each nostril (Patient not taking: Reported on 9/3/2021)      LORazepam (ATIVAN) 1 mg tablet Take 1 tablet (1 mg total) by mouth 2 (two) times a day (Patient not taking: Reported on 9/3/2021) 15 tablet 0    methylPREDNISolone 4 MG tablet therapy pack Use as directed on package 21 each 0    neomycin-colistin-hydrocortisone-thonzonium (CORTISPORIN-TC) 0 33-0 3-1-0 05 % otic suspension Administer into ears (Patient not taking: Reported on 9/3/2021)       No current facility-administered medications for this visit       Review of Systems   HENT: Positive for congestion and postnasal drip  Respiratory: Positive for cough and wheezing  All other systems reviewed and are negative  Objective:      /78 (BP Location: Right arm, Patient Position: Sitting, Cuff Size: Adult)   Pulse 60   Temp 98 4 °F (36 9 °C) (Tympanic)   Ht 5' 4" (1 626 m)   Wt 78 7 kg (173 lb 9 6 oz)   SpO2 99%   BMI 29 80 kg/m²          Physical Exam  Vitals and nursing note reviewed  Constitutional:       Appearance: Normal appearance  HENT:      Head: Normocephalic and atraumatic  Right Ear: Tympanic membrane normal       Left Ear: Tympanic membrane normal       Mouth/Throat:      Mouth: Mucous membranes are moist    Cardiovascular:      Rate and Rhythm: Normal rate     Pulmonary: Effort: Pulmonary effort is normal       Breath sounds: Wheezing present  Musculoskeletal:         General: Normal range of motion  Cervical back: Normal range of motion  Lymphadenopathy:      Cervical: No cervical adenopathy  Skin:     General: Skin is warm  Coloration: Skin is pale  Neurological:      General: No focal deficit present  Mental Status: She is alert and oriented to person, place, and time  Psychiatric:         Mood and Affect: Mood normal          Behavior: Behavior normal          Thought Content:  Thought content normal          Judgment: Judgment normal

## 2021-09-07 ENCOUNTER — TELEPHONE (OUTPATIENT)
Dept: INTERNAL MEDICINE CLINIC | Facility: CLINIC | Age: 66
End: 2021-09-07

## 2021-09-07 NOTE — TELEPHONE ENCOUNTER
Spoke to patient  Patient states she is still taking her antibiotics  States she has a heavy mucous she seems to not be able to cough up  I advised patient to please continue her antibiotics in entirety  Patient stated she will finish course of antibiotics and if no improvement she will call office  However I advised her I would forward message of her current symptoms

## 2021-09-07 NOTE — TELEPHONE ENCOUNTER
----- Message from Kassandra Pavon MD sent at 9/7/2021  7:54 AM EDT -----  Marilyn Headings cxr is negative

## 2022-08-22 DIAGNOSIS — L43.9 LP (LICHEN PLANUS): ICD-10-CM

## 2022-08-22 DIAGNOSIS — L20.9 ATOPIC DERMATITIS, UNSPECIFIED TYPE: ICD-10-CM

## 2022-08-22 RX ORDER — CLOBETASOL PROPIONATE 0.5 MG/G
CREAM TOPICAL
Qty: 30 G | Refills: 5 | Status: SHIPPED | OUTPATIENT
Start: 2022-08-22

## 2022-09-18 ENCOUNTER — RA CDI HCC (OUTPATIENT)
Dept: OTHER | Facility: HOSPITAL | Age: 67
End: 2022-09-18

## 2022-09-18 NOTE — PROGRESS NOTES
PHQ negative, not on meds  HCC coding opportunities       Chart reviewed, no opportunity found: CHART REVIEWED, NO OPPORTUNITY FOUND        Patients Insurance     Medicare Insurance: Medicare

## 2022-09-26 ENCOUNTER — OFFICE VISIT (OUTPATIENT)
Dept: INTERNAL MEDICINE CLINIC | Facility: CLINIC | Age: 67
End: 2022-09-26
Payer: MEDICARE

## 2022-09-26 VITALS
RESPIRATION RATE: 18 BRPM | HEART RATE: 64 BPM | OXYGEN SATURATION: 97 % | BODY MASS INDEX: 30.77 KG/M2 | HEIGHT: 64 IN | DIASTOLIC BLOOD PRESSURE: 80 MMHG | TEMPERATURE: 99.2 F | SYSTOLIC BLOOD PRESSURE: 142 MMHG | WEIGHT: 180.2 LBS

## 2022-09-26 DIAGNOSIS — E55.9 VITAMIN D DEFICIENCY: ICD-10-CM

## 2022-09-26 DIAGNOSIS — Z12.11 SCREENING FOR MALIGNANT NEOPLASM OF COLON: ICD-10-CM

## 2022-09-26 DIAGNOSIS — L43.9 LP (LICHEN PLANUS): ICD-10-CM

## 2022-09-26 DIAGNOSIS — B00.9 HERPES SIMPLEX INFECTION: ICD-10-CM

## 2022-09-26 DIAGNOSIS — R73.09 ELEVATED GLUCOSE: ICD-10-CM

## 2022-09-26 DIAGNOSIS — Z00.00 MEDICARE ANNUAL WELLNESS VISIT, SUBSEQUENT: Primary | ICD-10-CM

## 2022-09-26 DIAGNOSIS — F41.9 ANXIETY DISORDER, UNSPECIFIED TYPE: ICD-10-CM

## 2022-09-26 DIAGNOSIS — Z12.31 ENCOUNTER FOR SCREENING MAMMOGRAM FOR MALIGNANT NEOPLASM OF BREAST: ICD-10-CM

## 2022-09-26 DIAGNOSIS — K21.9 GASTROESOPHAGEAL REFLUX DISEASE: ICD-10-CM

## 2022-09-26 DIAGNOSIS — K21.9 GASTROESOPHAGEAL REFLUX DISEASE WITHOUT ESOPHAGITIS: ICD-10-CM

## 2022-09-26 DIAGNOSIS — G89.29 CHRONIC PAIN OF BOTH KNEES: ICD-10-CM

## 2022-09-26 DIAGNOSIS — J30.2 SEASONAL ALLERGIC RHINITIS, UNSPECIFIED TRIGGER: ICD-10-CM

## 2022-09-26 DIAGNOSIS — Z23 FLU VACCINE NEED: ICD-10-CM

## 2022-09-26 DIAGNOSIS — L20.9 ATOPIC DERMATITIS, UNSPECIFIED TYPE: ICD-10-CM

## 2022-09-26 DIAGNOSIS — Z12.11 ENCOUNTER FOR SCREENING COLONOSCOPY: ICD-10-CM

## 2022-09-26 DIAGNOSIS — Z12.39 ENCOUNTER FOR OTHER SCREENING FOR MALIGNANT NEOPLASM OF BREAST: ICD-10-CM

## 2022-09-26 DIAGNOSIS — M25.561 CHRONIC PAIN OF BOTH KNEES: ICD-10-CM

## 2022-09-26 DIAGNOSIS — I10 HYPERTENSION, UNSPECIFIED TYPE: ICD-10-CM

## 2022-09-26 DIAGNOSIS — M25.562 CHRONIC PAIN OF BOTH KNEES: ICD-10-CM

## 2022-09-26 DIAGNOSIS — E78.5 HYPERLIPIDEMIA, UNSPECIFIED HYPERLIPIDEMIA TYPE: ICD-10-CM

## 2022-09-26 PROCEDURE — 99214 OFFICE O/P EST MOD 30 MIN: CPT | Performed by: NURSE PRACTITIONER

## 2022-09-26 PROCEDURE — 90662 IIV NO PRSV INCREASED AG IM: CPT | Performed by: NURSE PRACTITIONER

## 2022-09-26 PROCEDURE — G0439 PPPS, SUBSEQ VISIT: HCPCS | Performed by: NURSE PRACTITIONER

## 2022-09-26 PROCEDURE — G0008 ADMIN INFLUENZA VIRUS VAC: HCPCS | Performed by: NURSE PRACTITIONER

## 2022-09-26 RX ORDER — CELECOXIB 200 MG/1
200 CAPSULE ORAL DAILY
Qty: 90 CAPSULE | Refills: 1 | Status: SHIPPED | OUTPATIENT
Start: 2022-09-26

## 2022-09-26 RX ORDER — PREDNISONE 10 MG/1
10 TABLET ORAL DAILY
Qty: 5 TABLET | Refills: 0 | Status: SHIPPED | OUTPATIENT
Start: 2022-09-26 | End: 2022-10-26 | Stop reason: ALTCHOICE

## 2022-09-26 RX ORDER — ESCITALOPRAM OXALATE 5 MG/1
5 TABLET ORAL DAILY
Qty: 30 TABLET | Refills: 0 | Status: SHIPPED | OUTPATIENT
Start: 2022-09-26 | End: 2022-10-18

## 2022-09-26 RX ORDER — ALBUTEROL SULFATE 90 UG/1
1 AEROSOL, METERED RESPIRATORY (INHALATION) EVERY 6 HOURS PRN
Qty: 18 G | Refills: 5 | Status: SHIPPED | OUTPATIENT
Start: 2022-09-26

## 2022-09-26 RX ORDER — OMEPRAZOLE 20 MG/1
20 CAPSULE, DELAYED RELEASE ORAL DAILY
Qty: 90 CAPSULE | Refills: 3 | Status: SHIPPED | OUTPATIENT
Start: 2022-09-26

## 2022-09-26 NOTE — PROGRESS NOTES
Assessment/Plan:    1  Medicare wellness completed  Cologuard ordered  2  Anxiety- Start Escitalopram and follow up in four weeks  3  Knee pain- Continue Celebrex, follow up with ortho  4  Allergic rhinitis- Recent wheezing, usually at this time of year  Continue Rescue inhaler as needed  5  Hypertension- Near goal  Continue present medication  6  GERD- Stable  Continue Omeprazole  7  Allergic rhinitis- Some wheezing  Continue using rescue inhaler as needed  Start low dose prednisone x 5 days  Follow up in four weeks, labs prior  Diagnoses and all orders for this visit:    Medicare annual wellness visit, subsequent    Seasonal allergic rhinitis, unspecified trigger  -     albuterol (PROVENTIL HFA,VENTOLIN HFA) 90 mcg/act inhaler; Inhale 1 puff every 6 (six) hours as needed for wheezing  -     predniSONE 10 mg tablet; Take 1 tablet (10 mg total) by mouth daily    Screening for malignant neoplasm of colon  -     Cologuard    Chronic pain of both knees  -     celecoxib (CeleBREX) 200 mg capsule; Take 1 capsule (200 mg total) by mouth daily    Hypertension, unspecified type  -     CBC and differential; Future  -     Comprehensive metabolic panel; Future  -     TSH, 3rd generation with Free T4 reflex; Future  -     omeprazole (PriLOSEC) 20 mg delayed release capsule; Take 1 capsule (20 mg total) by mouth daily    Hyperlipidemia, unspecified hyperlipidemia type  -     Lipid panel; Future  -     omeprazole (PriLOSEC) 20 mg delayed release capsule; Take 1 capsule (20 mg total) by mouth daily    Elevated glucose  -     HEMOGLOBIN A1C W/ EAG ESTIMATION; Future  -     omeprazole (PriLOSEC) 20 mg delayed release capsule; Take 1 capsule (20 mg total) by mouth daily    Anxiety disorder, unspecified type  -     escitalopram (LEXAPRO) 5 mg tablet; Take 1 tablet (5 mg total) by mouth daily    Gastroesophageal reflux disease  -     omeprazole (PriLOSEC) 20 mg delayed release capsule;  Take 1 capsule (20 mg total) by mouth daily    Gastroesophageal reflux disease without esophagitis  -     omeprazole (PriLOSEC) 20 mg delayed release capsule; Take 1 capsule (20 mg total) by mouth daily    Vitamin D deficiency  -     omeprazole (PriLOSEC) 20 mg delayed release capsule; Take 1 capsule (20 mg total) by mouth daily    Encounter for other screening for malignant neoplasm of breast  -     omeprazole (PriLOSEC) 20 mg delayed release capsule; Take 1 capsule (20 mg total) by mouth daily  -     Mammo screening bilateral w 3d & cad; Future    Encounter for screening colonoscopy  -     omeprazole (PriLOSEC) 20 mg delayed release capsule; Take 1 capsule (20 mg total) by mouth daily    Atopic dermatitis, unspecified type  -     omeprazole (PriLOSEC) 20 mg delayed release capsule; Take 1 capsule (20 mg total) by mouth daily    LP (lichen planus)  -     omeprazole (PriLOSEC) 20 mg delayed release capsule; Take 1 capsule (20 mg total) by mouth daily    Herpes simplex infection  -     omeprazole (PriLOSEC) 20 mg delayed release capsule; Take 1 capsule (20 mg total) by mouth daily    Encounter for screening mammogram for malignant neoplasm of breast   -     Mammo screening bilateral w 3d & cad; Future        The patient was counseled regarding instructions for management, risk factor reductions, patient and family education,impressions, risks and benefits of treatment options, side effects of medications, importance of compliance with treatment  The treatment plan was reviewed with the patient/guardian and patient/guardian understands and agrees with the treatment plan              Current Outpatient Medications:     albuterol (PROVENTIL HFA,VENTOLIN HFA) 90 mcg/act inhaler, Inhale 1 puff every 6 (six) hours as needed for wheezing, Disp: 18 g, Rfl: 5    amLODIPine-benazepril (LOTREL) 10-20 MG per capsule, Take 1 capsule by mouth daily, Disp: 90 capsule, Rfl: 3    ascorbic acid (VITAMIN C) 500 mg tablet, Take 500 mg by mouth, Disp: , Rfl:     B Complex-Biotin-FA (HM VITAMIN B100 COMPLEX) TABS, Take by mouth, Disp: , Rfl:     benzonatate (TESSALON) 200 MG capsule, , Disp: , Rfl:     betamethasone valerate (VALISONE) 0 1 % cream, APPLY TO AFFECTED AREA TOPICALLY EVERY DAY, Disp: 30 g, Rfl: 1    Calcium Carb-Cholecalciferol 600-800 MG-UNIT TABS, Take 1 tablet by mouth, Disp: , Rfl:     celecoxib (CeleBREX) 200 mg capsule, Take 1 capsule (200 mg total) by mouth daily, Disp: 90 capsule, Rfl: 1    clobetasol (TEMOVATE) 0 05 % cream, APPLY TOPICALLY TO AFFECTED AREA 2 X PER DAY, ARMS/LEGS FOR UP TO 2 WEEKS THEN 2-3 DAYS/WEEK, Disp: 30 g, Rfl: 5    EPINEPHrine (EPIPEN) 0 3 mg/0 3 mL SOAJ, Inject as directed, Disp: , Rfl:     escitalopram (LEXAPRO) 5 mg tablet, Take 1 tablet (5 mg total) by mouth daily, Disp: 30 tablet, Rfl: 0    ESTRACE VAGINAL 0 1 MG/GM vaginal cream, ONE GRAM PER VAGINA TWICE WEEKLY, Disp: 42 5 g, Rfl: 0    LORazepam (ATIVAN) 1 mg tablet, Take 1 tablet (1 mg total) by mouth 2 (two) times a day, Disp: 15 tablet, Rfl: 0    Multiple Vitamin (MULTI-VITAMIN DAILY) TABS, Take by mouth, Disp: , Rfl:     omeprazole (PriLOSEC) 20 mg delayed release capsule, Take 1 capsule (20 mg total) by mouth daily, Disp: 90 capsule, Rfl: 3    predniSONE 10 mg tablet, Take 1 tablet (10 mg total) by mouth daily, Disp: 5 tablet, Rfl: 0    Turmeric 500 MG CAPS, Take by mouth, Disp: , Rfl:     fluticasone (FLONASE) 50 mcg/act nasal spray, into each nostril (Patient not taking: No sig reported), Disp: , Rfl:     neomycin-colistin-hydrocortisone-thonzonium (CORTISPORIN TC) 0 33-0 3-1-0 05 % otic suspension, Administer into ears (Patient not taking: No sig reported), Disp: , Rfl:     valACYclovir (VALTREX) 500 mg tablet, Take 1 tablet (500 mg total) by mouth daily, Disp: 90 tablet, Rfl: 3    Subjective:      Patient ID: Alphonso Valero is a 79 y o  female  Having increased anxiety symptoms  Knees are hurting, taking Celebrex   She follows with [facility] ortho, looking to see a different provider  The following portions of the patient's history were reviewed and updated as appropriate:   She has a past medical history of Abnormal mammogram, Allergic, Arthritis, Chest pain, Chronic maxillary sinusitis, COPD (chronic obstructive pulmonary disease) ([address] ), Fatty liver, GERD (gastroesophageal reflux disease), Hypertension, Lichen planus, Lyme disease, Raynaud's disease, and Tick bites  ,  does not have any pertinent problems on file  ,   has a past surgical history that includes Eye surgery (Left) and Abdominal surgery  ,  family history includes Breast cancer in her maternal aunt; Cancer in her father and maternal aunt; Coronary artery disease in her father; Lung cancer in her father  ,   reports that she has never smoked  She has never used smokeless tobacco  She reports current alcohol use of about 2 0 standard drinks of alcohol per week  She reports that she does not use drugs  ,  is allergic to bee venom       Review of Systems   Constitutional: Negative  Respiratory: Positive for wheezing  Cardiovascular: Negative  Musculoskeletal: Positive for arthralgias  Psychiatric/Behavioral: The patient is nervous/anxious  Objective:  /80   Pulse 64   Temp 99 2 °F (37 3 °C) (Tympanic)   Resp 18   Ht 5' 4" (1 626 m)   Wt 81 7 kg (180 lb 3 2 oz)   SpO2 97%   BMI 30 93 kg/m²     Lab Review  No visits with results within 2 Month(s) from this visit  Latest known visit with results is:   Appointment on 07/21/2021   Component Date Value    Vit D, 25-Hydroxy 07/21/2021 42 5     Hepatitis C Ab 07/21/2021 Non-reactive     Lyme Disease(B burgdorfe* 07/21/2021 Negative         Imaging: No results found  Physical Exam  Constitutional:       Appearance: She is well-developed  Cardiovascular:      Rate and Rhythm: Normal rate and regular rhythm  Heart sounds: Normal heart sounds     Pulmonary:      Effort: Pulmonary effort is normal  Breath sounds: Wheezing present  Musculoskeletal:         General: Normal range of motion  Neurological:      Mental Status: She is alert and oriented to person, place, and time  Deep Tendon Reflexes: Reflexes are normal and symmetric  Psychiatric:         Behavior: Behavior normal          Thought Content: Thought content normal          Judgment: Judgment normal            Answers for HPI/ROS submitted by the patient on 9/20/2022  How would you rate your overall health?: good  Compared to last year, how is your physical health?: slightly better  In general, how satisfied are you with your life?: very satisfied  Compared to last year, how is your eyesight?: same  Compared to last year, how is your hearing?: same  Compared to last year, how is your emotional/mental health?: same  How often is anger a problem for you?: sometimes  How often do you feel unusually tired/fatigued?: never, rarely  In the past 7 days, how much pain have you experienced?: none  In the past 6 months, have you lost or gained 10 pounds without trying?: No  One or more falls in the last year: No  In the past 6 months, have you accidentally leaked urine?: Yes  Do you have trouble with the stairs inside or outside your home?: No  Does your home have working smoke alarms?: Yes  Does your home have a carbon monoxide monitor?: Yes  Which safety hazards (if any) have you experienced in your home? Please select all that apply : none  How would you describe your current diet?  Please select all that apply : Low Carb  In addition to prescription medications, are you taking any over-the-counter supplements?: Yes  If yes, what supplements are you taking?: C D Bcomplex multi-vit  Can you manage your medications?: Yes  Are you currently taking any opioid medications?: No  Can you walk and transfer into and out of your bed and chair?: Yes  Can you dress and groom yourself?: Yes  Can you bathe or shower yourself?: Yes  Can you feed yourself?: Yes  Can you do your laundry/ housekeeping?: Yes  Can you manage your money, pay your bills, and track your expenses?: Yes  Can you make your own meals?: Yes  Can you do your own shopping?: Yes  Within the last 12 months, have you had any hospitalizations or Emergency Department visits?: No  Do you have a living will?: No  Do you have a Durable POA (Power of ) for healthcare decisions?: No  Do you have an Advanced Directive for end of life decisions?: No  How often have you used an illegal drug (including marijuana) or a prescription medication for non-medical reasons in the past year?: never  What is the typical number of drinks you consume in a day?: 0  What is the typical number of drinks you consume in a week?: 2  How often did you have a drink containing alcohol in the past year?: 2 to 4 times a month  How many drinks did you have on a typical day  when you were drinking in the past year?: 0  How often did you have 6 or more drinks on one occasion in the past year?: never

## 2022-09-26 NOTE — PROGRESS NOTES
Assessment and Plan:     Problem List Items Addressed This Visit        Other    Medicare annual wellness visit, subsequent - Primary        BMI Counseling: Body mass index is 30 93 kg/m²  The BMI is above normal  Nutrition recommendations include decreasing portion sizes, encouraging healthy choices of fruits and vegetables, decreasing fast food intake, consuming healthier snacks, limiting drinks that contain sugar, moderation in carbohydrate intake, increasing intake of lean protein, reducing intake of saturated and trans fat and reducing intake of cholesterol  Exercise recommendations include exercising 3-5 times per week  No pharmacotherapy was ordered  Patient referred to PCP  Rationale for BMI follow-up plan is due to patient being overweight or obese  Preventive health issues were discussed with patient, and age appropriate screening tests were ordered as noted in patient's After Visit Summary  Personalized health advice and appropriate referrals for health education or preventive services given if needed, as noted in patient's After Visit Summary  History of Present Illness:     Patient presents for a Medicare Wellness Visit    Here for annual physical  She does complain of occasional wheezing, had been seen twice about one year ago, for cough, wheezing  CXR was unremarkable  She now has the same symptoms  Patient Care Team:  Mike Herrera as PCP - General (Internal Medicine)  Jacqueline Rollins DO as PCP - PCP-[facility] Attributed-Roster     Review of Systems:     Review of Systems   Constitutional: Negative for chills and fever  HENT: Negative for ear pain, rhinorrhea, sinus pain and sore throat  Eyes: Negative for redness and visual disturbance  Respiratory: Positive for wheezing  Negative for cough, chest tightness and shortness of breath  Cardiovascular: Negative for chest pain and palpitations     Gastrointestinal: Negative for abdominal pain, constipation, diarrhea, nausea and vomiting  Endocrine: Negative  Genitourinary: Negative for dysuria, frequency and urgency  Musculoskeletal: Negative for arthralgias, back pain and myalgias  B/L knee pain   Skin: Negative for rash  Neurological: Negative for dizziness, numbness and headaches  Psychiatric/Behavioral: Negative           Problem List:     Patient Active Problem List   Diagnosis    Allergic rhinitis, seasonal    Other anaphylactic reaction    Anxiety disorder    Atopic dermatitis    Claustrophobia    Depression    Elevated glucose    Elevated total protein    Esophageal reflux    Fibrocystic breast disease    Herpes simplex infection    Herpetic gingivostomatitis    Hyperlipidemia    Hypertension    Hyponatremia    LP (lichen planus)    Ophthalmoplegic migraine headache    Raynaud's disease, idiopathic    Transient visual loss    Vitamin D deficiency    Encounter for screening colonoscopy    Allergy to bee sting    Otalgia of both ears    Deformity of toenail    Encounter for other screening for malignant neoplasm of breast    Encounter for gynecological examination with Papanicolaou smear of cervix    Encounter for hepatitis C screening test for low risk patient    Medicare annual wellness visit, subsequent      Past Medical and Surgical History:     Past Medical History:   Diagnosis Date    Abnormal mammogram     last assessed-8/12/2014    Allergic     Arthritis     Chest pain     last assessed-6/17/2013-non cardiac    Chronic maxillary sinusitis     last assessed-11/10/2015    COPD (chronic obstructive pulmonary disease) ([facility] [address]     Fatty liver     last assessed-6/11/2014    GERD (gastroesophageal reflux disease)     Hypertension     Lichen planus     Lyme disease     last assessed-6/17/2013    Raynaud's disease     Tick bites     last assessed-6/18/2013     Past Surgical History:   Procedure Laterality Date    ABDOMINAL SURGERY      EYE SURGERY Left childhood      Family History:     Family History   Problem Relation Age of Onset    Coronary artery disease Father     Cancer Father     Lung cancer Father     Cancer Maternal Aunt     Breast cancer Maternal Aunt       Social History:     Social History     Socioeconomic History    Marital status: /Civil Union     Spouse name: None    Number of children: None    Years of education: None    Highest education level: None   Occupational History    None   Tobacco Use    Smoking status: Never Smoker    Smokeless tobacco: Never Used   Vaping Use    Vaping Use: Never used   Substance and Sexual Activity    Alcohol use: Yes     Alcohol/week: 2 0 standard drinks     Types: 2 Glasses of wine per week    Drug use: No    Sexual activity: Not Currently     Partners: Male     Birth control/protection: Abstinence   Other Topics Concern    None   Social History Narrative    Caffeine use - 2 cups coffee/day                         - 2 cups green tea/day     Social Determinants of Health     Financial Resource Strain: Low Risk     Difficulty of Paying Living Expenses: Not hard at all   Food Insecurity: Not on file   Transportation Needs: Unknown    Lack of Transportation (Medical): Not on file    Lack of Transportation (Non-Medical):  No   Physical Activity: Not on file   Stress: Not on file   Social Connections: Not on file   Intimate Partner Violence: Not on file   Housing Stability: Not on file      Medications and Allergies:     Current Outpatient Medications   Medication Sig Dispense Refill    albuterol (PROVENTIL HFA,VENTOLIN HFA) 90 mcg/act inhaler       amLODIPine-benazepril (LOTREL) 10-20 MG per capsule Take 1 capsule by mouth daily 90 capsule 3    ascorbic acid (VITAMIN C) 500 mg tablet Take 500 mg by mouth      B Complex-Biotin-FA (HM VITAMIN B100 COMPLEX) TABS Take by mouth      benzonatate (TESSALON) 200 MG capsule       betamethasone valerate (VALISONE) 0 1 % cream APPLY TO AFFECTED AREA ONCE DAILY 45 g 0    betamethasone valerate (VALISONE) 0 1 % cream APPLY TO AFFECTED AREA TOPICALLY EVERY DAY 30 g 1    Calcium Carb-Cholecalciferol 600-800 MG-UNIT TABS Take 1 tablet by mouth      celecoxib (CeleBREX) 200 mg capsule       clobetasol (TEMOVATE) 0 05 % cream APPLY TOPICALLY TO AFFECTED AREA 2 X PER DAY, ARMS/LEGS FOR UP TO 2 WEEKS THEN 2-3 DAYS/WEEK 30 g 5    EPINEPHrine (EPIPEN) 0 3 mg/0 3 mL SOAJ Inject as directed      ESTRACE VAGINAL 0 1 MG/GM vaginal cream ONE GRAM PER VAGINA TWICE WEEKLY 42 5 g 0    LORazepam (ATIVAN) 1 mg tablet Take 1 tablet (1 mg total) by mouth 2 (two) times a day 15 tablet 0    methylPREDNISolone 4 MG tablet therapy pack Use as directed on package 21 each 0    Multiple Vitamin (MULTI-VITAMIN DAILY) TABS Take by mouth      omeprazole (PriLOSEC) 20 mg delayed release capsule TAKE 1 CAPSULE BY MOUTH EVERY DAY 90 capsule 3    Turmeric 500 MG CAPS Take by mouth      fluticasone (FLONASE) 50 mcg/act nasal spray into each nostril (Patient not taking: No sig reported)      neomycin-colistin-hydrocortisone-thonzonium (CORTISPORIN TC) 0 33-0 3-1-0 05 % otic suspension Administer into ears (Patient not taking: No sig reported)      valACYclovir (VALTREX) 500 mg tablet Take 1 tablet (500 mg total) by mouth daily 90 tablet 3     No current facility-administered medications for this visit       Allergies   Allergen Reactions    Bee Venom Anaphylaxis     REQUIRES EPI PEN  REQUIRES EPI PEN      Immunizations:     Immunization History   Administered Date(s) Administered    DT (pediatric) 02/07/2001    H1N1, All Formulations 11/21/2009    INFLUENZA 11/18/2008, 10/05/2010, 11/02/2011, 10/26/2017, 09/14/2018    Influenza Quadrivalent, 6-35 Months IM 11/10/2015    Influenza, injectable, quadrivalent, preservative free 0 5 mL 09/14/2018, 10/22/2019    Influenza, seasonal, injectable 11/18/2008, 10/05/2010, 11/02/2011, 10/02/2012, 10/20/2014, 10/26/2017    TD (adult) Preservative Free 02/08/2013    Td (adult), adsorbed 02/08/2013    Tdap 08/21/2014, 07/21/2017      Health Maintenance:         Topic Date Due    Colorectal Cancer Screening  07/23/2022    Cervical Cancer Screening  07/29/2022    Breast Cancer Screening: Mammogram  12/10/2022    Hepatitis C Screening  Completed         Topic Date Due    COVID-19 Vaccine (1) Never done    Pneumococcal Vaccine: 65+ Years (1 - PCV) Never done    Influenza Vaccine (1) 09/01/2022      Medicare Screening Tests and Risk Assessments:     Imani To is here for her Subsequent Wellness visit  Health Risk Assessment:   Patient rates overall health as good  Patient feels that their physical health rating is slightly better  Patient is very satisfied with their life  Eyesight was rated as same  Hearing was rated as same  Patient feels that their emotional and mental health rating is same  Patients states they are sometimes angry  Patient states they are never, rarely unusually tired/fatigued  Pain experienced in the last 7 days has been none  Patient states that she has experienced no weight loss or gain in last 6 months  Fall Risk Screening: In the past year, patient has experienced: no history of falling in past year      Urinary Incontinence Screening:   Patient has leaked urine accidently in the last six months  Home Safety:  Patient does not have trouble with stairs inside or outside of their home  Patient has working smoke alarms and has working carbon monoxide detector  Home safety hazards include: none  Nutrition:   Current diet is Low Carb  Medications:   Patient is currently taking over-the-counter supplements  OTC medications include: C D Bcomplex multi-vit  Patient is able to manage medications       Activities of Daily Living (ADLs)/Instrumental Activities of Daily Living (IADLs):   Walk and transfer into and out of bed and chair?: Yes  Dress and groom yourself?: Yes    Bathe or shower yourself?: Yes    Feed yourself? Yes  Do your laundry/housekeeping?: Yes  Manage your money, pay your bills and track your expenses?: Yes  Make your own meals?: Yes    Do your own shopping?: Yes    Previous Hospitalizations:   Any hospitalizations or ED visits within the last 12 months?: No      Advance Care Planning:   Living will: No    Durable POA for healthcare: No    Advanced directive: No      PREVENTIVE SCREENINGS      Cardiovascular Screening:    General: Screening Not Indicated and History Lipid Disorder      Breast Cancer Screening:     General: Screening Current      Cervical Cancer Screening:    General: Screening Not Indicated      Lung Cancer Screening:     General: Screening Not Indicated      Hepatitis C Screening:    General: Screening Current    Screening, Brief Intervention, and Referral to Treatment (SBIRT)    Screening  Typical number of drinks in a day: 0  Typical number of drinks in a week: 2  Interpretation: Low risk drinking behavior  AUDIT-C Screenin) How often did you have a drink containing alcohol in the past year? 2 to 4 times a month  2) How many drinks did you have on a typical day when you were drinking in the past year? 0  3) How often did you have 6 or more drinks on one occasion in the past year? never    AUDIT-C Score: 2  Interpretation: Score 0-2 (female): Negative screen for alcohol misuse    Single Item Drug Screening:  How often have you used an illegal drug (including marijuana) or a prescription medication for non-medical reasons in the past year? never    Single Item Drug Screen Score: 0  Interpretation: Negative screen for possible drug use disorder    No exam data present     Physical Exam:     Ht 5' 4" (1 626 m)   BMI 29 80 kg/m²     Physical Exam  Vitals and nursing note reviewed  Constitutional:       General: She is not in acute distress  Appearance: She is well-developed  HENT:      Head: Normocephalic and atraumatic     Eyes:      Conjunctiva/sclera: Conjunctivae normal  Cardiovascular:      Rate and Rhythm: Normal rate and regular rhythm  Heart sounds: No murmur heard  Pulmonary:      Effort: Pulmonary effort is normal  No respiratory distress  Breath sounds: Normal breath sounds  Abdominal:      Palpations: Abdomen is soft  Tenderness: There is no abdominal tenderness  Musculoskeletal:      Cervical back: Neck supple  Skin:     General: Skin is warm and dry  Neurological:      Mental Status: She is alert            Vandana Iyer, [address] [place] [address]

## 2022-09-26 NOTE — PATIENT INSTRUCTIONS
Medicare Preventive Visit Patient Instructions  Thank you for completing your Welcome to Medicare Visit or Medicare Annual Wellness Visit today  Your next wellness visit will be due in one year (9/27/2023)  The screening/preventive services that you may require over the next 5-10 years are detailed below  Some tests may not apply to you based off risk factors and/or age  Screening tests ordered at today's visit but not completed yet may show as past due  Also, please note that scanned in results may not display below  Preventive Screenings:  Service Recommendations Previous Testing/Comments   Colorectal Cancer Screening  * Colonoscopy    * Fecal Occult Blood Test (FOBT)/Fecal Immunochemical Test (FIT)  * Fecal DNA/Cologuard Test  * Flexible Sigmoidoscopy Age: 39-70 years old   Colonoscopy: every 10 years (may be performed more frequently if at higher risk)  OR  FOBT/FIT: every 1 year  OR  Cologuard: every 3 years  OR  Sigmoidoscopy: every 5 years  Screening may be recommended earlier than age 39 if at higher risk for colorectal cancer  Also, an individualized decision between you and your healthcare provider will decide whether screening between the ages of 74-80 would be appropriate  Colonoscopy: 06/14/2005  FOBT/FIT: Not on file  Cologuard: Not on file  Sigmoidoscopy: Not on file          Breast Cancer Screening Age: 36 years old  Frequency: every 1-2 years  Not required if history of left and right mastectomy Mammogram: 12/10/2021        Cervical Cancer Screening Between the ages of 21-29, pap smear recommended once every 3 years  Between the ages of 33-67, can perform pap smear with HPV co-testing every 5 years     Recommendations may differ for women with a history of total hysterectomy, cervical cancer, or abnormal pap smears in past  Pap Smear: 07/29/2019        Hepatitis C Screening Once for adults born between 1945 and 1965  More frequently in patients at high risk for Hepatitis C Hep C Antibody: 07/21/2021        Diabetes Screening 1-2 times per year if you're at risk for diabetes or have pre-diabetes Fasting glucose: 111 mg/dL (7/26/2019)  A1C: 5 5 (8/17/2020)      Cholesterol Screening Once every 5 years if you don't have a lipid disorder  May order more often based on risk factors  Lipid panel: 07/26/2019          Other Preventive Screenings Covered by Medicare:  Abdominal Aortic Aneurysm (AAA) Screening: covered once if your at risk  You're considered to be at risk if you have a family history of AAA  Lung Cancer Screening: covers low dose CT scan once per year if you meet all of the following conditions: (1) Age 50-69; (2) No signs or symptoms of lung cancer; (3) Current smoker or have quit smoking within the last 15 years; (4) You have a tobacco smoking history of at least 20 pack years (packs per day multiplied by number of years you smoked); (5) You get a written order from a healthcare provider  Glaucoma Screening: covered annually if you're considered high risk: (1) You have diabetes OR (2) Family history of glaucoma OR (3)  aged 48 and older OR (3)  American aged 72 and older  Osteoporosis Screening: covered every 2 years if you meet one of the following conditions: (1) You're estrogen deficient and at risk for osteoporosis based off medical history and other findings; (2) Have a vertebral abnormality; (3) On glucocorticoid therapy for more than 3 months; (4) Have primary hyperparathyroidism; (5) On osteoporosis medications and need to assess response to drug therapy  Last bone density test (DXA Scan): Not on file  HIV Screening: covered annually if you're between the age of 12-76  Also covered annually if you are younger than 13 and older than 72 with risk factors for HIV infection  For pregnant patients, it is covered up to 3 times per pregnancy      Immunizations:  Immunization Recommendations   Influenza Vaccine Annual influenza vaccination during flu season is recommended for all persons aged >= 6 months who do not have contraindications   Pneumococcal Vaccine   * Pneumococcal conjugate vaccine = PCV13 (Prevnar 13), PCV15 (Vaxneuvance), PCV20 (Prevnar 20)  * Pneumococcal polysaccharide vaccine = PPSV23 (Pneumovax) Adults 2364 years old: 1-3 doses may be recommended based on certain risk factors  Adults 72 years old: 1-2 doses may be recommended based off what pneumonia vaccine you previously received   Hepatitis B Vaccine 3 dose series if at intermediate or high risk (ex: diabetes, end stage renal disease, liver disease)   Tetanus (Td) Vaccine - COST NOT COVERED BY MEDICARE PART B Following completion of primary series, a booster dose should be given every 10 years to maintain immunity against tetanus  Td may also be given as tetanus wound prophylaxis  Tdap Vaccine - COST NOT COVERED BY MEDICARE PART B Recommended at least once for all adults  For pregnant patients, recommended with each pregnancy  Shingles Vaccine (Shingrix) - COST NOT COVERED BY MEDICARE PART B  2 shot series recommended in those aged 48 and above     Health Maintenance Due:      Topic Date Due    Colorectal Cancer Screening  07/23/2022    Cervical Cancer Screening  07/29/2022    Breast Cancer Screening: Mammogram  12/10/2022    Hepatitis C Screening  Completed     Immunizations Due:      Topic Date Due    COVID-19 Vaccine (1) Never done    Pneumococcal Vaccine: 65+ Years (1 - PCV) Never done    Influenza Vaccine (1) 09/01/2022     Advance Directives   What are advance directives? Advance directives are legal documents that state your wishes and plans for medical care  These plans are made ahead of time in case you lose your ability to make decisions for yourself  Advance directives can apply to any medical decision, such as the treatments you want, and if you want to donate organs  What are the types of advance directives?   There are many types of advance directives, and each state has rules about how to use them  You may choose a combination of any of the following:  Living will: This is a written record of the treatment you want  You can also choose which treatments you do not want, which to limit, and which to stop at a certain time  This includes surgery, medicine, IV fluid, and tube feedings  Durable power of  for healthcare [facility]): This is a written record that states who you want to make healthcare choices for you when you are unable to make them for yourself  This person, called a proxy, is usually a family member or a friend  You may choose more than 1 proxy  Do not resuscitate (DNR) order:  A DNR order is used in case your heart stops beating or you stop breathing  It is a request not to have certain forms of treatment, such as CPR  A DNR order may be included in other types of advance directives  Medical directive: This covers the care that you want if you are in a coma, near death, or unable to make decisions for yourself  You can list the treatments you want for each condition  Treatment may include pain medicine, surgery, blood transfusions, dialysis, IV or tube feedings, and a ventilator (breathing machine)  Values history: This document has questions about your views, beliefs, and how you feel and think about life  This information can help others choose the care that you would choose  Why are advance directives important? An advance directive helps you control your care  Although spoken wishes may be used, it is better to have your wishes written down  Spoken wishes can be misunderstood, or not followed  Treatments may be given even if you do not want them  An advance directive may make it easier for your family to make difficult choices about your care  © Copyright [employer] 2018 Information is for End User's use only and may not be sold, redistributed or otherwise used for commercial purposes   All illustrations and images included in CareNotes® are the copyrighted property of [employer]  or [employer] [facility] wellness completed  Cologuard ordered  Anxiety- Start Escitalopram and follow up in four weeks  Knee pain- Continue Celebrex, follow up with ortho  Allergic rhinitis- Recent wheezing, usually at this time of year  Continue Rescue inhaler as needed  Hypertension- Near goal  Continue present medication  GERD- Stable  Continue Omeprazole  Allergic rhinitis- Some wheezing  Continue using rescue inhaler as needed  Start low dose prednisone x 5 days  Follow up in four weeks, labs prior

## 2022-09-30 DIAGNOSIS — I10 HYPERTENSION, UNSPECIFIED TYPE: ICD-10-CM

## 2022-10-03 RX ORDER — AMLODIPINE BESYLATE AND BENAZEPRIL HYDROCHLORIDE 10; 20 MG/1; MG/1
CAPSULE ORAL
Qty: 90 CAPSULE | Refills: 3 | Status: SHIPPED | OUTPATIENT
Start: 2022-10-03

## 2022-10-07 ENCOUNTER — APPOINTMENT (OUTPATIENT)
Dept: LAB | Facility: CLINIC | Age: 67
End: 2022-10-07
Payer: MEDICARE

## 2022-10-07 DIAGNOSIS — R73.09 ELEVATED GLUCOSE: ICD-10-CM

## 2022-10-07 DIAGNOSIS — E78.5 HYPERLIPIDEMIA, UNSPECIFIED HYPERLIPIDEMIA TYPE: ICD-10-CM

## 2022-10-07 DIAGNOSIS — I10 HYPERTENSION, UNSPECIFIED TYPE: ICD-10-CM

## 2022-10-07 LAB
ALBUMIN SERPL BCP-MCNC: 3.9 G/DL (ref 3.5–5)
ALP SERPL-CCNC: 56 U/L (ref 46–116)
ALT SERPL W P-5'-P-CCNC: 26 U/L (ref 12–78)
ANION GAP SERPL CALCULATED.3IONS-SCNC: 6 MMOL/L (ref 4–13)
AST SERPL W P-5'-P-CCNC: 20 U/L (ref 5–45)
BASOPHILS # BLD AUTO: 0.07 THOUSANDS/ΜL (ref 0–0.1)
BASOPHILS NFR BLD AUTO: 1 % (ref 0–1)
BILIRUB SERPL-MCNC: 0.66 MG/DL (ref 0.2–1)
BUN SERPL-MCNC: 17 MG/DL (ref 5–25)
CALCIUM SERPL-MCNC: 9.6 MG/DL (ref 8.3–10.1)
CHLORIDE SERPL-SCNC: 105 MMOL/L (ref 96–108)
CHOLEST SERPL-MCNC: 263 MG/DL
CO2 SERPL-SCNC: 26 MMOL/L (ref 21–32)
CREAT SERPL-MCNC: 0.89 MG/DL (ref 0.6–1.3)
EOSINOPHIL # BLD AUTO: 0.34 THOUSAND/ΜL (ref 0–0.61)
EOSINOPHIL NFR BLD AUTO: 6 % (ref 0–6)
ERYTHROCYTE [DISTWIDTH] IN BLOOD BY AUTOMATED COUNT: 13.1 % (ref 11.6–15.1)
EST. AVERAGE GLUCOSE BLD GHB EST-MCNC: 114 MG/DL
GFR SERPL CREATININE-BSD FRML MDRD: 67 ML/MIN/1.73SQ M
GLUCOSE P FAST SERPL-MCNC: 105 MG/DL (ref 65–99)
HBA1C MFR BLD: 5.6 %
HCT VFR BLD AUTO: 42.5 % (ref 34.8–46.1)
HDLC SERPL-MCNC: 88 MG/DL
HGB BLD-MCNC: 13.8 G/DL (ref 11.5–15.4)
IMM GRANULOCYTES # BLD AUTO: 0.02 THOUSAND/UL (ref 0–0.2)
IMM GRANULOCYTES NFR BLD AUTO: 0 % (ref 0–2)
LDLC SERPL CALC-MCNC: 167 MG/DL (ref 0–100)
LYMPHOCYTES # BLD AUTO: 2.29 THOUSANDS/ΜL (ref 0.6–4.47)
LYMPHOCYTES NFR BLD AUTO: 38 % (ref 14–44)
MCH RBC QN AUTO: 30.2 PG (ref 26.8–34.3)
MCHC RBC AUTO-ENTMCNC: 32.5 G/DL (ref 31.4–37.4)
MCV RBC AUTO: 93 FL (ref 82–98)
MONOCYTES # BLD AUTO: 0.6 THOUSAND/ΜL (ref 0.17–1.22)
MONOCYTES NFR BLD AUTO: 10 % (ref 4–12)
NEUTROPHILS # BLD AUTO: 2.77 THOUSANDS/ΜL (ref 1.85–7.62)
NEUTS SEG NFR BLD AUTO: 45 % (ref 43–75)
NONHDLC SERPL-MCNC: 175 MG/DL
NRBC BLD AUTO-RTO: 0 /100 WBCS
PLATELET # BLD AUTO: 254 THOUSANDS/UL (ref 149–390)
PMV BLD AUTO: 11.2 FL (ref 8.9–12.7)
POTASSIUM SERPL-SCNC: 4.4 MMOL/L (ref 3.5–5.3)
PROT SERPL-MCNC: 7.9 G/DL (ref 6.4–8.4)
RBC # BLD AUTO: 4.57 MILLION/UL (ref 3.81–5.12)
SODIUM SERPL-SCNC: 137 MMOL/L (ref 135–147)
TRIGL SERPL-MCNC: 41 MG/DL
TSH SERPL DL<=0.05 MIU/L-ACNC: 1.49 UIU/ML (ref 0.45–4.5)
WBC # BLD AUTO: 6.09 THOUSAND/UL (ref 4.31–10.16)

## 2022-10-07 PROCEDURE — 83036 HEMOGLOBIN GLYCOSYLATED A1C: CPT

## 2022-10-07 PROCEDURE — 36415 COLL VENOUS BLD VENIPUNCTURE: CPT

## 2022-10-07 PROCEDURE — 84443 ASSAY THYROID STIM HORMONE: CPT

## 2022-10-07 PROCEDURE — 85025 COMPLETE CBC W/AUTO DIFF WBC: CPT

## 2022-10-07 PROCEDURE — 80053 COMPREHEN METABOLIC PANEL: CPT

## 2022-10-07 PROCEDURE — 80061 LIPID PANEL: CPT

## 2022-10-12 PROBLEM — Z00.00 MEDICARE ANNUAL WELLNESS VISIT, SUBSEQUENT: Status: RESOLVED | Noted: 2021-07-21 | Resolved: 2022-10-12

## 2022-10-18 DIAGNOSIS — F41.9 ANXIETY DISORDER, UNSPECIFIED TYPE: ICD-10-CM

## 2022-10-19 ENCOUNTER — RA CDI HCC (OUTPATIENT)
Dept: OTHER | Facility: HOSPITAL | Age: 67
End: 2022-10-19

## 2022-10-19 RX ORDER — ESCITALOPRAM OXALATE 5 MG/1
5 TABLET ORAL DAILY
Qty: 90 TABLET | Refills: 1 | Status: SHIPPED | OUTPATIENT
Start: 2022-10-19

## 2022-10-19 NOTE — PROGRESS NOTES
[address]  coding opportunities       Chart reviewed, no opportunity found: CHART REVIEWED, NO OPPORTUNITY FOUND        Patients Insurance     Medicare Insurance: Medicare

## 2022-10-26 ENCOUNTER — OFFICE VISIT (OUTPATIENT)
Dept: INTERNAL MEDICINE CLINIC | Facility: CLINIC | Age: 67
End: 2022-10-26
Payer: MEDICARE

## 2022-10-26 VITALS
DIASTOLIC BLOOD PRESSURE: 68 MMHG | HEIGHT: 64 IN | OXYGEN SATURATION: 98 % | BODY MASS INDEX: 30.87 KG/M2 | HEART RATE: 57 BPM | SYSTOLIC BLOOD PRESSURE: 128 MMHG | WEIGHT: 180.8 LBS | RESPIRATION RATE: 16 BRPM

## 2022-10-26 DIAGNOSIS — J30.2 SEASONAL ALLERGIC RHINITIS, UNSPECIFIED TRIGGER: Primary | ICD-10-CM

## 2022-10-26 DIAGNOSIS — F41.9 ANXIETY DISORDER, UNSPECIFIED TYPE: ICD-10-CM

## 2022-10-26 DIAGNOSIS — I10 HYPERTENSION, UNSPECIFIED TYPE: ICD-10-CM

## 2022-10-26 DIAGNOSIS — E78.5 HYPERLIPIDEMIA, UNSPECIFIED HYPERLIPIDEMIA TYPE: ICD-10-CM

## 2022-10-26 PROCEDURE — 99214 OFFICE O/P EST MOD 30 MIN: CPT | Performed by: NURSE PRACTITIONER

## 2022-10-26 RX ORDER — PREDNISONE 10 MG/1
TABLET ORAL
Qty: 30 TABLET | Refills: 0 | Status: SHIPPED | OUTPATIENT
Start: 2022-10-26

## 2022-10-26 NOTE — PATIENT INSTRUCTIONS
Hypertension- At goal  Continue current medication  Hyperlipidemia- LDL elevated 167  Risks and benefits of starting medication discussed, not interested in medication at this time  CT coronary calcium score ordered  Allergic rhinitis and wheezing related to environmental factors- Start tapering doses of prednisone  Continue neti pot daily and albuterol inhaler as needed  Anxiety- Restart escitalopram and follow-up in two weeks

## 2022-10-26 NOTE — PROGRESS NOTES
Assessment/Plan:    1  Hypertension- At goal  Continue current medication  2  Hyperlipidemia- LDL elevated 167  Risks and benefits of starting medication discussed, not interested in medication at this time  CT coronary calcium score ordered  3  Allergic rhinitis and wheezing related to environmental factors- Start tapering doses of prednisone  Continue neti pot daily and albuterol inhaler as needed  4  Anxiety- Restart escitalopram and follow-up in two weeks  Diagnoses and all orders for this visit:    Seasonal allergic rhinitis, unspecified trigger  -     predniSONE 10 mg tablet; Take 4 tabs po QD x 3 days then 3 tabs po QD x 3 days then 2 tabs po QD x 3 days then 1 tab po QD x 3 days then stop    Hypertension, unspecified type    Anxiety disorder, unspecified type    Hyperlipidemia, unspecified hyperlipidemia type  -     CT coronary calcium score; Future        The patient was counseled regarding instructions for management, risk factor reductions, patient and family education,impressions, risks and benefits of treatment options, side effects of medications, importance of compliance with treatment  The treatment plan was reviewed with the patient/guardian and patient/guardian understands and agrees with the treatment plan              Current Outpatient Medications:   •  albuterol (PROVENTIL HFA,VENTOLIN HFA) 90 mcg/act inhaler, Inhale 1 puff every 6 (six) hours as needed for wheezing, Disp: 18 g, Rfl: 5  •  amLODIPine-benazepril (LOTREL) 10-20 MG per capsule, TAKE 1 CAPSULE BY MOUTH EVERY DAY, Disp: 90 capsule, Rfl: 3  •  ascorbic acid (VITAMIN C) 500 mg tablet, Take 500 mg by mouth, Disp: , Rfl:   •  B Complex-Biotin-FA (HM VITAMIN B100 COMPLEX) TABS, Take by mouth, Disp: , Rfl:   •  benzonatate (TESSALON) 200 MG capsule, , Disp: , Rfl:   •  betamethasone valerate (VALISONE) 0 1 % cream, APPLY TO AFFECTED AREA TOPICALLY EVERY DAY, Disp: 30 g, Rfl: 1  •  Calcium Carb-Cholecalciferol 600-800 MG-UNIT TABS, Take 1 tablet by mouth, Disp: , Rfl:   •  celecoxib (CeleBREX) 200 mg capsule, Take 1 capsule (200 mg total) by mouth daily, Disp: 90 capsule, Rfl: 1  •  clobetasol (TEMOVATE) 0 05 % cream, APPLY TOPICALLY TO AFFECTED AREA 2 X PER DAY, ARMS/LEGS FOR UP TO 2 WEEKS THEN 2-3 DAYS/WEEK, Disp: 30 g, Rfl: 5  •  EPINEPHrine (EPIPEN) 0 3 mg/0 3 mL SOAJ, Inject as directed, Disp: , Rfl:   •  escitalopram (LEXAPRO) 5 mg tablet, TAKE 1 TABLET (5 MG TOTAL) BY MOUTH DAILY  , Disp: 90 tablet, Rfl: 1  •  ESTRACE VAGINAL 0 1 MG/GM vaginal cream, ONE GRAM PER VAGINA TWICE WEEKLY, Disp: 42 5 g, Rfl: 0  •  LORazepam (ATIVAN) 1 mg tablet, Take 1 tablet (1 mg total) by mouth 2 (two) times a day, Disp: 15 tablet, Rfl: 0  •  Multiple Vitamin (MULTI-VITAMIN DAILY) TABS, Take by mouth, Disp: , Rfl:   •  omeprazole (PriLOSEC) 20 mg delayed release capsule, Take 1 capsule (20 mg total) by mouth daily, Disp: 90 capsule, Rfl: 3  •  predniSONE 10 mg tablet, Take 4 tabs po QD x 3 days then 3 tabs po QD x 3 days then 2 tabs po QD x 3 days then 1 tab po QD x 3 days then stop, Disp: 30 tablet, Rfl: 0  •  Turmeric 500 MG CAPS, Take by mouth, Disp: , Rfl:   •  valACYclovir (VALTREX) 500 mg tablet, Take 1 tablet (500 mg total) by mouth daily, Disp: 90 tablet, Rfl: 3  •  fluticasone (FLONASE) 50 mcg/act nasal spray, into each nostril (Patient not taking: No sig reported), Disp: , Rfl:   •  neomycin-colistin-hydrocortisone-thonzonium (CORTISPORIN TC) 0 33-0 3-1-0 05 % otic suspension, Administer into ears (Patient not taking: No sig reported), Disp: , Rfl:     Subjective:      Patient ID: Oriana Wasserman is a 79 y o  female  Still wheezing after taking low dose Prednisone  She works as a  and has seasonal allergies that exacerbate symptoms  It is worse when laying down in bed  A month ago she was on prednisone for a few days which helped a little  She is taking her albuterol inhaler as needed and using neti pot daily which both help   She has not been taking escitalopram, but will start it  She is feeling anxious at times  The following portions of the patient's history were reviewed and updated as appropriate:   She has a past medical history of Abnormal mammogram, Allergic, Arthritis, Chest pain, Chronic maxillary sinusitis, COPD (chronic obstructive pulmonary disease) ([address] ), Fatty liver, GERD (gastroesophageal reflux disease), Hypertension, Lichen planus, Lyme disease, Raynaud's disease, and Tick bites  ,  does not have any pertinent problems on file  ,   has a past surgical history that includes Eye surgery (Left) and Abdominal surgery  ,  family history includes Breast cancer in her maternal aunt; Cancer in her father and maternal aunt; Coronary artery disease in her father; Lung cancer in her father  ,   reports that she has never smoked  She has never used smokeless tobacco  She reports current alcohol use of about 2 0 standard drinks of alcohol per week  She reports that she does not use drugs  ,  is allergic to bee venom       Review of Systems   Constitutional: Negative  HENT: Positive for rhinorrhea  Respiratory: Positive for wheezing  Cardiovascular: Negative  Musculoskeletal: Negative  Allergic/Immunologic: Positive for environmental allergies  Psychiatric/Behavioral: The patient is nervous/anxious            Objective:  /68   Pulse 57   Resp 16   Ht 5' 4" (1 626 m)   Wt 82 kg (180 lb 12 8 oz)   SpO2 98%   BMI 31 03 kg/m²     Lab Review  Appointment on 10/07/2022   Component Date Value   • WBC 10/07/2022 6 09    • RBC 10/07/2022 4 57    • Hemoglobin 10/07/2022 13 8    • Hematocrit 10/07/2022 42 5    • MCV 10/07/2022 93    • MCH 10/07/2022 30 2    • MCHC 10/07/2022 32 5    • RDW 10/07/2022 13 1    • MPV 10/07/2022 11 2    • Platelets 51/24/4073 254    • nRBC 10/07/2022 0    • Neutrophils Relative 10/07/2022 45    • Immat GRANS % 10/07/2022 0    • Lymphocytes Relative 10/07/2022 38    • Monocytes Relative 10/07/2022 10    • Eosinophils Relative 10/07/2022 6    • Basophils Relative 10/07/2022 1    • Neutrophils Absolute 10/07/2022 2 77    • Immature Grans Absolute 10/07/2022 0 02    • Lymphocytes Absolute 10/07/2022 2 29    • Monocytes Absolute 10/07/2022 0 60    • Eosinophils Absolute 10/07/2022 0 34    • Basophils Absolute 10/07/2022 0 07    • Sodium 10/07/2022 137    • Potassium 10/07/2022 4 4    • Chloride 10/07/2022 105    • CO2 10/07/2022 26    • ANION GAP 10/07/2022 6    • BUN 10/07/2022 17    • Creatinine 10/07/2022 0 89    • Glucose, Fasting 10/07/2022 105 (A)   • Calcium 10/07/2022 9 6    • AST 10/07/2022 20    • ALT 10/07/2022 26    • Alkaline Phosphatase 10/07/2022 56    • Total Protein 10/07/2022 7 9    • Albumin 10/07/2022 3 9    • Total Bilirubin 10/07/2022 0 66    • eGFR 10/07/2022 67    • Hemoglobin A1C 10/07/2022 5 6    • EAG 10/07/2022 114    • Cholesterol 10/07/2022 263 (A)   • Triglycerides 10/07/2022 41    • HDL, Direct 10/07/2022 88    • LDL Calculated 10/07/2022 167 (A)   • Non-HDL-Chol (CHOL-HDL) 10/07/2022 175    • TSH 3RD GENERATON 10/07/2022 1 490         Imaging: No results found  Physical Exam  Constitutional:       Appearance: She is well-developed  Cardiovascular:      Rate and Rhythm: Normal rate and regular rhythm  Heart sounds: Normal heart sounds  Pulmonary:      Effort: Pulmonary effort is normal       Breath sounds: Wheezing present  Musculoskeletal:         General: Normal range of motion  Neurological:      Mental Status: She is alert and oriented to person, place, and time  Deep Tendon Reflexes: Reflexes are normal and symmetric  Psychiatric:         Behavior: Behavior normal          Thought Content:  Thought content normal          Judgment: Judgment normal

## 2022-11-16 ENCOUNTER — HOSPITAL ENCOUNTER (OUTPATIENT)
Dept: CT IMAGING | Facility: HOSPITAL | Age: 67
Discharge: HOME | End: 2022-11-16

## 2022-11-16 DIAGNOSIS — E78.5 HYPERLIPIDEMIA, UNSPECIFIED HYPERLIPIDEMIA TYPE: ICD-10-CM

## 2022-11-25 PROBLEM — Z12.11 SCREENING FOR MALIGNANT NEOPLASM OF COLON: Status: RESOLVED | Noted: 2022-09-26 | Resolved: 2022-11-25

## 2022-12-05 ENCOUNTER — TELEPHONE (OUTPATIENT)
Dept: ADMINISTRATIVE | Facility: OTHER | Age: 67
End: 2022-12-05

## 2022-12-05 NOTE — TELEPHONE ENCOUNTER
----- Message from Micheal Goodwin sent at 12/5/2022  7:36 AM EST -----  Regarding: mammo  12/05/22 7:36 AM    Hello, our patient attached above has had Mammogram completed/performed  Please assist in updating the patient chart by pulling the Care Everywhere (CE) document  The date of service is 12/2/22       Thank you,  Junior Max [facility]

## 2022-12-05 NOTE — TELEPHONE ENCOUNTER
Upon review of the In Basket request we were able to locate, review, and update the patient chart as requested for Mammogram     Any additional questions or concerns should be emailed to the Practice Liaisons via the appropriate education email address, please do not reply via In Basket      Thank you  Versa Tom

## 2022-12-30 ENCOUNTER — OFFICE VISIT (OUTPATIENT)
Dept: URGENT CARE | Facility: CLINIC | Age: 67
End: 2022-12-30

## 2022-12-30 VITALS
HEART RATE: 64 BPM | SYSTOLIC BLOOD PRESSURE: 160 MMHG | TEMPERATURE: 97.8 F | DIASTOLIC BLOOD PRESSURE: 99 MMHG | OXYGEN SATURATION: 97 %

## 2022-12-30 DIAGNOSIS — J20.9 ACUTE BRONCHITIS, UNSPECIFIED ORGANISM: Primary | ICD-10-CM

## 2022-12-30 RX ORDER — BENZONATATE 100 MG/1
100 CAPSULE ORAL 3 TIMES DAILY PRN
Qty: 20 CAPSULE | Refills: 0 | Status: SHIPPED | OUTPATIENT
Start: 2022-12-30

## 2022-12-30 RX ORDER — ALBUTEROL SULFATE 90 UG/1
2 AEROSOL, METERED RESPIRATORY (INHALATION) EVERY 6 HOURS PRN
Qty: 8.5 G | Refills: 0 | Status: SHIPPED | OUTPATIENT
Start: 2022-12-30

## 2022-12-30 RX ORDER — METHYLPREDNISOLONE 4 MG/1
TABLET ORAL
Qty: 21 TABLET | Refills: 0 | Status: SHIPPED | OUTPATIENT
Start: 2022-12-30

## 2022-12-30 RX ORDER — AZITHROMYCIN 250 MG/1
TABLET, FILM COATED ORAL
Qty: 6 TABLET | Refills: 0 | Status: SHIPPED | OUTPATIENT
Start: 2022-12-30 | End: 2023-01-03

## 2022-12-30 NOTE — PATIENT INSTRUCTIONS
Hydration and rest  Start azithromycin and steroids as prescribed  Tessalon perles as needed for cough  Albuterol inhaler as needed for shortness of breath or wheezing  Follow up with Primary Care Physician  Return to clinic or go to the nearest Emergency Department with new or worsening symptoms as discussed  Acute Bronchitis   WHAT YOU NEED TO KNOW:   Acute bronchitis is swelling and irritation in your lungs  It is usually caused by a virus and most often happens in the winter  Bronchitis may also be caused by bacteria or by a chemical irritant, such as smoke  DISCHARGE INSTRUCTIONS:   Return to the emergency department if:   You cough up blood  Your lips or fingernails turn blue  You feel like you are not getting enough air when you breathe  Call your doctor if:   Your symptoms do not go away or get worse, even after treatment  Your cough does not get better within 4 weeks  You have questions or concerns about your condition or care  Medicines: You may  need any of the following:  Cough suppressants  decrease your urge to cough  Decongestants  help loosen mucus in your lungs and make it easier to cough up  This can help you breathe easier  Inhalers  may be given  Your healthcare provider may give you one or more inhalers to help you breathe easier and cough less  An inhaler gives your medicine to open your airways  Ask your healthcare provider to show you how to use your inhaler correctly  Antibiotics  may be given for up to 5 days if your bronchitis is caused by bacteria  Acetaminophen  decreases pain and fever  It is available without a doctor's order  Ask how much to take and how often to take it  Follow directions  Read the labels of all other medicines you are using to see if they also contain acetaminophen, or ask your doctor or pharmacist  Acetaminophen can cause liver damage if not taken correctly   Do not use more than 4 grams (4,000 milligrams) total of acetaminophen in one day  NSAIDs  help decrease swelling and pain or fever  This medicine is available with or without a doctor's order  NSAIDs can cause stomach bleeding or kidney problems in certain people  If you take blood thinner medicine, always ask your healthcare provider if NSAIDs are safe for you  Always read the medicine label and follow directions  Take your medicine as directed  Contact your healthcare provider if you think your medicine is not helping or if you have side effects  Tell him of her if you are allergic to any medicine  Keep a list of the medicines, vitamins, and herbs you take  Include the amounts, and when and why you take them  Bring the list or the pill bottles to follow-up visits  Carry your medicine list with you in case of an emergency  Self-care:   Drink liquids as directed  You may need to drink more liquids than usual to stay hydrated  Ask how much liquid to drink each day and which liquids are best for you  Use a cool mist humidifier  to increase air moisture in your home  This may make it easier for you to breathe and help decrease your cough  Get more rest   Rest helps your body to heal  Slowly start to do more each day  Rest when you feel it is needed  Avoid irritants in the air  Avoid chemicals, fumes, and dust  Wear a face mask if you must work around dust or fumes  Stay inside on days when air pollution levels are high  If you have allergies, stay inside when pollen counts are high  Do not use aerosol products, such as spray-on deodorant, bug spray, and hair spray  Do not smoke or be around others who are smoking  Nicotine and other chemicals in cigarettes and cigars can cause lung damage  Ask your healthcare provider for information if you currently smoke and need help to quit  E-cigarettes or smokeless tobacco still contain nicotine  Talk to your healthcare provider before you use these products       Prevent acute bronchitis: Ask about vaccines you may need  Get a flu vaccine each year as soon as recommended, usually in September or October  Ask your healthcare provider if you should also get a pneumonia or COVID-19 vaccine  Your healthcare provider can tell you if you should also get other vaccines, and when to get them  Prevent the spread of germs  You can decrease your risk for acute bronchitis and other illnesses by doing the following:      Wash your hands often with soap and water  Carry germ-killing hand lotion or gel with you  You can use the lotion or gel to clean your hands when soap and water are not available  Do not touch your eyes, nose, or mouth unless you have washed your hands first      Always cover your mouth when you cough to prevent the spread of germs  It is best to cough into a tissue or your shirt sleeve instead of into your hand  Ask those around you to cover their mouths when they cough  Try to avoid people who have a cold or the flu  If you are sick, stay away from others as much as possible  Follow up with your doctor as directed:  Write down questions you have so you will remember to ask them during your follow-up visits  © Copyright [employer] 2021 Information is for End User's use only and may not be sold, redistributed or otherwise used for commercial purposes  All illustrations and images included in CareNotes® are the copyrighted property of [employer]  or Piotr Cooper  The above information is an  only  It is not intended as medical advice for individual conditions or treatments  Talk to your doctor, nurse or pharmacist before following any medical regimen to see if it is safe and effective for you  **Portions of the record may have been created with voice recognition software  Occasional wrong word or "sound a like" substitutions may have occurred due to the inherent limitations of voice recognition software    Read the chart carefully and recognize, using context, where substitutions have occurred  **

## 2022-12-30 NOTE — PROGRESS NOTES
[facility]        NAME: Marly Mason is a 79 y o  female  : 1955    MRN: [id number]  DATE: 2022  TIME: 9:55 AM    Assessment and Plan   Acute bronchitis, unspecified organism [J20 [id number]]  1  Acute bronchitis, unspecified organism  benzonatate (TESSALON PERLES) 100 mg capsule    albuterol (ProAir HFA) 90 mcg/act inhaler    methylPREDNISolone 4 MG tablet therapy pack    azithromycin (ZITHROMAX) 250 mg tablet            Patient Instructions     Patient Instructions     Hydration and rest  Start azithromycin and steroids as prescribed  Tessalon perles as needed for cough  Albuterol inhaler as needed for shortness of breath or wheezing  Follow up with Primary Care Physician  Return to clinic or go to the nearest Emergency Department with new or worsening symptoms as discussed  Acute Bronchitis   WHAT YOU NEED TO KNOW:   Acute bronchitis is swelling and irritation in your lungs  It is usually caused by a virus and most often happens in the winter  Bronchitis may also be caused by bacteria or by a chemical irritant, such as smoke  DISCHARGE INSTRUCTIONS:   Return to the emergency department if:   · You cough up blood  · Your lips or fingernails turn blue  · You feel like you are not getting enough air when you breathe  Call your doctor if:   · Your symptoms do not go away or get worse, even after treatment  · Your cough does not get better within 4 weeks  · You have questions or concerns about your condition or care  Medicines: You may  need any of the following:  · Cough suppressants  decrease your urge to cough  · Decongestants  help loosen mucus in your lungs and make it easier to cough up  This can help you breathe easier  · Inhalers  may be given  Your healthcare provider may give you one or more inhalers to help you breathe easier and cough less  An inhaler gives your medicine to open your airways   Ask your healthcare provider to show you how to use your inhaler correctly  · Antibiotics  may be given for up to 5 days if your bronchitis is caused by bacteria  · Acetaminophen  decreases pain and fever  It is available without a doctor's order  Ask how much to take and how often to take it  Follow directions  Read the labels of all other medicines you are using to see if they also contain acetaminophen, or ask your doctor or pharmacist  Acetaminophen can cause liver damage if not taken correctly  Do not use more than 4 grams (4,000 milligrams) total of acetaminophen in one day  · NSAIDs  help decrease swelling and pain or fever  This medicine is available with or without a doctor's order  NSAIDs can cause stomach bleeding or kidney problems in certain people  If you take blood thinner medicine, always ask your healthcare provider if NSAIDs are safe for you  Always read the medicine label and follow directions  · Take your medicine as directed  Contact your healthcare provider if you think your medicine is not helping or if you have side effects  Tell him of her if you are allergic to any medicine  Keep a list of the medicines, vitamins, and herbs you take  Include the amounts, and when and why you take them  Bring the list or the pill bottles to follow-up visits  Carry your medicine list with you in case of an emergency  Self-care:   · Drink liquids as directed  You may need to drink more liquids than usual to stay hydrated  Ask how much liquid to drink each day and which liquids are best for you  · Use a cool mist humidifier  to increase air moisture in your home  This may make it easier for you to breathe and help decrease your cough  · Get more rest   Rest helps your body to heal  Slowly start to do more each day  Rest when you feel it is needed  · Avoid irritants in the air  Avoid chemicals, fumes, and dust  Wear a face mask if you must work around dust or fumes   Stay inside on days when air pollution levels are high  If you have allergies, stay inside when pollen counts are high  Do not use aerosol products, such as spray-on deodorant, bug spray, and hair spray  · Do not smoke or be around others who are smoking  Nicotine and other chemicals in cigarettes and cigars can cause lung damage  Ask your healthcare provider for information if you currently smoke and need help to quit  E-cigarettes or smokeless tobacco still contain nicotine  Talk to your healthcare provider before you use these products  Prevent acute bronchitis:       1  Ask about vaccines you may need  Get a flu vaccine each year as soon as recommended, usually in September or October  Ask your healthcare provider if you should also get a pneumonia or COVID-19 vaccine  Your healthcare provider can tell you if you should also get other vaccines, and when to get them  2  Prevent the spread of germs  You can decrease your risk for acute bronchitis and other illnesses by doing the following:      ? Wash your hands often with soap and water  Carry germ-killing hand lotion or gel with you  You can use the lotion or gel to clean your hands when soap and water are not available  ? Do not touch your eyes, nose, or mouth unless you have washed your hands first      ? Always cover your mouth when you cough to prevent the spread of germs  It is best to cough into a tissue or your shirt sleeve instead of into your hand  Ask those around you to cover their mouths when they cough  ? Try to avoid people who have a cold or the flu  If you are sick, stay away from others as much as possible  Follow up with your doctor as directed:  Write down questions you have so you will remember to ask them during your follow-up visits  © Copyright [employer] 2021 Information is for End User's use only and may not be sold, redistributed or otherwise used for commercial purposes   All illustrations and images included in CareNotes® are the copyrighted property of [employer]  or [address] [place] [address]  The above information is an  only  It is not intended as medical advice for individual conditions or treatments  Talk to your doctor, nurse or pharmacist before following any medical regimen to see if it is safe and effective for you  **Portions of the record may have been created with voice recognition software  Occasional wrong word or "sound a like" substitutions may have occurred due to the inherent limitations of voice recognition software  Read the chart carefully and recognize, using context, where substitutions have occurred  **            Chief Complaint     Chief Complaint   Patient presents with   • Cough     Pt c/o cough for 1 week with phlegm in back of throat unable to expel  Also states has a sinus h/a  History of Present Illness     Benjamin Moscoso is a 79 y o  female presenting to the office today for cough and congestion  Symptoms have been present for 1 1/2 weeks, and include cough, wheezing, post nasal drainage, headaches, and congestion  Denies fevers, chills, SOB, chest pain, ear pain, sore throat, N/V/D  She has tried albuterol inhaler, mucinex,  for her symptoms, without relief  Sick contacts include: none    Review of Systems     Review of Systems   Constitutional: Negative for chills and fever  HENT: Positive for congestion and postnasal drip  Negative for ear pain, sinus pressure, sinus pain and sore throat  Respiratory: Positive for cough and wheezing  Negative for shortness of breath  Cardiovascular: Negative for chest pain and palpitations  Gastrointestinal: Negative for abdominal pain, diarrhea, nausea and vomiting  Musculoskeletal: Negative for arthralgias and myalgias  Skin: Negative for color change and rash  Neurological: Positive for headaches         Current Medications       Current Outpatient Medications:   •  albuterol (ProAir HFA) 90 mcg/act inhaler, Inhale 2 puffs every 6 (six) hours as needed for wheezing, Disp: 8 5 g, Rfl: 0  •  amLODIPine-benazepril (LOTREL) 10-20 MG per capsule, TAKE 1 CAPSULE BY MOUTH EVERY DAY, Disp: 90 capsule, Rfl: 3  •  ascorbic acid (VITAMIN C) 500 mg tablet, Take 500 mg by mouth, Disp: , Rfl:   •  azithromycin (ZITHROMAX) 250 mg tablet, Take 2 tablets today then 1 tablet daily x 4 days, Disp: 6 tablet, Rfl: 0  •  B Complex-Biotin-FA (HM VITAMIN B100 COMPLEX) TABS, Take by mouth, Disp: , Rfl:   •  benzonatate (TESSALON PERLES) 100 mg capsule, Take 1 capsule (100 mg total) by mouth 3 (three) times a day as needed for cough, Disp: 20 capsule, Rfl: 0  •  Calcium Carb-Cholecalciferol 600-800 MG-UNIT TABS, Take 1 tablet by mouth, Disp: , Rfl:   •  celecoxib (CeleBREX) 200 mg capsule, Take 1 capsule (200 mg total) by mouth daily, Disp: 90 capsule, Rfl: 1  •  clobetasol (TEMOVATE) 0 05 % cream, APPLY TOPICALLY TO AFFECTED AREA 2 X PER DAY, ARMS/LEGS FOR UP TO 2 WEEKS THEN 2-3 DAYS/WEEK, Disp: 30 g, Rfl: 5  •  methylPREDNISolone 4 MG tablet therapy pack, Use as directed on package, Disp: 21 tablet, Rfl: 0  •  Multiple Vitamin (MULTI-VITAMIN DAILY) TABS, Take by mouth, Disp: , Rfl:   •  omeprazole (PriLOSEC) 20 mg delayed release capsule, Take 1 capsule (20 mg total) by mouth daily, Disp: 90 capsule, Rfl: 3  •  Turmeric 500 MG CAPS, Take by mouth, Disp: , Rfl:   •  betamethasone valerate (VALISONE) 0 1 % cream, APPLY TO AFFECTED AREA TOPICALLY EVERY DAY (Patient not taking: Reported on 12/30/2022), Disp: 30 g, Rfl: 1  •  EPINEPHrine (EPIPEN) 0 3 mg/0 3 mL SOAJ, Inject as directed, Disp: , Rfl:   •  escitalopram (LEXAPRO) 5 mg tablet, TAKE 1 TABLET (5 MG TOTAL) BY MOUTH DAILY   (Patient not taking: Reported on 12/30/2022), Disp: 90 tablet, Rfl: 1  •  ESTRACE VAGINAL 0 1 MG/GM vaginal cream, ONE GRAM PER VAGINA TWICE WEEKLY, Disp: 42 5 g, Rfl: 0  •  fluticasone (FLONASE) 50 mcg/act nasal spray, into each nostril (Patient not taking: No sig reported), Disp: , Rfl:   • LORazepam (ATIVAN) 1 mg tablet, Take 1 tablet (1 mg total) by mouth 2 (two) times a day (Patient not taking: Reported on 12/30/2022), Disp: 15 tablet, Rfl: 0  •  neomycin-colistin-hydrocortisone-thonzonium (CORTISPORIN TC) 0 33-0 3-1-0 05 % otic suspension, Administer into ears (Patient not taking: Reported on 9/3/2021), Disp: , Rfl:   •  valACYclovir (VALTREX) 500 mg tablet, Take 1 tablet (500 mg total) by mouth daily, Disp: 90 tablet, Rfl: 3    Current Allergies     Allergies as of 12/30/2022 - Reviewed 12/30/2022   Allergen Reaction Noted   • Bee venom Anaphylaxis 05/03/2009            The following portions of the patient's history were reviewed and updated as appropriate: allergies, current medications, past family history, past medical history, past social history, past surgical history and problem list      Past Medical History:   Diagnosis Date   • Abnormal mammogram     last assessed-8/12/2014   • Allergic    • Arthritis    • Chest pain     last assessed-6/17/2013-non cardiac   • Chronic maxillary sinusitis     last assessed-11/10/2015   • COPD (chronic obstructive pulmonary disease) (HCC)    • Fatty liver     last assessed-6/11/2014   • GERD (gastroesophageal reflux disease)    • Hypertension    • Lichen planus    • Lyme disease     last assessed-6/17/2013   • Raynaud's disease    • Tick bites     last assessed-6/18/2013       Past Surgical History:   Procedure Laterality Date   • ABDOMINAL SURGERY     • EYE SURGERY Left     childhood       Family History   Problem Relation Age of Onset   • Coronary artery disease Father    • Cancer Father    • Lung cancer Father    • Cancer Maternal Aunt    • Breast cancer Maternal Aunt        Medications have been verified  Objective     /99   Pulse 64   Temp 97 8 °F (36 6 °C)   SpO2 97%   No LMP recorded  Patient is postmenopausal      Physical Exam     Physical Exam  Vitals and nursing note reviewed     Constitutional:       General: She is not in acute distress  Appearance: Normal appearance  HENT:      Head: Normocephalic and atraumatic  Right Ear: Tympanic membrane and ear canal normal       Left Ear: Tympanic membrane and ear canal normal       Nose: No congestion or rhinorrhea  Right Sinus: No maxillary sinus tenderness or frontal sinus tenderness  Left Sinus: No maxillary sinus tenderness or frontal sinus tenderness  Mouth/Throat:      Mouth: Mucous membranes are moist       Pharynx: Oropharynx is clear  Uvula midline  No posterior oropharyngeal erythema  Tonsils: No tonsillar exudate  0 on the right  0 on the left  Eyes:      Conjunctiva/sclera: Conjunctivae normal    Cardiovascular:      Rate and Rhythm: Normal rate and regular rhythm  Heart sounds: Normal heart sounds  Pulmonary:      Effort: Pulmonary effort is normal       Breath sounds: Examination of the left-upper field reveals wheezing  Wheezing (faint expiratory) present  No rhonchi or rales  Lymphadenopathy:      Cervical: No cervical adenopathy  Skin:     General: Skin is warm and dry  Psychiatric:         Behavior: Behavior normal  Behavior is cooperative

## 2023-01-17 ENCOUNTER — APPOINTMENT (EMERGENCY)
Dept: RADIOLOGY | Facility: HOSPITAL | Age: 68
End: 2023-01-17

## 2023-01-17 ENCOUNTER — HOSPITAL ENCOUNTER (EMERGENCY)
Facility: HOSPITAL | Age: 68
Discharge: HOME | End: 2023-01-17
Attending: EMERGENCY MEDICINE

## 2023-01-17 VITALS
TEMPERATURE: 97.9 F | HEART RATE: 62 BPM | RESPIRATION RATE: 14 BRPM | OXYGEN SATURATION: 100 % | SYSTOLIC BLOOD PRESSURE: 142 MMHG | DIASTOLIC BLOOD PRESSURE: 68 MMHG

## 2023-01-17 DIAGNOSIS — R07.89 CHEST WALL PAIN: Primary | ICD-10-CM

## 2023-01-17 LAB
2HR DELTA HS TROPONIN: 0 NG/L
ANION GAP SERPL CALCULATED.3IONS-SCNC: 10 MMOL/L (ref 4–13)
ATRIAL RATE: 57 BPM
ATRIAL RATE: 75 BPM
BASOPHILS # BLD AUTO: 0.09 THOUSANDS/ÂΜL (ref 0–0.1)
BASOPHILS NFR BLD AUTO: 1 % (ref 0–1)
BUN SERPL-MCNC: 20 MG/DL (ref 5–25)
CALCIUM SERPL-MCNC: 9 MG/DL (ref 8.3–10.1)
CARDIAC TROPONIN I PNL SERPL HS: 6 NG/L
CARDIAC TROPONIN I PNL SERPL HS: 6 NG/L
CHLORIDE SERPL-SCNC: 106 MMOL/L (ref 96–108)
CO2 SERPL-SCNC: 29 MMOL/L (ref 21–32)
CREAT SERPL-MCNC: 1.1 MG/DL (ref 0.6–1.3)
EOSINOPHIL # BLD AUTO: 0.28 THOUSAND/ÂΜL (ref 0–0.61)
EOSINOPHIL NFR BLD AUTO: 4 % (ref 0–6)
ERYTHROCYTE [DISTWIDTH] IN BLOOD BY AUTOMATED COUNT: 13.1 % (ref 11.6–15.1)
GFR SERPL CREATININE-BSD FRML MDRD: 52 ML/MIN/1.73SQ M
GLUCOSE SERPL-MCNC: 93 MG/DL (ref 65–140)
HCT VFR BLD AUTO: 42.2 % (ref 34.8–46.1)
HGB BLD-MCNC: 13.9 G/DL (ref 11.5–15.4)
IMM GRANULOCYTES # BLD AUTO: 0.03 THOUSAND/UL (ref 0–0.2)
IMM GRANULOCYTES NFR BLD AUTO: 0 % (ref 0–2)
LYMPHOCYTES # BLD AUTO: 2.75 THOUSANDS/ÂΜL (ref 0.6–4.47)
LYMPHOCYTES NFR BLD AUTO: 38 % (ref 14–44)
MCH RBC QN AUTO: 30.7 PG (ref 26.8–34.3)
MCHC RBC AUTO-ENTMCNC: 32.9 G/DL (ref 31.4–37.4)
MCV RBC AUTO: 93 FL (ref 82–98)
MONOCYTES # BLD AUTO: 0.6 THOUSAND/ÂΜL (ref 0.17–1.22)
MONOCYTES NFR BLD AUTO: 8 % (ref 4–12)
NEUTROPHILS # BLD AUTO: 3.57 THOUSANDS/ÂΜL (ref 1.85–7.62)
NEUTS SEG NFR BLD AUTO: 49 % (ref 43–75)
NRBC BLD AUTO-RTO: 0 /100 WBCS
P AXIS: 50 DEGREES
P AXIS: 63 DEGREES
PLATELET # BLD AUTO: 233 THOUSANDS/UL (ref 149–390)
PMV BLD AUTO: 10 FL (ref 8.9–12.7)
POTASSIUM SERPL-SCNC: 4.5 MMOL/L (ref 3.5–5.3)
PR INTERVAL: 166 MS
PR INTERVAL: 178 MS
QRS AXIS: 18 DEGREES
QRS AXIS: 23 DEGREES
QRSD INTERVAL: 92 MS
QRSD INTERVAL: 94 MS
QT INTERVAL: 396 MS
QT INTERVAL: 430 MS
QTC INTERVAL: 418 MS
QTC INTERVAL: 442 MS
RBC # BLD AUTO: 4.53 MILLION/UL (ref 3.81–5.12)
SODIUM SERPL-SCNC: 145 MMOL/L (ref 135–147)
T WAVE AXIS: 64 DEGREES
T WAVE AXIS: 68 DEGREES
VENTRICULAR RATE: 57 BPM
VENTRICULAR RATE: 75 BPM
WBC # BLD AUTO: 7.32 THOUSAND/UL (ref 4.31–10.16)

## 2023-01-17 RX ORDER — SODIUM CHLORIDE 9 MG/ML
3 INJECTION INTRAVENOUS
Status: DISCONTINUED | OUTPATIENT
Start: 2023-01-17 | End: 2023-01-17 | Stop reason: HOSPADM

## 2023-01-17 NOTE — ED PROVIDER NOTES
History  Chief Complaint   Patient presents with   • Chest Pain     L sided chest tightness x5 days, associated paraesthesias to the L arm  Denies n/v, diaphoresis  325mg asa at home  71-year-old presents here with a chief complaint of left-sided chest tightness for the last 5 days  She reports occasional paresthesias of the left upper extremity specifically the second and third digits intermittently  She denies nausea vomiting diaphoresis or dizziness  She came to get her heart checked out          Prior to Admission Medications   Prescriptions Last Dose Informant Patient Reported? Taking?    B Complex-Biotin-FA ( VITAMIN B100 COMPLEX) TABS   Yes No   Sig: Take by mouth   Calcium Carb-Cholecalciferol 600-800 MG-UNIT TABS   Yes No   Sig: Take 1 tablet by mouth   EPINEPHrine (EPIPEN) 0 3 mg/0 3 mL SOAJ   Yes No   Sig: Inject as directed   ESTRACE VAGINAL 0 1 MG/GM vaginal cream   No No   Sig: ONE GRAM PER VAGINA TWICE WEEKLY   LORazepam (ATIVAN) 1 mg tablet   No No   Sig: Take 1 tablet (1 mg total) by mouth 2 (two) times a day   Patient not taking: Reported on 12/30/2022   Multiple Vitamin (MULTI-VITAMIN DAILY) TABS   Yes No   Sig: Take by mouth   Turmeric 500 MG CAPS   Yes No   Sig: Take by mouth   albuterol (ProAir HFA) 90 mcg/act inhaler   No No   Sig: Inhale 2 puffs every 6 (six) hours as needed for wheezing   amLODIPine-benazepril (LOTREL) 10-20 MG per capsule   No No   Sig: TAKE 1 CAPSULE BY MOUTH EVERY DAY   ascorbic acid (VITAMIN C) 500 mg tablet   Yes No   Sig: Take 500 mg by mouth   benzonatate (TESSALON PERLES) 100 mg capsule   No No   Sig: Take 1 capsule (100 mg total) by mouth 3 (three) times a day as needed for cough   betamethasone valerate (VALISONE) 0 1 % cream   No No   Sig: APPLY TO AFFECTED AREA TOPICALLY EVERY DAY   Patient not taking: Reported on 12/30/2022   celecoxib (CeleBREX) 200 mg capsule   No No   Sig: Take 1 capsule (200 mg total) by mouth daily   clobetasol (TEMOVATE) 0 05 % cream   No No   Sig: APPLY TOPICALLY TO AFFECTED AREA 2 X PER DAY, ARMS/LEGS FOR UP TO 2 WEEKS THEN 2-3 DAYS/WEEK   escitalopram (LEXAPRO) 5 mg tablet   No No   Sig: TAKE 1 TABLET (5 MG TOTAL) BY MOUTH DAILY  Patient not taking: Reported on 12/30/2022   fluticasone (FLONASE) 50 mcg/act nasal spray   Yes No   Sig: into each nostril   Patient not taking: No sig reported   methylPREDNISolone 4 MG tablet therapy pack   No No   Sig: Use as directed on package   neomycin-colistin-hydrocortisone-thonzonium (CORTISPORIN TC) 0 33-0 3-1-0 05 % otic suspension   Yes No   Sig: Administer into ears   Patient not taking: Reported on 9/3/2021   omeprazole (PriLOSEC) 20 mg delayed release capsule   No No   Sig: Take 1 capsule (20 mg total) by mouth daily   valACYclovir (VALTREX) 500 mg tablet   No No   Sig: Take 1 tablet (500 mg total) by mouth daily      Facility-Administered Medications: None       Past Medical History:   Diagnosis Date   • Abnormal mammogram     last assessed-8/12/2014   • Allergic    • Arthritis    • Chest pain     last assessed-6/17/2013-non cardiac   • Chronic maxillary sinusitis     last assessed-11/10/2015   • COPD (chronic obstructive pulmonary disease) (HCC)    • Fatty liver     last assessed-6/11/2014   • GERD (gastroesophageal reflux disease)    • Hypertension    • Lichen planus    • Lyme disease     last assessed-6/17/2013   • Raynaud's disease    • Tick bites     last assessed-6/18/2013       Past Surgical History:   Procedure Laterality Date   • ABDOMINAL SURGERY     • EYE SURGERY Left     childhood       Family History   Problem Relation Age of Onset   • Coronary artery disease Father    • Cancer Father    • Lung cancer Father    • Cancer Maternal Aunt    • Breast cancer Maternal Aunt      I have reviewed and agree with the history as documented      E-Cigarette/Vaping   • E-Cigarette Use Never User      E-Cigarette/Vaping Substances     Social History     Tobacco Use   • Smoking status: Never   • Smokeless tobacco: Never   Vaping Use   • Vaping Use: Never used   Substance Use Topics   • Alcohol use: Yes     Alcohol/week: 2 0 standard drinks     Types: 2 Glasses of wine per week   • Drug use: No       Review of Systems   Constitutional: Negative for diaphoresis, fatigue and fever  HENT: Negative for congestion, ear pain, nosebleeds and sore throat  Eyes: Negative for photophobia, pain, discharge and visual disturbance  Respiratory: Positive for chest tightness  Negative for cough, choking, shortness of breath and wheezing  Cardiovascular: Negative for chest pain and palpitations  Gastrointestinal: Negative for abdominal distention, abdominal pain, diarrhea and vomiting  Genitourinary: Negative for dysuria, flank pain and frequency  Musculoskeletal: Negative for back pain, gait problem and joint swelling  Skin: Negative for color change and rash  Neurological: Positive for numbness  Negative for dizziness, syncope and headaches  Psychiatric/Behavioral: Negative for behavioral problems and confusion  The patient is not nervous/anxious  All other systems reviewed and are negative  Physical Exam  Physical Exam  Vitals and nursing note reviewed  Constitutional:       General: She is not in acute distress  Appearance: She is well-developed  She is not ill-appearing or toxic-appearing  HENT:      Head: Normocephalic and atraumatic  Mouth/Throat:      Dentition: Normal dentition  Eyes:      General:         Right eye: No discharge  Left eye: No discharge  Cardiovascular:      Rate and Rhythm: Normal rate and regular rhythm  Pulmonary:      Effort: Pulmonary effort is normal  No accessory muscle usage or respiratory distress  Abdominal:      General: There is no distension  Tenderness: There is no guarding  Musculoskeletal:         General: Normal range of motion  Cervical back: Normal range of motion and neck supple     Skin:     General: Skin is warm and dry  Neurological:      Mental Status: She is alert and oriented to person, place, and time  Coordination: Coordination normal    Psychiatric:         Behavior: Behavior is cooperative           Vital Signs  ED Triage Vitals   Temperature Pulse Respirations Blood Pressure SpO2   01/17/23 1136 01/17/23 1136 01/17/23 1136 01/17/23 1136 01/17/23 1136   97 9 °F (36 6 °C) 71 18 (!) 194/90 97 %      Temp src Heart Rate Source Patient Position - Orthostatic VS BP Location FiO2 (%)   -- 01/17/23 1136 01/17/23 1136 01/17/23 1136 --    Monitor Sitting Left arm       Pain Score       01/17/23 1400       No Pain           Vitals:    01/17/23 1245 01/17/23 1300 01/17/23 1400 01/17/23 1500   BP: (!) 184/82 163/70 145/70 142/68   Pulse: (!) 54 (!) 51 (!) 51 62   Patient Position - Orthostatic VS: Sitting Sitting Sitting Sitting         Visual Acuity      ED Medications  Medications   sodium chloride (PF) 0 9 % injection 3 mL (has no administration in time range)       Diagnostic Studies  Results Reviewed     Procedure Component Value Units Date/Time    HS Troponin I 2hr [[id number]]  (Normal) Collected: 01/17/23 1351    Lab Status: Final result Specimen: Blood from Arm, Right Updated: 01/17/23 1427     hs TnI 2hr 6 ng/L      Delta 2hr hsTnI 0 ng/L     HS Troponin I 4hr [[id number]]     Lab Status: No result Specimen: Blood     HS Troponin 0hr (reflex protocol) [[id number]]  (Normal) Collected: 01/17/23 1155    Lab Status: Final result Specimen: Blood from Arm, Right Updated: 01/17/23 1228     hs TnI 0hr 6 ng/L     Basic metabolic panel [[id number]] Collected: 01/17/23 1155    Lab Status: Final result Specimen: Blood from Arm, Right Updated: 01/17/23 1216     Sodium 145 mmol/L      Potassium 4 5 mmol/L      Chloride 106 mmol/L      CO2 29 mmol/L      ANION GAP 10 mmol/L      BUN 20 mg/dL      Creatinine 1 10 mg/dL      Glucose 93 mg/dL      Calcium 9 0 mg/dL      eGFR 52 ml/min/1 73sq m     Narrative:      Consolidated [employer] guidelines for Chronic Kidney Disease (CKD):   •  Stage 1 with normal or high GFR (GFR > 90 mL/min/1 73 square meters)  •  Stage 2 Mild CKD (GFR = 60-89 mL/min/1 73 square meters)  •  Stage 3A Moderate CKD (GFR = 45-59 mL/min/1 73 square meters)  •  Stage 3B Moderate CKD (GFR = 30-44 mL/min/1 73 square meters)  •  Stage 4 Severe CKD (GFR = 15-29 mL/min/1 73 square meters)  •  Stage 5 End Stage CKD (GFR <15 mL/min/1 73 square meters)  Note: GFR calculation is accurate only with a steady state creatinine    CBC and differential [[id number]] Collected: 01/17/23 1155    Lab Status: Final result Specimen: Blood from Arm, Right Updated: 01/17/23 1212     WBC 7 32 Thousand/uL      RBC 4 53 Million/uL      Hemoglobin 13 9 g/dL      Hematocrit 42 2 %      MCV 93 fL      MCH 30 7 pg      MCHC 32 9 g/dL      RDW 13 1 %      MPV 10 0 fL      Platelets 341 Thousands/uL      nRBC 0 /100 WBCs      Neutrophils Relative 49 %      Immat GRANS % 0 %      Lymphocytes Relative 38 %      Monocytes Relative 8 %      Eosinophils Relative 4 %      Basophils Relative 1 %      Neutrophils Absolute 3 57 Thousands/µL      Immature Grans Absolute 0 03 Thousand/uL      Lymphocytes Absolute 2 75 Thousands/µL      Monocytes Absolute 0 60 Thousand/µL      Eosinophils Absolute 0 28 Thousand/µL      Basophils Absolute 0 09 Thousands/µL                  X-ray chest 1 view portable   Final Result by Remington Berrios MD (01/17 1250)      No acute cardiopulmonary disease                    Workstation performed: [id number]                    Procedures  ECG 12 Lead Documentation Only    Date/Time: 1/17/2023 11:39 AM  Performed by: ALAINA Ortiz  Authorized by: ALAINA Ortiz     Indications / Diagnosis:  Chest pain  ECG reviewed by me, the ED Provider: yes    Patient location:  ED  Previous ECG:     Previous ECG:  Compared to current    Similarity:  No change  Interpretation:     Interpretation: normal    Rate:     ECG rate assessment: normal    Rhythm:     Rhythm: sinus rhythm    Ectopy:     Ectopy: none    QRS:     QRS axis:  Normal  Conduction:     Conduction: normal    ST segments:     ST segments:  Normal  T waves:     T waves: normal               ED Course             HEART Risk Score    Flowsheet Row Most Recent Value   Heart Score Risk Calculator    History 0 Filed at: 01/17/2023 1525   ECG 0 Filed at: 01/17/2023 1525   Age 2 Filed at: 01/17/2023 1525   Risk Factors 1 Filed at: 01/17/2023 1525   Troponin 0 Filed at: 01/17/2023 1525   HEART Score 3 Filed at: 01/17/2023 1525                        SBIRT 20yo+    Flowsheet Row Most Recent Value   SBIRT (25 yo +)    In order to provide better care to our patients, we are screening all of our patients for alcohol and drug use  Would it be okay to ask you these screening questions? Yes Filed at: 01/17/2023 1147   Initial Alcohol Screen: US AUDIT-C     1  How often do you have a drink containing alcohol? 0 Filed at: 01/17/2023 1147   2  How many drinks containing alcohol do you have on a typical day you are drinking? 0 Filed at: 01/17/2023 1147   3b  FEMALE Any Age, or MALE 65+: How often do you have 4 or more drinks on one occassion? 0 Filed at: 01/17/2023 1147   Audit-C Score 0 Filed at: 01/17/2023 1147   MIGUELANGEL: How many times in the past year have you    Used an illegal drug or used a prescription medication for non-medical reasons? Never Filed at: 01/17/2023 1147                    Medical Decision Making  Work-up essentially negative for acute coronary syndrome  No EKG changes  Low heart score  Okay for discharge home    Chest wall pain: acute illness or injury  Amount and/or Complexity of Data Reviewed  Labs: ordered  Radiology: ordered  Risk  Prescription drug management            Disposition  Final diagnoses:   Chest wall pain     Time reflects when diagnosis was documented in both MDM as applicable and the Disposition within this note     Time User Action Codes Description Comment    1/17/2023  2:57 PM Gianna Carmona Add [R07 89] Chest wall pain       ED Disposition     ED Disposition   Discharge    Condition   Stable    Date/Time   Tue Jan 17, 2023  2:57 PM    Comment   Sandie Louise discharge to home/self care  Follow-up Information     Follow up With Specialties Details Why Contact Info Additional Information    [facility] Emergency Department Emergency Medicine   [address] Emergency Department, [address] [place] [address], [id number]          Discharge Medication List as of 1/17/2023  2:58 PM      CONTINUE these medications which have NOT CHANGED    Details   albuterol (ProAir HFA) 90 mcg/act inhaler Inhale 2 puffs every 6 (six) hours as needed for wheezing, Starting Fri 12/30/2022, Normal      amLODIPine-benazepril (LOTREL) 10-20 MG per capsule TAKE 1 CAPSULE BY MOUTH EVERY DAY, Normal      ascorbic acid (VITAMIN C) 500 mg tablet Take 500 mg by mouth, Historical Med      B Complex-Biotin-FA ( VITAMIN B100 COMPLEX) TABS Take by mouth, Historical Med      benzonatate (TESSALON PERLES) 100 mg capsule Take 1 capsule (100 mg total) by mouth 3 (three) times a day as needed for cough, Starting Fri 12/30/2022, Normal      betamethasone valerate (VALISONE) 0 1 % cream APPLY TO AFFECTED AREA TOPICALLY EVERY DAY, Normal      Calcium Carb-Cholecalciferol 600-800 MG-UNIT TABS Take 1 tablet by mouth, Historical Med      celecoxib (CeleBREX) 200 mg capsule Take 1 capsule (200 mg total) by mouth daily, Starting Mon 9/26/2022, Normal      clobetasol (TEMOVATE) 0 05 % cream APPLY TOPICALLY TO AFFECTED AREA 2 X PER DAY, ARMS/LEGS FOR UP TO 2 WEEKS THEN 2-3 DAYS/WEEK, Normal      EPINEPHrine (EPIPEN) 0 3 mg/0 3 mL SOAJ Inject as directed, Historical Med      escitalopram (LEXAPRO) 5 mg tablet TAKE 1 TABLET (5 MG TOTAL) BY MOUTH DAILY  , Starting Wed 10/19/2022, Normal      ESTRACE VAGINAL 0 1 MG/GM vaginal cream ONE GRAM PER VAGINA TWICE WEEKLY, Normal      fluticasone (FLONASE) 50 mcg/act nasal spray into each nostril, Starting Wed 2/4/2015, Historical Med      LORazepam (ATIVAN) 1 mg tablet Take 1 tablet (1 mg total) by mouth 2 (two) times a day, Starting Thu 8/23/2018, Normal      methylPREDNISolone 4 MG tablet therapy pack Use as directed on package, Normal      Multiple Vitamin (MULTI-VITAMIN DAILY) TABS Take by mouth, Historical Med      neomycin-colistin-hydrocortisone-thonzonium (CORTISPORIN TC) 0 33-0 3-1-0 05 % otic suspension Administer into ears, Starting Tue 6/18/2013, Historical Med      omeprazole (PriLOSEC) 20 mg delayed release capsule Take 1 capsule (20 mg total) by mouth daily, Starting Mon 9/26/2022, Normal      Turmeric 500 MG CAPS Take by mouth, Historical Med      valACYclovir (VALTREX) 500 mg tablet Take 1 tablet (500 mg total) by mouth daily, Starting Mon 8/17/2020, Until Wed 10/26/2022, Normal             No discharge procedures on file      PDMP Review     None          ED Provider  Electronically Signed by           ALAINA Payne  01/17/23 3263

## 2023-03-01 ENCOUNTER — OFFICE VISIT (OUTPATIENT)
Dept: URGENT CARE | Facility: CLINIC | Age: 68
End: 2023-03-01

## 2023-03-01 VITALS
TEMPERATURE: 98.6 F | DIASTOLIC BLOOD PRESSURE: 100 MMHG | OXYGEN SATURATION: 98 % | SYSTOLIC BLOOD PRESSURE: 152 MMHG | RESPIRATION RATE: 18 BRPM | HEART RATE: 75 BPM

## 2023-03-01 DIAGNOSIS — J06.9 URI, ACUTE: Primary | ICD-10-CM

## 2023-03-01 RX ORDER — PREDNISONE 20 MG/1
20 TABLET ORAL 2 TIMES DAILY WITH MEALS
Qty: 12 TABLET | Refills: 0 | Status: SHIPPED | OUTPATIENT
Start: 2023-03-01 | End: 2023-03-07

## 2023-03-01 RX ORDER — AZITHROMYCIN 250 MG/1
TABLET, FILM COATED ORAL
Qty: 6 TABLET | Refills: 0 | Status: SHIPPED | OUTPATIENT
Start: 2023-03-01 | End: 2023-03-01 | Stop reason: ALTCHOICE

## 2023-03-01 RX ORDER — AMOXICILLIN 500 MG/1
500 CAPSULE ORAL EVERY 12 HOURS SCHEDULED
Qty: 14 CAPSULE | Refills: 0 | Status: SHIPPED | OUTPATIENT
Start: 2023-03-01 | End: 2023-03-08

## 2023-03-01 NOTE — PROGRESS NOTES
[facility]        NAME: Nasir Walter is a 79 y o  female  : 1955    MRN: [id number]  DATE: 2023  TIME: 5:28 PM    Assessment and Plan   URI, acute [J06 9]  1  URI, acute  predniSONE 20 mg tablet    amoxicillin (AMOXIL) 500 mg capsule    DISCONTINUED: azithromycin (ZITHROMAX) 250 mg tablet            Patient Instructions       Follow up with PCP in 3-5 days  Proceed to  ER if symptoms worsen  Chief Complaint     Chief Complaint   Patient presents with   • Cold Like Symptoms     Pt c/o cough, stuffy nose, h/a, fever 100, and body aches for 1 week  Taking OTC motrin, and mucinex  Swabbed self for covid 3 x negative  History of Present Illness       Patient is a 80 yo female who presents for evaluation of cough, nasal congestion, PND, headaches x 1 week  She has been taking OTC medication without significant relief or improvement  She denies SOB and CP  Review of Systems   Review of Systems   Constitutional: Negative for fever  Respiratory: Positive for cough  Negative for chest tightness, shortness of breath and wheezing  Cardiovascular: Negative for chest pain           Current Medications       Current Outpatient Medications:   •  albuterol (ProAir HFA) 90 mcg/act inhaler, Inhale 2 puffs every 6 (six) hours as needed for wheezing, Disp: 8 5 g, Rfl: 0  •  amLODIPine-benazepril (LOTREL) 10-20 MG per capsule, TAKE 1 CAPSULE BY MOUTH EVERY DAY, Disp: 90 capsule, Rfl: 3  •  amoxicillin (AMOXIL) 500 mg capsule, Take 1 capsule (500 mg total) by mouth every 12 (twelve) hours for 7 days, Disp: 14 capsule, Rfl: 0  •  ascorbic acid (VITAMIN C) 500 mg tablet, Take 500 mg by mouth, Disp: , Rfl:   •  B Complex-Biotin-FA (HM VITAMIN B100 COMPLEX) TABS, Take by mouth, Disp: , Rfl:   •  Calcium Carb-Cholecalciferol 600-800 MG-UNIT TABS, Take 1 tablet by mouth, Disp: , Rfl:   •  celecoxib (CeleBREX) 200 mg capsule, Take 1 capsule (200 mg total) by mouth daily, Disp: 90 capsule, Rfl: 1  •  clobetasol (TEMOVATE) 0 05 % cream, APPLY TOPICALLY TO AFFECTED AREA 2 X PER DAY, ARMS/LEGS FOR UP TO 2 WEEKS THEN 2-3 DAYS/WEEK, Disp: 30 g, Rfl: 5  •  Multiple Vitamin (MULTI-VITAMIN DAILY) TABS, Take by mouth, Disp: , Rfl:   •  omeprazole (PriLOSEC) 20 mg delayed release capsule, Take 1 capsule (20 mg total) by mouth daily, Disp: 90 capsule, Rfl: 3  •  predniSONE 20 mg tablet, Take 1 tablet (20 mg total) by mouth 2 (two) times a day with meals for 6 days, Disp: 12 tablet, Rfl: 0  •  Turmeric 500 MG CAPS, Take by mouth, Disp: , Rfl:   •  benzonatate (TESSALON PERLES) 100 mg capsule, Take 1 capsule (100 mg total) by mouth 3 (three) times a day as needed for cough (Patient not taking: Reported on 3/1/2023), Disp: 20 capsule, Rfl: 0  •  betamethasone valerate (VALISONE) 0 1 % cream, APPLY TO AFFECTED AREA TOPICALLY EVERY DAY (Patient not taking: Reported on 12/30/2022), Disp: 30 g, Rfl: 1  •  EPINEPHrine (EPIPEN) 0 3 mg/0 3 mL SOAJ, Inject as directed, Disp: , Rfl:   •  escitalopram (LEXAPRO) 5 mg tablet, TAKE 1 TABLET (5 MG TOTAL) BY MOUTH DAILY   (Patient not taking: Reported on 12/30/2022), Disp: 90 tablet, Rfl: 1  •  ESTRACE VAGINAL 0 1 MG/GM vaginal cream, ONE GRAM PER VAGINA TWICE WEEKLY, Disp: 42 5 g, Rfl: 0  •  fluticasone (FLONASE) 50 mcg/act nasal spray, into each nostril (Patient not taking: No sig reported), Disp: , Rfl:   •  LORazepam (ATIVAN) 1 mg tablet, Take 1 tablet (1 mg total) by mouth 2 (two) times a day (Patient not taking: Reported on 12/30/2022), Disp: 15 tablet, Rfl: 0  •  methylPREDNISolone 4 MG tablet therapy pack, Use as directed on package (Patient not taking: Reported on 3/1/2023), Disp: 21 tablet, Rfl: 0  •  neomycin-colistin-hydrocortisone-thonzonium (CORTISPORIN TC) 0 33-0 3-1-0 05 % otic suspension, Administer into ears (Patient not taking: Reported on 3/1/2023), Disp: , Rfl:   •  valACYclovir (VALTREX) 500 mg tablet, Take 1 tablet (500 mg total) by mouth daily, Disp: 90 tablet, Rfl: 3    Current Allergies     Allergies as of 03/01/2023 - Reviewed 03/01/2023   Allergen Reaction Noted   • Bee venom Anaphylaxis 05/03/2009            The following portions of the patient's history were reviewed and updated as appropriate: allergies, current medications, past family history, past medical history, past social history, past surgical history and problem list      Past Medical History:   Diagnosis Date   • Abnormal mammogram     last assessed-8/12/2014   • Allergic    • Arthritis    • Chest pain     last assessed-6/17/2013-non cardiac   • Chronic maxillary sinusitis     last assessed-11/10/2015   • COPD (chronic obstructive pulmonary disease) ([facility] [address] )    • Fatty liver     last assessed-6/11/2014   • GERD (gastroesophageal reflux disease)    • Hypertension    • Lichen planus    • Lyme disease     last assessed-6/17/2013   • Raynaud's disease    • Tick bites     last assessed-6/18/2013       Past Surgical History:   Procedure Laterality Date   • ABDOMINAL SURGERY     • EYE SURGERY Left     childhood       Family History   Problem Relation Age of Onset   • Coronary artery disease Father    • Cancer Father    • Lung cancer Father    • Cancer Maternal Aunt    • Breast cancer Maternal Aunt          Medications have been verified  Objective   /100   Pulse 75   Temp 98 6 °F (37 °C)   Resp 18   SpO2 98%        Physical Exam     Physical Exam  Constitutional:       General: She is not in acute distress  Appearance: Normal appearance  She is not ill-appearing or diaphoretic  HENT:      Right Ear: Tympanic membrane, ear canal and external ear normal       Left Ear: Tympanic membrane, ear canal and external ear normal       Nose: Nose normal       Mouth/Throat:      Mouth: Mucous membranes are moist       Pharynx: Oropharynx is clear  Eyes:      Conjunctiva/sclera: Conjunctivae normal    Cardiovascular:      Rate and Rhythm: Normal rate and regular rhythm        Heart sounds: Normal heart sounds  Pulmonary:      Effort: Pulmonary effort is normal       Breath sounds: Rhonchi present  No wheezing or rales  Comments: Scattered expiratory rhonchi in b/l upper lung fields   Skin:     General: Skin is warm and dry  Neurological:      Mental Status: She is alert     Psychiatric:         Mood and Affect: Mood normal          Behavior: Behavior normal

## 2023-03-21 ENCOUNTER — OFFICE VISIT (OUTPATIENT)
Dept: INTERNAL MEDICINE CLINIC | Facility: CLINIC | Age: 68
End: 2023-03-21

## 2023-03-21 VITALS
WEIGHT: 185 LBS | OXYGEN SATURATION: 99 % | SYSTOLIC BLOOD PRESSURE: 144 MMHG | HEART RATE: 74 BPM | RESPIRATION RATE: 16 BRPM | BODY MASS INDEX: 29.03 KG/M2 | HEIGHT: 67 IN | TEMPERATURE: 98.6 F | DIASTOLIC BLOOD PRESSURE: 78 MMHG

## 2023-03-21 DIAGNOSIS — R79.9 ABNORMAL FINDING OF BLOOD CHEMISTRY, UNSPECIFIED: ICD-10-CM

## 2023-03-21 DIAGNOSIS — L43.9 LP (LICHEN PLANUS): ICD-10-CM

## 2023-03-21 DIAGNOSIS — Z78.0 POST-MENOPAUSAL: ICD-10-CM

## 2023-03-21 DIAGNOSIS — M89.8X9 EXOSTOSIS: ICD-10-CM

## 2023-03-21 DIAGNOSIS — F41.9 ANXIETY DISORDER, UNSPECIFIED TYPE: ICD-10-CM

## 2023-03-21 DIAGNOSIS — E78.5 HYPERLIPIDEMIA, UNSPECIFIED HYPERLIPIDEMIA TYPE: Primary | ICD-10-CM

## 2023-03-21 DIAGNOSIS — K21.9 GASTROESOPHAGEAL REFLUX DISEASE WITHOUT ESOPHAGITIS: ICD-10-CM

## 2023-03-21 DIAGNOSIS — I10 HYPERTENSION, UNSPECIFIED TYPE: ICD-10-CM

## 2023-03-21 DIAGNOSIS — E55.9 VITAMIN D DEFICIENCY: ICD-10-CM

## 2023-03-21 PROBLEM — E87.1 HYPONATREMIA: Status: RESOLVED | Noted: 2018-01-23 | Resolved: 2023-03-21

## 2023-03-21 PROBLEM — H92.03 OTALGIA OF BOTH EARS: Status: RESOLVED | Noted: 2018-08-23 | Resolved: 2023-03-21

## 2023-03-21 PROBLEM — R77.8 ELEVATED TOTAL PROTEIN: Status: RESOLVED | Noted: 2018-01-23 | Resolved: 2023-03-21

## 2023-03-21 PROBLEM — Z01.419 ENCOUNTER FOR GYNECOLOGICAL EXAMINATION WITH PAPANICOLAOU SMEAR OF CERVIX: Status: RESOLVED | Noted: 2019-07-29 | Resolved: 2023-03-21

## 2023-03-21 PROBLEM — Z12.11 ENCOUNTER FOR SCREENING COLONOSCOPY: Status: RESOLVED | Noted: 2018-08-23 | Resolved: 2023-03-21

## 2023-03-21 PROBLEM — Z12.39 ENCOUNTER FOR OTHER SCREENING FOR MALIGNANT NEOPLASM OF BREAST: Status: RESOLVED | Noted: 2019-06-28 | Resolved: 2023-03-21

## 2023-03-21 NOTE — PROGRESS NOTES
Assessment/Plan:     Nolvia Torre is here to establish care; she is  and is a  and will be starting her work season soon;     she has a PMH for well controlled HTN and well controlled HLD; she reports lichen planus bumps to her lower legs, small of her back, and wrist; was seeing dermatology which I recommended she reach out to for screening  Due for colon cancer screening and DEXA scan; she has a cologuard at home that she will get done; screening for cervical cancer has ended  Last one in 2019 was normal     B/l exostosis of ears noted  Quality Measures:     BMI Counseling: Body mass index is 28 98 kg/m²  The BMI is above normal  Nutrition recommendations include decreasing portion sizes and encouraging healthy choices of fruits and vegetables  Exercise recommendations include moderate physical activity 150 minutes/week  Rationale for BMI follow-up plan is due to patient being overweight or obese  Return in about 6 months (around 9/28/2023)  No problem-specific Assessment & Plan notes found for this encounter  Diagnoses and all orders for this visit:    Hyperlipidemia, unspecified hyperlipidemia type  -     Hemoglobin A1C; Future  -     Lipid Panel with Direct LDL reflex; Future    Hypertension, unspecified type  -     CBC and differential; Future  -     Comprehensive metabolic panel; Future  -     TSH, 3rd generation with Free T4 reflex; Future    Anxiety disorder, unspecified type    Gastroesophageal reflux disease without esophagitis    LP (lichen planus)    Vitamin D deficiency  -     Vitamin D 25 hydroxy; Future    Abnormal finding of blood chemistry, unspecified  -     Hemoglobin A1C; Future    Post-menopausal  -     DXA bone density spine hip and pelvis; Future    Exostosis          Subjective:      Patient ID: Jerry Carlos is a 79 y o  female  Here to establish care; former gama pt        ALLERGIES:  Allergies   Allergen Reactions   • Bee Venom Anaphylaxis REQUIRES EPI PEN  REQUIRES EPI PEN       CURRENT MEDICATIONS:    Current Outpatient Medications:   •  albuterol (ProAir HFA) 90 mcg/act inhaler, Inhale 2 puffs every 6 (six) hours as needed for wheezing, Disp: 8 5 g, Rfl: 0  •  amLODIPine-benazepril (LOTREL) 10-20 MG per capsule, TAKE 1 CAPSULE BY MOUTH EVERY DAY, Disp: 90 capsule, Rfl: 3  •  ascorbic acid (VITAMIN C) 500 mg tablet, Take 500 mg by mouth, Disp: , Rfl:   •  B Complex-Biotin-FA (HM VITAMIN B100 COMPLEX) TABS, Take by mouth, Disp: , Rfl:   •  Calcium Carb-Cholecalciferol 600-800 MG-UNIT TABS, Take 1 tablet by mouth, Disp: , Rfl:   •  celecoxib (CeleBREX) 200 mg capsule, Take 1 capsule (200 mg total) by mouth daily, Disp: 90 capsule, Rfl: 1  •  clobetasol (TEMOVATE) 0 05 % cream, APPLY TOPICALLY TO AFFECTED AREA 2 X PER DAY, ARMS/LEGS FOR UP TO 2 WEEKS THEN 2-3 DAYS/WEEK, Disp: 30 g, Rfl: 5  •  EPINEPHrine (EPIPEN) 0 3 mg/0 3 mL SOAJ, Inject as directed, Disp: , Rfl:   •  escitalopram (LEXAPRO) 5 mg tablet, TAKE 1 TABLET (5 MG TOTAL) BY MOUTH DAILY  , Disp: 90 tablet, Rfl: 1  •  ESTRACE VAGINAL 0 1 MG/GM vaginal cream, ONE GRAM PER VAGINA TWICE WEEKLY, Disp: 42 5 g, Rfl: 0  •  Multiple Vitamin (MULTI-VITAMIN DAILY) TABS, Take by mouth, Disp: , Rfl:   •  omeprazole (PriLOSEC) 20 mg delayed release capsule, Take 1 capsule (20 mg total) by mouth daily, Disp: 90 capsule, Rfl: 3  •  Turmeric 500 MG CAPS, Take by mouth, Disp: , Rfl:   •  valACYclovir (VALTREX) 500 mg tablet, Take 1 tablet (500 mg total) by mouth daily, Disp: 90 tablet, Rfl: 3    ACTIVE PROBLEM LIST:  Patient Active Problem List   Diagnosis   • Allergic rhinitis, seasonal   • Other anaphylactic reaction   • Anxiety disorder   • Atopic dermatitis   • Claustrophobia   • Depression   • Esophageal reflux   • Fibrocystic breast disease   • Herpes simplex infection   • Herpetic gingivostomatitis   • Hyperlipidemia   • Hypertension   • LP (lichen planus)   • Ophthalmoplegic migraine headache   • Raynaud's disease, idiopathic   • Vitamin D deficiency   • Allergy to bee sting   • Deformity of toenail   • Encounter for hepatitis C screening test for low risk patient   • Chronic pain of both knees       PAST MEDICAL HISTORY:  Past Medical History:   Diagnosis Date   • Abnormal mammogram     last assessed-8/12/2014   • Allergic    • Arthritis    • Chest pain     last assessed-6/17/2013-non cardiac   • Chronic maxillary sinusitis     last assessed-11/10/2015   • COPD (chronic obstructive pulmonary disease) (HCC)    • Fatty liver     last assessed-6/11/2014   • GERD (gastroesophageal reflux disease)    • Hypertension    • Lichen planus    • Lyme disease     last assessed-6/17/2013   • Raynaud's disease    • Tick bites     last assessed-6/18/2013       PAST SURGICAL HISTORY:  Past Surgical History:   Procedure Laterality Date   • ABDOMINAL SURGERY     • EYE SURGERY Left     childhood       FAMILY HISTORY:  Family History   Problem Relation Age of Onset   • Coronary artery disease Father    • Cancer Father    • Lung cancer Father    • Cancer Maternal Aunt    • Breast cancer Maternal Aunt        SOCIAL HISTORY:  Social History     Socioeconomic History   • Marital status: /Civil Union     Spouse name: Not on file   • Number of children: Not on file   • Years of education: Not on file   • Highest education level: Not on file   Occupational History   • Not on file   Tobacco Use   • Smoking status: Never   • Smokeless tobacco: Never   Vaping Use   • Vaping Use: Never used   Substance and Sexual Activity   • Alcohol use:  Yes     Alcohol/week: 2 0 standard drinks     Types: 2 Glasses of wine per week   • Drug use: No   • Sexual activity: Not Currently     Partners: Male     Birth control/protection: Abstinence   Other Topics Concern   • Not on file   Social History Narrative    Caffeine use - 2 cups coffee/day                         - 2 cups green tea/day     Social Determinants of Health     Financial Resource Strain: Low Risk    • Difficulty of Paying Living Expenses: Not hard at all   Food Insecurity: Not on file   Transportation Needs: No Transportation Needs   • Lack of Transportation (Medical): No   • Lack of Transportation (Non-Medical): No   Physical Activity: Not on file   Stress: Not on file   Social Connections: Not on file   Intimate Partner Violence: Not on file   Housing Stability: Not on file       Review of Systems   HENT: Positive for tinnitus  Musculoskeletal: Positive for arthralgias  Skin: Positive for color change and rash  All other systems reviewed and are negative  Objective:  Vitals:    03/21/23 0804   BP: 144/78   BP Location: Right arm   Patient Position: Sitting   Cuff Size: Standard   Pulse: 74   Resp: 16   Temp: 98 6 °F (37 °C)   TempSrc: Tympanic   SpO2: 99%   Weight: 83 9 kg (185 lb)   Height: 5' 7" (1 702 m)     Body mass index is 28 98 kg/m²  Physical Exam  Vitals and nursing note reviewed  Constitutional:       Appearance: Normal appearance  She is obese  HENT:      Ears:      Comments: B/l ear exostosis   Cardiovascular:      Rate and Rhythm: Normal rate and regular rhythm  Heart sounds: Normal heart sounds  Pulmonary:      Effort: Pulmonary effort is normal       Breath sounds: Normal breath sounds  Musculoskeletal:         General: Normal range of motion  Cervical back: Normal range of motion  Skin:     General: Skin is warm  Neurological:      General: No focal deficit present  Mental Status: She is alert and oriented to person, place, and time  Mental status is at baseline  Psychiatric:         Mood and Affect: Mood normal          Behavior: Behavior normal            RESULTS:    In chart    This note was created with voice recognition software  Phonic, grammatical and spelling errors may be present within the note as a result

## 2023-05-01 DIAGNOSIS — F41.9 ANXIETY DISORDER, UNSPECIFIED TYPE: ICD-10-CM

## 2023-05-01 RX ORDER — ESCITALOPRAM OXALATE 5 MG/1
5 TABLET ORAL DAILY
Qty: 90 TABLET | Refills: 1 | Status: SHIPPED | OUTPATIENT
Start: 2023-05-01

## 2023-05-06 DIAGNOSIS — M25.562 CHRONIC PAIN OF BOTH KNEES: ICD-10-CM

## 2023-05-06 DIAGNOSIS — G89.29 CHRONIC PAIN OF BOTH KNEES: ICD-10-CM

## 2023-05-06 DIAGNOSIS — M25.561 CHRONIC PAIN OF BOTH KNEES: ICD-10-CM

## 2023-05-08 RX ORDER — CELECOXIB 200 MG/1
CAPSULE ORAL
Qty: 90 CAPSULE | Refills: 1 | Status: SHIPPED | OUTPATIENT
Start: 2023-05-08

## 2023-07-19 DIAGNOSIS — L43.9 LP (LICHEN PLANUS): ICD-10-CM

## 2023-07-19 DIAGNOSIS — L20.9 ATOPIC DERMATITIS, UNSPECIFIED TYPE: ICD-10-CM

## 2023-07-21 DIAGNOSIS — I10 HYPERTENSION, UNSPECIFIED TYPE: ICD-10-CM

## 2023-07-21 RX ORDER — AMLODIPINE BESYLATE AND BENAZEPRIL HYDROCHLORIDE 10; 20 MG/1; MG/1
CAPSULE ORAL
Qty: 90 CAPSULE | Refills: 3 | Status: SHIPPED | OUTPATIENT
Start: 2023-07-21

## 2023-07-24 ENCOUNTER — OFFICE VISIT (OUTPATIENT)
Dept: URGENT CARE | Facility: CLINIC | Age: 68
End: 2023-07-24
Payer: MEDICARE

## 2023-07-24 VITALS
BODY MASS INDEX: 29.48 KG/M2 | TEMPERATURE: 97.4 F | RESPIRATION RATE: 20 BRPM | HEART RATE: 59 BPM | DIASTOLIC BLOOD PRESSURE: 74 MMHG | WEIGHT: 188.2 LBS | OXYGEN SATURATION: 99 % | SYSTOLIC BLOOD PRESSURE: 136 MMHG

## 2023-07-24 DIAGNOSIS — J40 BRONCHITIS: Primary | ICD-10-CM

## 2023-07-24 PROCEDURE — 99213 OFFICE O/P EST LOW 20 MIN: CPT | Performed by: PHYSICIAN ASSISTANT

## 2023-07-24 PROCEDURE — G0463 HOSPITAL OUTPT CLINIC VISIT: HCPCS | Performed by: PHYSICIAN ASSISTANT

## 2023-07-24 RX ORDER — PREDNISONE 20 MG/1
10 TABLET ORAL DAILY
Qty: 5 TABLET | Refills: 0 | Status: SHIPPED | OUTPATIENT
Start: 2023-07-24 | End: 2023-08-03

## 2023-07-24 RX ORDER — AMOXICILLIN 500 MG/1
500 CAPSULE ORAL EVERY 12 HOURS SCHEDULED
Qty: 14 CAPSULE | Refills: 0 | Status: SHIPPED | OUTPATIENT
Start: 2023-07-24 | End: 2023-07-31

## 2023-07-24 NOTE — PROGRESS NOTES
[facility]        NAME: Toshia Boone is a 79 y.o. female  : 1955    MRN: [id number]  DATE: 2023  TIME: 8:30 AM    Assessment and Plan   Bronchitis [J40]  1. Bronchitis  predniSONE 20 mg tablet    amoxicillin (AMOXIL) 500 mg capsule            Patient Instructions       Follow up with PCP in 3-5 days. Proceed to  ER if symptoms worsen. Chief Complaint     Chief Complaint   Patient presents with   • Cold Like Symptoms     Pt states she has wheezing, cough, mucus, and chest congestion for a couple weeks. 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 Used zyrtec and mucinex. History of Present Illness       Patient is a 78 yo female who presents for evaluation of cough and chest congestion x 2 weeks. The cough is productive and she reports associated wheezing. She has been taking Mucinex and Zyrtec for her symptoms. She denies fevers, SOB, CP. Review of Systems   Review of Systems   Constitutional: Negative for fever. HENT: Positive for congestion. Respiratory: Positive for cough and wheezing. Negative for chest tightness and shortness of breath. Cardiovascular: Negative for chest pain. Musculoskeletal: Negative for arthralgias and myalgias. Neurological: Negative for dizziness. Current Medications       Current Outpatient Medications:   •  ascorbic acid (VITAMIN C) 500 mg tablet, Take 500 mg by mouth, Disp: , Rfl:   •  B Complex-Biotin-FA (HM VITAMIN B100 COMPLEX) TABS, Take by mouth, Disp: , Rfl:   •  Calcium Carb-Cholecalciferol 600-800 MG-UNIT TABS, Take 1 tablet by mouth, Disp: , Rfl:   •  EPINEPHrine (EPIPEN) 0.3 mg/0.3 mL SOAJ, Inject as directed, Disp: , Rfl:   •  escitalopram (LEXAPRO) 5 mg tablet, TAKE 1 TABLET (5 MG TOTAL) BY MOUTH DAILY.  (Patient not taking: Reported on 2023), Disp: 90 tablet, Rfl: 1  •  ESTRACE VAGINAL 0.1 MG/GM vaginal cream, ONE GRAM PER VAGINA TWICE WEEKLY, Disp: 42.5 g, Rfl: 0  •  Multiple Vitamin (MULTI-VITAMIN DAILY) TABS, Take by mouth, Disp: , Rfl:   •  albuterol (ProAir HFA) 90 mcg/act inhaler, Inhale 2 puffs every 6 (six) hours as needed for wheezing, Disp: 8.5 g, Rfl: 0  •  amLODIPine-benazepril (LOTREL) 10-20 MG per capsule, TAKE 1 CAPSULE BY MOUTH EVERY DAY, Disp: 90 capsule, Rfl: 3  •  amoxicillin (AMOXIL) 500 mg capsule, Take 1 capsule (500 mg total) by mouth every 12 (twelve) hours for 7 days, Disp: 14 capsule, Rfl: 0  •  betamethasone valerate (VALISONE) 0.1 % cream, APPLY TO AFFECTED AREA TOPICALLY EVERY DAY, Disp: 30 g, Rfl: 1  •  celecoxib (CeleBREX) 200 mg capsule, TAKE 1 CAPSULE BY MOUTH EVERY DAY, Disp: 90 capsule, Rfl: 1  •  clobetasol (TEMOVATE) 0.05 % cream, APPLY TOPICALLY TO AFFECTED AREA 2 X PER DAY, ARMS/LEGS FOR UP TO 2 WEEKS THEN 2-3 DAYS/WEEK, Disp: 30 g, Rfl: 5  •  omeprazole (PriLOSEC) 20 mg delayed release capsule, Take 1 capsule (20 mg total) by mouth daily, Disp: 90 capsule, Rfl: 3  •  predniSONE 20 mg tablet, Take 0.5 tablets (10 mg total) by mouth daily for 10 days, Disp: 5 tablet, Rfl: 0  •  Turmeric 500 MG CAPS, Take by mouth, Disp: , Rfl:   •  valACYclovir (VALTREX) 500 mg tablet, Take 1 tablet (500 mg total) by mouth daily, Disp: 90 tablet, Rfl: 3    Current Allergies     Allergies as of 07/24/2023 - Reviewed 07/24/2023   Allergen Reaction Noted   • Bee venom Anaphylaxis 05/03/2009            The following portions of the patient's history were reviewed and updated as appropriate: allergies, current medications, past family history, past medical history, past social history, past surgical history and problem list.     Past Medical History:   Diagnosis Date   • Abnormal mammogram     last assessed-8/12/2014   • Allergic    • Arthritis    • Chest pain     last assessed-6/17/2013-non cardiac   • Chronic maxillary sinusitis     last assessed-11/10/2015   • COPD (chronic obstructive pulmonary disease) (HCC)    • Fatty liver     last assessed-6/11/2014   • GERD (gastroesophageal reflux disease)    • Hypertension    • Lichen planus    • Lyme disease     last assessed-6/17/2013   • Raynaud's disease    • Tick bites     last assessed-6/18/2013       Past Surgical History:   Procedure Laterality Date   • ABDOMINAL SURGERY     • EYE SURGERY Left     childhood       Family History   Problem Relation Age of Onset   • Coronary artery disease Father    • Cancer Father    • Lung cancer Father    • Cancer Maternal Aunt    • Breast cancer Maternal Aunt          Medications have been verified. Objective   /74   Pulse 59   Temp (!) 97.4 °F (36.3 °C)   Resp 20   Wt 85.4 kg (188 lb 3.2 oz)   SpO2 99%   BMI 29.48 kg/m²        Physical Exam     Physical Exam  Constitutional:       General: She is not in acute distress. Appearance: She is not toxic-appearing. HENT:      Nose: Congestion present. Mouth/Throat:      Mouth: Mucous membranes are moist.      Pharynx: Oropharynx is clear. Cardiovascular:      Rate and Rhythm: Normal rate. Heart sounds: Normal heart sounds. Pulmonary:      Effort: Pulmonary effort is normal.      Breath sounds: Normal breath sounds. No wheezing, rhonchi or rales. Skin:     General: Skin is warm and dry. Neurological:      Mental Status: She is alert.    Psychiatric:         Mood and Affect: Mood normal.         Behavior: Behavior normal.

## 2023-08-05 LAB — COLOGUARD RESULT REPORTABLE: NEGATIVE

## 2023-08-10 ENCOUNTER — HOSPITAL ENCOUNTER (EMERGENCY)
Facility: HOSPITAL | Age: 68
Discharge: HOME | End: 2023-08-10
Attending: EMERGENCY MEDICINE
Payer: MEDICARE

## 2023-08-10 ENCOUNTER — NEW PATIENT EMERGENCY (OUTPATIENT)
Dept: URBAN - METROPOLITAN AREA CLINIC 6 | Facility: CLINIC | Age: 68
End: 2023-08-10

## 2023-08-10 VITALS
SYSTOLIC BLOOD PRESSURE: 192 MMHG | OXYGEN SATURATION: 99 % | RESPIRATION RATE: 18 BRPM | DIASTOLIC BLOOD PRESSURE: 109 MMHG | HEART RATE: 64 BPM | TEMPERATURE: 97.8 F

## 2023-08-10 DIAGNOSIS — H04.123: ICD-10-CM

## 2023-08-10 DIAGNOSIS — S05.02XA CORNEAL ABRASION, LEFT, INITIAL ENCOUNTER: Primary | ICD-10-CM

## 2023-08-10 DIAGNOSIS — H11.32 SUBCONJUNCTIVAL HEMORRHAGE OF LEFT EYE: ICD-10-CM

## 2023-08-10 DIAGNOSIS — S05.02XA: ICD-10-CM

## 2023-08-10 PROCEDURE — 99284 EMERGENCY DEPT VISIT MOD MDM: CPT | Performed by: PHYSICIAN ASSISTANT

## 2023-08-10 PROCEDURE — 92002 INTRM OPH EXAM NEW PATIENT: CPT

## 2023-08-10 PROCEDURE — 99283 EMERGENCY DEPT VISIT LOW MDM: CPT

## 2023-08-10 RX ORDER — TETRACAINE HYDROCHLORIDE 5 MG/ML
2 SOLUTION OPHTHALMIC ONCE
Status: COMPLETED | OUTPATIENT
Start: 2023-08-10 | End: 2023-08-10

## 2023-08-10 RX ORDER — ERYTHROMYCIN 5 MG/G
0.5 OINTMENT OPHTHALMIC ONCE
Status: COMPLETED | OUTPATIENT
Start: 2023-08-10 | End: 2023-08-10

## 2023-08-10 RX ORDER — ERYTHROMYCIN 5 MG/G
OINTMENT OPHTHALMIC
Qty: 3.5 G | Refills: 0 | Status: SHIPPED | OUTPATIENT
Start: 2023-08-10

## 2023-08-10 RX ADMIN — ERYTHROMYCIN 0.5 INCH: 5 OINTMENT OPHTHALMIC at 08:49

## 2023-08-10 RX ADMIN — FLUORESCEIN SODIUM 1 STRIP: 1 STRIP OPHTHALMIC at 08:28

## 2023-08-10 RX ADMIN — TETRACAINE HYDROCHLORIDE 2 DROP: 5 SOLUTION OPHTHALMIC at 08:28

## 2023-08-10 ASSESSMENT — VISUAL ACUITY
OD_SC: 20/30
OS_SC: 20/60-1

## 2023-08-10 ASSESSMENT — TONOMETRY
OD_IOP_MMHG: 22
OS_IOP_MMHG: 22

## 2023-08-10 NOTE — ED PROVIDER NOTES
History  Chief Complaint   Patient presents with   • Eye Injury     Pt reports reaching in cupboard to grab porcelain dish, dish hit counter, shattered parts hit into L eye, denies vision changes. Denies blood thinners or LOC      68yo female with a history of hypertension, COPD, and GERD presenting for evaluation of a left eye injury that was sustained about an hour ago. Patient was reaching into her cupboard to grab a porcelain dish. The dish fell on the counter and shattered. A piece of the porcelain hit her left eye causing a small amount of bleeding. She flushed her eye with water and no longer has a foreign body sensation. She has left eye irritation but denies any pain. She denies any visual changes or photophobia. She wears glasses but does not wear contact lenses. History provided by:  Patient   used: No        Prior to Admission Medications   Prescriptions Last Dose Informant Patient Reported? Taking?    B Complex-Biotin-FA (HM VITAMIN B100 COMPLEX) TABS  Self Yes No   Sig: Take by mouth   Calcium Carb-Cholecalciferol 600-800 MG-UNIT TABS  Self Yes No   Sig: Take 1 tablet by mouth   EPINEPHrine (EPIPEN) 0.3 mg/0.3 mL SOAJ  Self Yes No   Sig: Inject as directed   ESTRACE VAGINAL 0.1 MG/GM vaginal cream  Self No No   Sig: ONE GRAM PER VAGINA TWICE WEEKLY   Multiple Vitamin (MULTI-VITAMIN DAILY) TABS  Self Yes No   Sig: Take by mouth   Turmeric 500 MG CAPS  Self Yes No   Sig: Take by mouth   albuterol (ProAir HFA) 90 mcg/act inhaler   No No   Sig: Inhale 2 puffs every 6 (six) hours as needed for wheezing   amLODIPine-benazepril (LOTREL) 10-20 MG per capsule   No No   Sig: TAKE 1 CAPSULE BY MOUTH EVERY DAY   ascorbic acid (VITAMIN C) 500 mg tablet  Self Yes No   Sig: Take 500 mg by mouth   betamethasone valerate (VALISONE) 0.1 % cream   No No   Sig: APPLY TO AFFECTED AREA TOPICALLY EVERY DAY   celecoxib (CeleBREX) 200 mg capsule   No No   Sig: TAKE 1 CAPSULE BY MOUTH EVERY DAY clobetasol (TEMOVATE) 0.05 % cream   No No   Sig: APPLY TOPICALLY TO AFFECTED AREA 2 X PER DAY, ARMS/LEGS FOR UP TO 2 WEEKS THEN 2-3 DAYS/WEEK   escitalopram (LEXAPRO) 5 mg tablet   No No   Sig: TAKE 1 TABLET (5 MG TOTAL) BY MOUTH DAILY. Patient not taking: Reported on 7/24/2023   omeprazole (PriLOSEC) 20 mg delayed release capsule   No No   Sig: Take 1 capsule (20 mg total) by mouth daily   valACYclovir (VALTREX) 500 mg tablet   No No   Sig: Take 1 tablet (500 mg total) by mouth daily      Facility-Administered Medications: None       Past Medical History:   Diagnosis Date   • Abnormal mammogram     last assessed-8/12/2014   • Allergic    • Arthritis    • Chest pain     last assessed-6/17/2013-non cardiac   • Chronic maxillary sinusitis     last assessed-11/10/2015   • COPD (chronic obstructive pulmonary disease) (HCC)    • Fatty liver     last assessed-6/11/2014   • GERD (gastroesophageal reflux disease)    • Hypertension    • Lichen planus    • Lyme disease     last assessed-6/17/2013   • Raynaud's disease    • Tick bites     last assessed-6/18/2013       Past Surgical History:   Procedure Laterality Date   • ABDOMINAL SURGERY     • EYE SURGERY Left     childhood       Family History   Problem Relation Age of Onset   • Coronary artery disease Father    • Cancer Father    • Lung cancer Father    • Cancer Maternal Aunt    • Breast cancer Maternal Aunt      I have reviewed and agree with the history as documented. E-Cigarette/Vaping   • E-Cigarette Use Never User      E-Cigarette/Vaping Substances     Social History     Tobacco Use   • Smoking status: Never   • Smokeless tobacco: Never   Vaping Use   • Vaping Use: Never used   Substance Use Topics   • Alcohol use: Yes     Alcohol/week: 2.0 standard drinks of alcohol     Types: 2 Glasses of wine per week   • Drug use: No       Review of Systems   Constitutional: Negative for chills and fever. Eyes: Positive for pain and redness.  Negative for photophobia and visual disturbance. Gastrointestinal: Negative for diarrhea and vomiting. Skin: Negative for color change and rash. Psychiatric/Behavioral: Negative for confusion. The patient is not nervous/anxious. All other systems reviewed and are negative. Physical Exam  Physical Exam  Vitals and nursing note reviewed. Constitutional:       General: She is not in acute distress. Appearance: Normal appearance. She is not toxic-appearing. HENT:      Head: Normocephalic and atraumatic. Right Ear: External ear normal.      Left Ear: External ear normal.   Eyes:      General: No scleral icterus. Right eye: No discharge. Left eye: No discharge. Conjunctiva/sclera:      Left eye: Hemorrhage present. Comments: Left eye: Subconjunctival hemorrhage noted in medial eye. PERRL. EOMs intact. Normal visual fields. Visual acuity 20/20 on right, 20/40 on left. Large triangular shaped corneal defect noted on fluorescein stain. Negative Elena sign. Cardiovascular:      Rate and Rhythm: Normal rate. Pulmonary:      Effort: Pulmonary effort is normal. No respiratory distress. Breath sounds: No stridor. Musculoskeletal:         General: No deformity. Normal range of motion. Cervical back: Normal range of motion. Skin:     General: Skin is warm and dry. Neurological:      General: No focal deficit present. Mental Status: She is alert. Mental status is at baseline.    Psychiatric:         Mood and Affect: Mood normal.         Behavior: Behavior normal.                          Vital Signs  ED Triage Vitals [08/10/23 0812]   Temperature Pulse Respirations Blood Pressure SpO2   97.8 °F (36.6 °C) 64 18 (!) 192/109 99 %      Temp Source Heart Rate Source Patient Position - Orthostatic VS BP Location FiO2 (%)   Temporal Monitor Sitting Right arm --      Pain Score       --           Vitals:    08/10/23 0812   BP: (!) 192/109   Pulse: 64   Patient Position - Orthostatic VS: Sitting         Visual Acuity  Visual Acuity    Flowsheet Row Most Recent Value   Visual acuity R eye is 20/20   Visual acuity Left eye is 20/40   Visual acuity in both eyes is 20/20   Wearing corrective eyewear/lenses? No          ED Medications  Medications   tetracaine 0.5 % ophthalmic solution 2 drop (2 drops Left Eye Given 8/10/23 3731)   fluorescein sodium sterile ophthalmic strip 1 strip (1 strip Left Eye Given 8/10/23 0828)   erythromycin (ILOTYCIN) 0.5 % ophthalmic ointment 0.5 inch (0.5 inches Left Eye Given 8/10/23 0849)       Diagnostic Studies  Results Reviewed     None                 No orders to display              Procedures  Procedures         ED Course  ED Course as of 08/10/23 0929   Thu Aug 10, 2023   [phone] 4 attempts made to contact [facility] to establish follow up. No answer and there is no way to leave a voicemail. Ophthalmology paged. [phone] D/w Dr. Sergio Hart, ophthalmologist. We can send patient over to [facility] now and they will see her this morning. SBIRT 22yo+    Flowsheet Row Most Recent Value   Initial Alcohol Screen: US AUDIT-C     1. How often do you have a drink containing alcohol? 0 Filed at: 08/10/2023 0829   2. How many drinks containing alcohol do you have on a typical day you are drinking? 0 Filed at: 08/10/2023 0829   3a. Male UNDER 65: How often do you have five or more drinks on one occasion? 0 Filed at: 08/10/2023 0829   3b. FEMALE Any Age, or MALE 65+: How often do you have 4 or more drinks on one occassion? 0 Filed at: 08/10/2023 0829   Audit-C Score 0 Filed at: 08/10/2023 2157   MIGUELANGEL: How many times in the past year have you. .. Used an illegal drug or used a prescription medication for non-medical reasons? Never Filed at: 08/10/2023 4308                    Medical Decision Making  69yoF presenting for a L eye injury. A piece of porcelain hit her left eye. No visual disturbance or photophobia.  No overt pain but she is having discomfort in the eye. There is a subconjunctival hemorrhage on exam with a large triangular shaped corneal defect on fluorescein stain. Negative Elena sign. She was started on erythromycin ointment. I discussed case with the on call ophthalmologist who is able to see patient this morning. She was advised to go directly to [facility] after discharge for further care. Corneal abrasion, left, initial encounter: acute illness or injury  Subconjunctival hemorrhage of left eye: acute illness or injury  Risk  Prescription drug management. Disposition  Final diagnoses:   Corneal abrasion, left, initial encounter   Subconjunctival hemorrhage of left eye     Time reflects when diagnosis was documented in both MDM as applicable and the Disposition within this note     Time User Action Codes Description Comment    8/10/2023  8:48 AM Jennifer [address] [S05. 02XA] Corneal abrasion, left, initial encounter     8/10/2023  8:49 AM Jenifer Kaur Add [H11.32] Subconjunctival hemorrhage of left eye       ED Disposition     None      Follow-up Information     Follow up With Specialties Details Why [email or web address]   [address] [place] [address]  [phone]            Patient's Medications   Discharge Prescriptions    No medications on file       No discharge procedures on file.     PDMP Review     None          ED Provider  Electronically Signed by           Chrissy Restrepo PA-C  08/10/23 4094

## 2023-08-24 ENCOUNTER — FOLLOW UP (OUTPATIENT)
Dept: URBAN - METROPOLITAN AREA CLINIC 6 | Facility: CLINIC | Age: 68
End: 2023-08-24

## 2023-08-24 DIAGNOSIS — S05.02XD: ICD-10-CM

## 2023-08-24 PROCEDURE — 92012 INTRM OPH EXAM EST PATIENT: CPT

## 2023-08-24 ASSESSMENT — VISUAL ACUITY
OS_SC: 20/30-2
OD_SC: 20/25-1

## 2023-08-24 ASSESSMENT — TONOMETRY
OD_IOP_MMHG: 10
OS_IOP_MMHG: 9

## 2023-10-02 ENCOUNTER — OFFICE VISIT (OUTPATIENT)
Dept: INTERNAL MEDICINE CLINIC | Facility: CLINIC | Age: 68
End: 2023-10-02
Payer: MEDICARE

## 2023-10-02 ENCOUNTER — APPOINTMENT (OUTPATIENT)
Dept: LAB | Facility: CLINIC | Age: 68
End: 2023-10-02
Payer: MEDICARE

## 2023-10-02 VITALS
HEART RATE: 65 BPM | SYSTOLIC BLOOD PRESSURE: 122 MMHG | HEIGHT: 67 IN | DIASTOLIC BLOOD PRESSURE: 80 MMHG | WEIGHT: 188.4 LBS | BODY MASS INDEX: 29.57 KG/M2 | OXYGEN SATURATION: 99 %

## 2023-10-02 DIAGNOSIS — F41.9 ANXIETY: ICD-10-CM

## 2023-10-02 DIAGNOSIS — M79.605 PAIN IN BOTH LOWER EXTREMITIES: ICD-10-CM
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
## 2023-10-02 DIAGNOSIS — M89.8X9 EXOSTOSIS: ICD-10-CM

## 2023-10-02 DIAGNOSIS — R79.9 ABNORMAL FINDING OF BLOOD CHEMISTRY, UNSPECIFIED: ICD-10-CM

## 2023-10-02 DIAGNOSIS — E55.9 VITAMIN D DEFICIENCY: ICD-10-CM

## 2023-10-02 DIAGNOSIS — E78.5 HYPERLIPIDEMIA, UNSPECIFIED HYPERLIPIDEMIA TYPE: ICD-10-CM

## 2023-10-02 DIAGNOSIS — L43.9 LP (LICHEN PLANUS): ICD-10-CM

## 2023-10-02 DIAGNOSIS — Z00.00 MEDICARE ANNUAL WELLNESS VISIT, SUBSEQUENT: Primary | ICD-10-CM

## 2023-10-02 DIAGNOSIS — Z78.0 POST-MENOPAUSAL: ICD-10-CM

## 2023-10-02 DIAGNOSIS — M79.604 PAIN IN BOTH LOWER EXTREMITIES: ICD-10-CM

## 2023-10-02 DIAGNOSIS — Z12.31 SCREENING MAMMOGRAM FOR BREAST CANCER: ICD-10-CM

## 2023-10-02 DIAGNOSIS — I10 HYPERTENSION, UNSPECIFIED TYPE: ICD-10-CM

## 2023-10-02 LAB
25(OH)D3 SERPL-MCNC: 54.2 NG/ML (ref 30–100)
ALBUMIN SERPL BCP-MCNC: 4.4 G/DL (ref 3.5–5)
ALP SERPL-CCNC: 57 U/L (ref 34–104)
ALT SERPL W P-5'-P-CCNC: 19 U/L (ref 7–52)
ANION GAP SERPL CALCULATED.3IONS-SCNC: 7 MMOL/L
AST SERPL W P-5'-P-CCNC: 22 U/L (ref 13–39)
B BURGDOR IGG+IGM SER QL IA: NEGATIVE
BASOPHILS # BLD AUTO: 0.08 THOUSANDS/ÂΜL (ref 0–0.1)
BASOPHILS NFR BLD AUTO: 1 % (ref 0–1)
BILIRUB SERPL-MCNC: 0.63 MG/DL (ref 0.2–1)
BUN SERPL-MCNC: 15 MG/DL (ref 5–25)
CALCIUM SERPL-MCNC: 9.5 MG/DL (ref 8.4–10.2)
CHLORIDE SERPL-SCNC: 104 MMOL/L (ref 96–108)
CHOLEST SERPL-MCNC: 264 MG/DL
CO2 SERPL-SCNC: 28 MMOL/L (ref 21–32)
CREAT SERPL-MCNC: 0.79 MG/DL (ref 0.6–1.3)
EOSINOPHIL # BLD AUTO: 0.19 THOUSAND/ÂΜL (ref 0–0.61)
EOSINOPHIL NFR BLD AUTO: 3 % (ref 0–6)
ERYTHROCYTE [DISTWIDTH] IN BLOOD BY AUTOMATED COUNT: 13 % (ref 11.6–15.1)
EST. AVERAGE GLUCOSE BLD GHB EST-MCNC: 123 MG/DL
GFR SERPL CREATININE-BSD FRML MDRD: 77 ML/MIN/1.73SQ M
GLUCOSE P FAST SERPL-MCNC: 116 MG/DL (ref 65–99)
HBA1C MFR BLD: 5.9 %
HCT VFR BLD AUTO: 43.4 % (ref 34.8–46.1)
HDLC SERPL-MCNC: 80 MG/DL
HGB BLD-MCNC: 14.1 G/DL (ref 11.5–15.4)
IMM GRANULOCYTES # BLD AUTO: 0.02 THOUSAND/UL (ref 0–0.2)
IMM GRANULOCYTES NFR BLD AUTO: 0 % (ref 0–2)
LDLC SERPL CALC-MCNC: 170 MG/DL (ref 0–100)
LYMPHOCYTES # BLD AUTO: 2.03 THOUSANDS/ÂΜL (ref 0.6–4.47)
LYMPHOCYTES NFR BLD AUTO: 35 % (ref 14–44)
MCH RBC QN AUTO: 30.5 PG (ref 26.8–34.3)
MCHC RBC AUTO-ENTMCNC: 32.5 G/DL (ref 31.4–37.4)
MCV RBC AUTO: 94 FL (ref 82–98)
MONOCYTES # BLD AUTO: 0.51 THOUSAND/ÂΜL (ref 0.17–1.22)
MONOCYTES NFR BLD AUTO: 9 % (ref 4–12)
NEUTROPHILS # BLD AUTO: 2.97 THOUSANDS/ÂΜL (ref 1.85–7.62)
NEUTS SEG NFR BLD AUTO: 52 % (ref 43–75)
NRBC BLD AUTO-RTO: 0 /100 WBCS
PLATELET # BLD AUTO: 271 THOUSANDS/UL (ref 149–390)
PMV BLD AUTO: 11.5 FL (ref 8.9–12.7)
POTASSIUM SERPL-SCNC: 4.6 MMOL/L (ref 3.5–5.3)
PROT SERPL-MCNC: 7.4 G/DL (ref 6.4–8.4)
RBC # BLD AUTO: 4.63 MILLION/UL (ref 3.81–5.12)
SODIUM SERPL-SCNC: 139 MMOL/L (ref 135–147)
TRIGL SERPL-MCNC: 69 MG/DL
TSH SERPL DL<=0.05 MIU/L-ACNC: 1.77 UIU/ML (ref 0.45–4.5)
WBC # BLD AUTO: 5.8 THOUSAND/UL (ref 4.31–10.16)

## 2023-10-02 PROCEDURE — 86618 LYME DISEASE ANTIBODY: CPT

## 2023-10-02 PROCEDURE — 84443 ASSAY THYROID STIM HORMONE: CPT

## 2023-10-02 PROCEDURE — 36415 COLL VENOUS BLD VENIPUNCTURE: CPT

## 2023-10-02 PROCEDURE — 80061 LIPID PANEL: CPT

## 2023-10-02 PROCEDURE — 82306 VITAMIN D 25 HYDROXY: CPT

## 2023-10-02 PROCEDURE — 80053 COMPREHEN METABOLIC PANEL: CPT

## 2023-10-02 PROCEDURE — 85025 COMPLETE CBC W/AUTO DIFF WBC: CPT

## 2023-10-02 PROCEDURE — 99214 OFFICE O/P EST MOD 30 MIN: CPT | Performed by: INTERNAL MEDICINE

## 2023-10-02 PROCEDURE — 83036 HEMOGLOBIN GLYCOSYLATED A1C: CPT

## 2023-10-02 PROCEDURE — G0439 PPPS, SUBSEQ VISIT: HCPCS | Performed by: INTERNAL MEDICINE

## 2023-10-02 RX ORDER — LORAZEPAM 0.5 MG/1
0.5 TABLET ORAL DAILY PRN
Qty: 30 TABLET | Refills: 0 | Status: SHIPPED | OUTPATIENT
Start: 2023-10-02

## 2023-10-02 RX ORDER — BENAZEPRIL HYDROCHLORIDE 40 MG/1
40 TABLET, FILM COATED ORAL DAILY
Qty: 90 TABLET | Refills: 3 | Status: SHIPPED | OUTPATIENT
Start: 2023-10-02

## 2023-10-02 NOTE — PROGRESS NOTES
Assessment and Plan:     Problem List Items Addressed This Visit        Cardiovascular and Mediastinum    Hypertension    Relevant Medications    benazepril (LOTENSIN) 40 MG tablet    Other Relevant Orders    CBC and differential    Comprehensive metabolic panel    TSH, 3rd generation with Free T4 reflex       Musculoskeletal and Integument    LP (lichen planus)       Other    Hyperlipidemia    Relevant Orders    Hemoglobin A1C    Lipid Panel with Direct LDL reflex    Vitamin D deficiency    Relevant Orders    Vitamin D 25 hydroxy   Other Visit Diagnoses     Medicare annual wellness visit, subsequent    -  Primary    Fall, subsequent encounter        Post-menopausal        Relevant Orders    DXA bone density spine hip and pelvis    Screening mammogram for breast cancer        Relevant Orders    Mammo screening bilateral w 3d & cad    Abnormal finding of blood chemistry, unspecified        Relevant Orders    Hemoglobin A1C    Pain in both lower extremities        Relevant Orders    Lyme Disease Serology w/Reflex    Exostosis            BMI Counseling: Body mass index is 29.51 kg/m². The BMI is above normal. Nutrition recommendations include decreasing portion sizes and encouraging healthy choices of fruits and vegetables. Exercise recommendations include moderate physical activity 150 minutes/week. Rationale for BMI follow-up plan is due to patient being overweight or obese. Urinary Incontinence Plan of Care: counseling topics discussed: practice Kegel (pelvic floor strengthening) exercises, limit alcohol, caffeine, spicy foods, and acidic foods, limiting fluid intake 3-4 hours before bed and preventing constipation. Risks and benefits of pharmacotherapy was reviewed and patient was prescribed Vaginal estrogen. Preventive health issues were discussed with patient, and age appropriate screening tests were ordered as noted in patient's After Visit Summary.   Personalized health advice and appropriate referrals for health education or preventive services given if needed, as noted in patient's After Visit Summary. History of Present Illness:     Patient presents for a Medicare Wellness Visit    Very pleasant 27-year-old female here for Medicare well which has been completed. We reviewed her chronic medical conditions, she is due for lab work, ordered CBC, CMP, TSH, A1c, lipid. Also due for mammogram which is scheduled, due for DEXA scan which has been ordered today. Check vitamin D. Few issues, had a few falls that she says are mechanical.  Obtain DEXA scan. Check vitamin D. Reports some sadness as she is currently grieving her dog's death. Provided emotional support. She stopped using Lexapro because of dry mouth. Would prefer to use as needed Ativan which is okay. We will send it today. B/l ankle edema, reassured patient that is not the heart or the lungs or liver. Likely amlodipine. We will stop amlodipine. Increase benazepril. Urinary urgency, she prefers to hold off on any intervention. Discussed other ways to decrease symptoms. Chronic pain and arthritis; continue ortho f/u. Continue Celebrex; check CMP. Patient Care Team:  Jesica Grubbs MD as PCP - General (Internal Medicine)  Anthony Palma DO as PCP - PCP-[facility] Attributed-Roster     Review of Systems:     Review of Systems   Constitutional: Negative for chills and fever. HENT: Negative for ear pain and sore throat. Dry mouth   Eyes: Negative for pain and visual disturbance. Respiratory: Negative for cough and shortness of breath. Cardiovascular: Negative for chest pain and palpitations. Gastrointestinal: Negative for abdominal pain and vomiting. Genitourinary: Positive for urgency. Negative for dysuria and hematuria. Musculoskeletal: Positive for arthralgias and gait problem. Negative for back pain. Skin: Negative for color change and rash.    Neurological: Negative for seizures and syncope. Psychiatric/Behavioral: Positive for dysphoric mood. All other systems reviewed and are negative.        Problem List:     Patient Active Problem List   Diagnosis   • Allergic rhinitis, seasonal   • Other anaphylactic reaction   • Anxiety disorder   • Atopic dermatitis   • Claustrophobia   • Depression   • Esophageal reflux   • Fibrocystic breast disease   • Herpes simplex infection   • Herpetic gingivostomatitis   • Hyperlipidemia   • Hypertension   • LP (lichen planus)   • Ophthalmoplegic migraine headache   • Raynaud's disease, idiopathic   • Vitamin D deficiency   • Allergy to bee sting   • Deformity of toenail   • Encounter for hepatitis C screening test for low risk patient   • Chronic pain of both knees      Past Medical and Surgical History:     Past Medical History:   Diagnosis Date   • Abnormal mammogram     last assessed-8/12/2014   • Allergic    • Arthritis    • Chest pain     last assessed-6/17/2013-non cardiac   • Chronic maxillary sinusitis     last assessed-11/10/2015   • COPD (chronic obstructive pulmonary disease) (HCC)    • Fatty liver     last assessed-6/11/2014   • GERD (gastroesophageal reflux disease)    • Hypertension    • Lichen planus    • Lyme disease     last assessed-6/17/2013   • Raynaud's disease    • Tick bites     last assessed-6/18/2013     Past Surgical History:   Procedure Laterality Date   • ABDOMINAL SURGERY     • EYE SURGERY Left     childhood      Family History:     Family History   Problem Relation Age of Onset   • Coronary artery disease Father    • Cancer Father    • Lung cancer Father    • Cancer Maternal Aunt    • Breast cancer Maternal Aunt       Social History:     Social History     Socioeconomic History   • Marital status: /Civil Union     Spouse name: None   • Number of children: None   • Years of education: None   • Highest education level: None   Occupational History   • None   Tobacco Use   • Smoking status: Never   • Smokeless tobacco: Never   Vaping Use   • Vaping Use: Never used   Substance and Sexual Activity   • Alcohol use: Yes     Alcohol/week: 2.0 standard drinks of alcohol     Types: 2 Glasses of wine per week   • Drug use: No   • Sexual activity: Not Currently     Partners: Male     Birth control/protection: Abstinence   Other Topics Concern   • None   Social History Narrative    Caffeine use - 2 cups coffee/day                         - 2 cups green tea/day     Social Determinants of Health     Financial Resource Strain: Low Risk  (10/2/2023)    Overall Financial Resource Strain (CARDIA)    • Difficulty of Paying Living Expenses: Not hard at all   Food Insecurity: Not on file   Transportation Needs: No Transportation Needs (10/2/2023)    PRAPARE - Transportation    • Lack of Transportation (Medical): No    • Lack of Transportation (Non-Medical):  No   Physical Activity: Not on file   Stress: Not on file   Social Connections: Not on file   Intimate Partner Violence: Not on file   Housing Stability: Not on file      Medications and Allergies:     Current Outpatient Medications   Medication Sig Dispense Refill   • albuterol (ProAir HFA) 90 mcg/act inhaler Inhale 2 puffs every 6 (six) hours as needed for wheezing 8.5 g 0   • ascorbic acid (VITAMIN C) 500 mg tablet Take 500 mg by mouth     • B Complex-Biotin-FA (HM VITAMIN B100 COMPLEX) TABS Take by mouth     • benazepril (LOTENSIN) 40 MG tablet Take 1 tablet (40 mg total) by mouth daily 90 tablet 3   • betamethasone valerate (VALISONE) 0.1 % cream APPLY TO AFFECTED AREA TOPICALLY EVERY DAY 30 g 1   • Calcium Carb-Cholecalciferol 600-800 MG-UNIT TABS Take 1 tablet by mouth     • celecoxib (CeleBREX) 200 mg capsule TAKE 1 CAPSULE BY MOUTH EVERY DAY 90 capsule 1   • clobetasol (TEMOVATE) 0.05 % cream APPLY TOPICALLY TO AFFECTED AREA 2 X PER DAY, ARMS/LEGS FOR UP TO 2 WEEKS THEN 2-3 DAYS/WEEK 30 g 5   • EPINEPHrine (EPIPEN) 0.3 mg/0.3 mL SOAJ Inject as directed     • ESTRACE VAGINAL 0.1 MG/GM vaginal cream ONE GRAM PER VAGINA TWICE WEEKLY 42.5 g 0   • Multiple Vitamin (MULTI-VITAMIN DAILY) TABS Take by mouth     • omeprazole (PriLOSEC) 20 mg delayed release capsule Take 1 capsule (20 mg total) by mouth daily 90 capsule 3   • Turmeric 500 MG CAPS Take by mouth     • valACYclovir (VALTREX) 500 mg tablet Take 1 tablet (500 mg total) by mouth daily 90 tablet 3     No current facility-administered medications for this visit. Allergies   Allergen Reactions   • Bee Venom Anaphylaxis     REQUIRES EPI PEN  REQUIRES EPI PEN      Immunizations:     Immunization History   Administered Date(s) Administered   • DT (pediatric) 02/07/2001   • H1N1, All Formulations 11/21/2009   • INFLUENZA 11/18/2008, 10/05/2010, 11/02/2011, 10/26/2017, 09/14/2018, 09/26/2022   • Influenza Quadrivalent, 6-35 Months IM 11/10/2015   • Influenza, high dose seasonal 0.7 mL 09/26/2022   • Influenza, injectable, quadrivalent, preservative free 0.5 mL 09/14/2018, 10/22/2019   • Influenza, seasonal, injectable 11/18/2008, 10/05/2010, 11/02/2011, 10/02/2012, 10/20/2014, 10/26/2017   • TD (adult) Preservative Free 02/08/2013   • Td (adult), adsorbed 02/08/2013   • Tdap 08/21/2014, 07/21/2017      Health Maintenance:         Topic Date Due   • Breast Cancer Screening: Mammogram  12/02/2023   • Cervical Cancer Screening  03/21/2024 (Originally 7/29/2022)   • Colorectal Cancer Screening  07/28/2026   • Hepatitis C Screening  Completed         Topic Date Due   • COVID-19 Vaccine (1) Never done   • Pneumococcal Vaccine: 65+ Years (1 - PCV) Never done   • Influenza Vaccine (1) 09/01/2023      Medicare Screening Tests and Risk Assessments:     Kameron Appiah is here for her Subsequent Wellness visit. Last Medicare Wellness visit information reviewed, patient interviewed and updates made to the record today. Health Risk Assessment:   Patient rates overall health as good. Patient feels that their physical health rating is same.  Patient is satisfied with their life. Eyesight was rated as slightly worse. Hearing was rated as same. Patient feels that their emotional and mental health rating is same. Patients states they are sometimes angry. Patient states they are sometimes unusually tired/fatigued. Pain experienced in the last 7 days has been some. Patient's pain rating has been 5/10. Patient states that she has experienced no weight loss or gain in last 6 months. Depression Screening:   PHQ-9 Score: 0      Fall Risk Screening: In the past year, patient has experienced: history of falling in past year    Number of falls: 1  Injured during fall?: No    Feels unsteady when standing or walking?: No    Worried about falling?: No      Urinary Incontinence Screening:   Patient has leaked urine accidently in the last six months. Home Safety:  Patient has trouble with stairs inside or outside of their home. Patient has working smoke alarms and has working carbon monoxide detector. Home safety hazards include: none. Nutrition:   Current diet is Regular. Medications:   Patient is currently taking over-the-counter supplements. OTC medications include: see medication list. Patient is able to manage medications. Activities of Daily Living (ADLs)/Instrumental Activities of Daily Living (IADLs):   Walk and transfer into and out of bed and chair?: Yes  Dress and groom yourself?: Yes    Bathe or shower yourself?: Yes    Feed yourself?  Yes  Do your laundry/housekeeping?: Yes  Manage your money, pay your bills and track your expenses?: Yes  Make your own meals?: Yes    Do your own shopping?: Yes    Previous Hospitalizations:   Any hospitalizations or ED visits within the last 12 months?: Yes    How many hospitalizations have you had in the last year?: 1-2    Advance Care Planning:   Living will: No    Advanced directive: No      Cognitive Screening:   Provider or family/friend/caregiver concerned regarding cognition?: No    PREVENTIVE SCREENINGS Cardiovascular Screening:    General: History Lipid Disorder and Risks and Benefits Discussed    Due for: Lipid Panel      Diabetes Screening:     General: Risks and Benefits Discussed    Due for: Blood Glucose      Colorectal Cancer Screening:     General: Screening Current      Breast Cancer Screening:     General: Risks and Benefits Discussed    Due for: Mammogram        Cervical Cancer Screening:    General: Screening Not Indicated      Osteoporosis Screening:    General: Risks and Benefits Discussed    Due for: Bone Density Ultrasound      Abdominal Aortic Aneurysm (AAA) Screening:        General: Screening Not Indicated      Lung Cancer Screening:     General: Screening Not Indicated      Hepatitis C Screening:    General: Screening Current    Screening, Brief Intervention, and Referral to Treatment (SBIRT)    Screening  Typical number of drinks in a day: 0  Typical number of drinks in a week: 2  Interpretation: Low risk drinking behavior. Single Item Drug Screening:  How often have you used an illegal drug (including marijuana) or a prescription medication for non-medical reasons in the past year? never    Single Item Drug Screen Score: 0  Interpretation: Negative screen for possible drug use disorder    No results found. Physical Exam:     /80 (BP Location: Right arm, Patient Position: Sitting, Cuff Size: Adult)   Pulse 65   Ht 5' 7" (1.702 m)   Wt 85.5 kg (188 lb 6.4 oz)   SpO2 99%   BMI 29.51 kg/m²     Physical Exam  Vitals and nursing note reviewed. Constitutional:       General: She is not in acute distress. Appearance: Normal appearance. She is well-developed. HENT:      Head: Normocephalic and atraumatic. Eyes:      Conjunctiva/sclera: Conjunctivae normal.   Cardiovascular:      Rate and Rhythm: Normal rate and regular rhythm. Heart sounds: No murmur heard. Pulmonary:      Effort: Pulmonary effort is normal. No respiratory distress.       Breath sounds: Normal breath sounds. Abdominal:      Palpations: Abdomen is soft. Tenderness: There is no abdominal tenderness. Musculoskeletal:         General: Tenderness present. No swelling. Cervical back: Neck supple. Right lower leg: Edema present. Left lower leg: Edema present. Skin:     General: Skin is warm and dry. Capillary Refill: Capillary refill takes less than 2 seconds. Neurological:      General: No focal deficit present. Mental Status: She is alert and oriented to person, place, and time. Mental status is at baseline.       Gait: Gait normal.   Psychiatric:         Mood and Affect: Mood normal.          Judy Craig MD

## 2023-10-02 NOTE — PATIENT INSTRUCTIONS
Medicare Preventive Visit Patient Instructions  Thank you for completing your Welcome to Medicare Visit or Medicare Annual Wellness Visit today. Your next wellness visit will be due in one year (10/2/2024). The screening/preventive services that you may require over the next 5-10 years are detailed below. Some tests may not apply to you based off risk factors and/or age. Screening tests ordered at today's visit but not completed yet may show as past due. Also, please note that scanned in results may not display below. Preventive Screenings:  Service Recommendations Previous Testing/Comments   Colorectal Cancer Screening  * Colonoscopy    * Fecal Occult Blood Test (FOBT)/Fecal Immunochemical Test (FIT)  * Fecal DNA/Cologuard Test  * Flexible Sigmoidoscopy Age: 43-73 years old   Colonoscopy: every 10 years (may be performed more frequently if at higher risk)  OR  FOBT/FIT: every 1 year  OR  Cologuard: every 3 years  OR  Sigmoidoscopy: every 5 years  Screening may be recommended earlier than age 39 if at higher risk for colorectal cancer. Also, an individualized decision between you and your healthcare provider will decide whether screening between the ages of 77-80 would be appropriate. Colonoscopy: 07/27/2023  FOBT/FIT: Not on file  Cologuard: 07/28/2023  Sigmoidoscopy: Not on file    Screening Current     Breast Cancer Screening Age: 36 years old  Frequency: every 1-2 years  Not required if history of left and right mastectomy Mammogram: 12/02/2022    Screening Current   Cervical Cancer Screening Between the ages of 21-29, pap smear recommended once every 3 years. Between the ages of 32-69, can perform pap smear with HPV co-testing every 5 years.    Recommendations may differ for women with a history of total hysterectomy, cervical cancer, or abnormal pap smears in past. Pap Smear: 07/29/2019    Screening Not Indicated   Hepatitis C Screening Once for adults born between 1945 and 1965  More frequently in patients at high risk for Hepatitis C Hep C Antibody: 07/21/2021    Screening Current   Diabetes Screening 1-2 times per year if you're at risk for diabetes or have pre-diabetes Fasting glucose: 105 mg/dL (10/7/2022)  A1C: 5.6 % (10/7/2022)  Screening Current   Cholesterol Screening Once every 5 years if you don't have a lipid disorder. May order more often based on risk factors. Lipid panel: 10/07/2022    Screening Not Indicated  History Lipid Disorder     Other Preventive Screenings Covered by Medicare:  1. Abdominal Aortic Aneurysm (AAA) Screening: covered once if your at risk. You're considered to be at risk if you have a family history of AAA. 2. Lung Cancer Screening: covers low dose CT scan once per year if you meet all of the following conditions: (1) Age 48-67; (2) No signs or symptoms of lung cancer; (3) Current smoker or have quit smoking within the last 15 years; (4) You have a tobacco smoking history of at least 20 pack years (packs per day multiplied by number of years you smoked); (5) You get a written order from a healthcare provider. 3. Glaucoma Screening: covered annually if you're considered high risk: (1) You have diabetes OR (2) Family history of glaucoma OR (3)  aged 48 and older OR (3)  American aged 72 and older  3. Osteoporosis Screening: covered every 2 years if you meet one of the following conditions: (1) You're estrogen deficient and at risk for osteoporosis based off medical history and other findings; (2) Have a vertebral abnormality; (3) On glucocorticoid therapy for more than 3 months; (4) Have primary hyperparathyroidism; (5) On osteoporosis medications and need to assess response to drug therapy. · Last bone density test (DXA Scan): Not on file. 5. HIV Screening: covered annually if you're between the age of 14-79. Also covered annually if you are younger than 13 and older than 72 with risk factors for HIV infection.  For pregnant patients, it is covered up to 3 times per pregnancy. Immunizations:  Immunization Recommendations   Influenza Vaccine Annual influenza vaccination during flu season is recommended for all persons aged >= 6 months who do not have contraindications   Pneumococcal Vaccine   * Pneumococcal conjugate vaccine = PCV13 (Prevnar 13), PCV15 (Vaxneuvance), PCV20 (Prevnar 20)  * Pneumococcal polysaccharide vaccine = PPSV23 (Pneumovax) Adults 20-63 years old: 1-3 doses may be recommended based on certain risk factors  Adults 72 years old: 1-2 doses may be recommended based off what pneumonia vaccine you previously received   Hepatitis B Vaccine 3 dose series if at intermediate or high risk (ex: diabetes, end stage renal disease, liver disease)   Tetanus (Td) Vaccine - COST NOT COVERED BY MEDICARE PART B Following completion of primary series, a booster dose should be given every 10 years to maintain immunity against tetanus. Td may also be given as tetanus wound prophylaxis. Tdap Vaccine - COST NOT COVERED BY MEDICARE PART B Recommended at least once for all adults. For pregnant patients, recommended with each pregnancy. Shingles Vaccine (Shingrix) - COST NOT COVERED BY MEDICARE PART B  2 shot series recommended in those aged 48 and above     Health Maintenance Due:      Topic Date Due   • Breast Cancer Screening: Mammogram  12/02/2023   • Cervical Cancer Screening  03/21/2024 (Originally 7/29/2022)   • Colorectal Cancer Screening  07/28/2026   • Hepatitis C Screening  Completed     Immunizations Due:      Topic Date Due   • COVID-19 Vaccine (1) Never done   • Pneumococcal Vaccine: 65+ Years (1 - PCV) Never done   • Influenza Vaccine (1) 09/01/2023     Advance Directives   What are advance directives? Advance directives are legal documents that state your wishes and plans for medical care. These plans are made ahead of time in case you lose your ability to make decisions for yourself.  Advance directives can apply to any medical decision, such as the treatments you want, and if you want to donate organs. What are the types of advance directives? There are many types of advance directives, and each state has rules about how to use them. You may choose a combination of any of the following:  · Living will: This is a written record of the treatment you want. You can also choose which treatments you do not want, which to limit, and which to stop at a certain time. This includes surgery, medicine, IV fluid, and tube feedings. · Durable power of  for [facility]): This is a written record that states who you want to make healthcare choices for you when you are unable to make them for yourself. This person, called a proxy, is usually a family member or a friend. You may choose more than 1 proxy. · Do not resuscitate (DNR) order:  A DNR order is used in case your heart stops beating or you stop breathing. It is a request not to have certain forms of treatment, such as CPR. A DNR order may be included in other types of advance directives. · Medical directive: This covers the care that you want if you are in a coma, near death, or unable to make decisions for yourself. You can list the treatments you want for each condition. Treatment may include pain medicine, surgery, blood transfusions, dialysis, IV or tube feedings, and a ventilator (breathing machine). · Values history: This document has questions about your views, beliefs, and how you feel and think about life. This information can help others choose the care that you would choose. Why are advance directives important? An advance directive helps you control your care. Although spoken wishes may be used, it is better to have your wishes written down. Spoken wishes can be misunderstood, or not followed. Treatments may be given even if you do not want them. An advance directive may make it easier for your family to make difficult choices about your care.    Fall Prevention    Fall prevention  includes ways to make your home and other areas safer. It also includes ways you can move more carefully to prevent a fall. Health conditions that cause changes in your blood pressure, vision, or muscle strength and coordination may increase your risk for falls. Medicines may also increase your risk for falls if they make you dizzy, weak, or sleepy. Fall prevention tips:   · Stand or sit up slowly. · Use assistive devices as directed. · Wear shoes that fit well and have soles that . · Wear a personal alarm. · Stay active. · Manage your medical conditions. Home Safety Tips:  · Add items to prevent falls in the bathroom. · Keep paths clear. · Install bright lights in your home. · Keep items you use often on shelves within reach. · Paint or place reflective tape on the edges of your stairs. Urinary Incontinence   Urinary incontinence (UI)  is when you lose control of your bladder. UI develops because your bladder cannot store or empty urine properly. The 3 most common types of UI are stress incontinence, urge incontinence, or both. Medicines:   · May be given to help strengthen your bladder control. Report any side effects of medication to your healthcare provider. Do pelvic muscle exercises often:  Your pelvic muscles help you stop urinating. Squeeze these muscles tight for 5 seconds, then relax for 5 seconds. Gradually work up to squeezing for 10 seconds. Do 3 sets of 15 repetitions a day, or as directed. This will help strengthen your pelvic muscles and improve bladder control. Train your bladder:  Go to the bathroom at set times, such as every 2 hours, even if you do not feel the urge to go. You can also try to hold your urine when you feel the urge to go. For example, hold your urine for 5 minutes when you feel the urge to go. As that becomes easier, hold your urine for 10 minutes. Self-care:   · Keep a UI record.   Write down how often you leak urine and how much you leak. Make a note of what you were doing when you leaked urine. · Drink liquids as directed. You may need to limit the amount of liquid you drink to help control your urine leakage. Do not drink any liquid right before you go to bed. Limit or do not have drinks that contain caffeine or alcohol. · Prevent constipation. Eat a variety of high-fiber foods. Good examples are high-fiber cereals, beans, vegetables, and whole-grain breads. Walking is the best way to trigger your intestines to have a bowel movement. · Exercise regularly and maintain a healthy weight. Weight loss and exercise will decrease pressure on your bladder and help you control your leakage. · Use a catheter as directed  to help empty your bladder. A catheter is a tiny, plastic tube that is put into your bladder to drain your urine. · Go to behavior therapy as directed. Behavior therapy may be used to help you learn to control your urge to urinate. Weight Management   Why it is important to manage your weight:  Being overweight increases your risk of health conditions such as heart disease, high blood pressure, type 2 diabetes, and certain types of cancer. It can also increase your risk for osteoarthritis, sleep apnea, and other respiratory problems. Aim for a slow, steady weight loss. Even a small amount of weight loss can lower your risk of health problems. How to lose weight safely:  A safe and healthy way to lose weight is to eat fewer calories and get regular exercise. You can lose up about 1 pound a week by decreasing the number of calories you eat by 500 calories each day. Healthy meal plan for weight management:  A healthy meal plan includes a variety of foods, contains fewer calories, and helps you stay healthy. A healthy meal plan includes the following:  · Eat whole-grain foods more often. A healthy meal plan should contain fiber.  Fiber is the part of grains, fruits, and vegetables that is not broken down by your body. Whole-grain foods are healthy and provide extra fiber in your diet. Some examples of whole-grain foods are whole-wheat breads and pastas, oatmeal, brown rice, and bulgur. · Eat a variety of vegetables every day. Include dark, leafy greens such as spinach, kale, kasi greens, and mustard greens. Eat yellow and orange vegetables such as carrots, sweet potatoes, and winter squash. · Eat a variety of fruits every day. Choose fresh or canned fruit (canned in its own juice or light syrup) instead of juice. Fruit juice has very little or no fiber. · Eat low-fat dairy foods. Drink fat-free (skim) milk or 1% milk. Eat fat-free yogurt and low-fat cottage cheese. Try low-fat cheeses such as mozzarella and other reduced-fat cheeses. · Choose meat and other protein foods that are low in fat. Choose beans or other legumes such as split peas or lentils. Choose fish, skinless poultry (chicken or turkey), or lean cuts of red meat (beef or pork). Before you cook meat or poultry, cut off any visible fat. · Use less fat and oil. Try baking foods instead of frying them. Add less fat, such as margarine, sour cream, regular salad dressing and mayonnaise to foods. Eat fewer high-fat foods. Some examples of high-fat foods include french fries, doughnuts, ice cream, and cakes. · Eat fewer sweets. Limit foods and drinks that are high in sugar. This includes candy, cookies, regular soda, and sweetened drinks. Exercise:  Exercise at least 30 minutes per day on most days of the week. Some examples of exercise include walking, biking, dancing, and swimming. You can also fit in more physical activity by taking the stairs instead of the elevator or parking farther away from stores. Ask your healthcare provider about the best exercise plan for you. © Copyright [employer] 2018 Information is for End User's use only and may not be sold, redistributed or otherwise used for commercial purposes.  All illustrations and images included in CareNotes® are the copyrighted property of [employer] or [address] [place] [address]

## 2023-10-03 ENCOUNTER — TELEPHONE (OUTPATIENT)
Dept: INTERNAL MEDICINE CLINIC | Facility: CLINIC | Age: 68
End: 2023-10-03

## 2023-10-18 DIAGNOSIS — B00.9 HERPES SIMPLEX INFECTION: ICD-10-CM

## 2023-10-18 DIAGNOSIS — Z12.39 ENCOUNTER FOR OTHER SCREENING FOR MALIGNANT NEOPLASM OF BREAST: ICD-10-CM

## 2023-10-18 DIAGNOSIS — L43.9 LP (LICHEN PLANUS): ICD-10-CM

## 2023-10-18 DIAGNOSIS — Z12.11 ENCOUNTER FOR SCREENING COLONOSCOPY: ICD-10-CM

## 2023-10-18 DIAGNOSIS — E55.9 VITAMIN D DEFICIENCY: ICD-10-CM

## 2023-10-18 DIAGNOSIS — K21.9 GASTROESOPHAGEAL REFLUX DISEASE WITHOUT ESOPHAGITIS: ICD-10-CM

## 2023-10-18 DIAGNOSIS — I10 HYPERTENSION, UNSPECIFIED TYPE: ICD-10-CM

## 2023-10-18 DIAGNOSIS — R73.09 ELEVATED GLUCOSE: ICD-10-CM

## 2023-10-18 DIAGNOSIS — K21.9 GASTROESOPHAGEAL REFLUX DISEASE: ICD-10-CM

## 2023-10-18 DIAGNOSIS — L20.9 ATOPIC DERMATITIS, UNSPECIFIED TYPE: ICD-10-CM

## 2023-10-18 DIAGNOSIS — E78.5 HYPERLIPIDEMIA, UNSPECIFIED HYPERLIPIDEMIA TYPE: ICD-10-CM

## 2023-10-18 RX ORDER — OMEPRAZOLE 20 MG/1
20 CAPSULE, DELAYED RELEASE ORAL DAILY
Qty: 90 CAPSULE | Refills: 3 | Status: SHIPPED | OUTPATIENT
Start: 2023-10-18

## 2023-11-11 ENCOUNTER — OFFICE VISIT (OUTPATIENT)
Dept: URGENT CARE | Facility: CLINIC | Age: 68
End: 2023-11-11
Payer: MEDICARE

## 2023-11-11 VITALS
OXYGEN SATURATION: 98 % | HEART RATE: 64 BPM | DIASTOLIC BLOOD PRESSURE: 88 MMHG | TEMPERATURE: 97.6 F | HEIGHT: 67 IN | BODY MASS INDEX: 29.51 KG/M2 | SYSTOLIC BLOOD PRESSURE: 138 MMHG | RESPIRATION RATE: 16 BRPM | WEIGHT: 188 LBS

## 2023-11-11 DIAGNOSIS — S20.362A TICK BITE OF LEFT FRONT WALL OF THORAX, INITIAL ENCOUNTER: Primary | ICD-10-CM
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
## 2023-11-11 PROCEDURE — 99213 OFFICE O/P EST LOW 20 MIN: CPT

## 2023-11-11 PROCEDURE — G0463 HOSPITAL OUTPT CLINIC VISIT: HCPCS

## 2023-11-11 RX ORDER — DOXYCYCLINE 100 MG/1
200 TABLET ORAL ONCE
Qty: 2 TABLET | Refills: 0 | Status: SHIPPED | OUTPATIENT
Start: 2023-11-11 | End: 2023-11-11

## 2023-11-11 NOTE — PATIENT INSTRUCTIONS
Take 200mg Doxycycline once for prophylaxis. Use Debrox which you can over the counter to soften up the ear wax in the ear and allow it to more gently and safely come out of your ear. Follow up with PCP in 3-5 days if not improving. Proceed to ER if symptoms worsen.

## 2023-11-11 NOTE — PROGRESS NOTES
[facility]        NAME: Awilda Gamez is a 76 y.o. female  : 1955    MRN: [id number]  DATE: 2023  TIME: 8:53 AM    Assessment and Plan   Tick bite of left front wall of thorax, initial encounter [S20.362A, W57. XXXA]  1. Tick bite of left front wall of thorax, initial encounter  doxycycline (ADOXA) 100 MG tablet            Patient Instructions   Take 200mg Doxycycline once for prophylaxis. Use Debrox which you can over the counter to soften up the ear wax in the ear and allow it to more gently and safely come out of your ear. Follow up with PCP in 3-5 days if not improving. Proceed to ER if symptoms worsen. Chief Complaint     Chief Complaint   Patient presents with   • Insect Bite     2 days ago, tick bite, on left side under arm. Like you to look at right ear too. History of Present Illness       Was leaf blowing 2 days ago and felt a tick on her left chest wall. States unsure how long it was there but [place]. Also wants right ear looked at, was told there was wax. Review of Systems   Review of Systems   Constitutional:  Negative for chills and fever. HENT:  Negative for ear pain. Skin:  Positive for wound. Neurological:  Negative for dizziness, weakness and light-headedness.          Current Medications       Current Outpatient Medications:   •  albuterol (ProAir HFA) 90 mcg/act inhaler, Inhale 2 puffs every 6 (six) hours as needed for wheezing, Disp: 8.5 g, Rfl: 0  •  ascorbic acid (VITAMIN C) 500 mg tablet, Take 500 mg by mouth, Disp: , Rfl:   •  B Complex-Biotin-FA (HM VITAMIN B100 COMPLEX) TABS, Take by mouth, Disp: , Rfl:   •  benazepril (LOTENSIN) 40 MG tablet, Take 1 tablet (40 mg total) by mouth daily, Disp: 90 tablet, Rfl: 3  •  betamethasone valerate (VALISONE) 0.1 % cream, APPLY TO AFFECTED AREA TOPICALLY EVERY DAY, Disp: 30 g, Rfl: 1  •  Calcium Carb-Cholecalciferol 600-800 MG-UNIT TABS, Take 1 tablet by mouth, Disp: , Rfl:   •  celecoxib (CeleBREX) 200 mg capsule, TAKE 1 CAPSULE BY MOUTH EVERY DAY, Disp: 90 capsule, Rfl: 1  •  clobetasol (TEMOVATE) 0.05 % cream, APPLY TOPICALLY TO AFFECTED AREA 2 X PER DAY, ARMS/LEGS FOR UP TO 2 WEEKS THEN 2-3 DAYS/WEEK, Disp: 30 g, Rfl: 5  •  doxycycline (ADOXA) 100 MG tablet, Take 2 tablets (200 mg total) by mouth 1 (one) time for 1 dose, Disp: 2 tablet, Rfl: 0  •  EPINEPHrine (EPIPEN) 0.3 mg/0.3 mL SOAJ, Inject as directed, Disp: , Rfl:   •  ESTRACE VAGINAL 0.1 MG/GM vaginal cream, ONE GRAM PER VAGINA TWICE WEEKLY, Disp: 42.5 g, Rfl: 0  •  LORazepam (Ativan) 0.5 mg tablet, Take 1 tablet (0.5 mg total) by mouth daily as needed for anxiety, Disp: 30 tablet, Rfl: 0  •  Multiple Vitamin (MULTI-VITAMIN DAILY) TABS, Take by mouth, Disp: , Rfl:   •  omeprazole (PriLOSEC) 20 mg delayed release capsule, TAKE 1 CAPSULE BY MOUTH EVERY DAY, Disp: 90 capsule, Rfl: 3  •  Turmeric 500 MG CAPS, Take by mouth, Disp: , Rfl:   •  valACYclovir (VALTREX) 500 mg tablet, Take 1 tablet (500 mg total) by mouth daily, Disp: 90 tablet, Rfl: 3    Current Allergies     Allergies as of 11/11/2023 - Reviewed 11/11/2023   Allergen Reaction Noted   • Bee venom Anaphylaxis 05/03/2009            The following portions of the patient's history were reviewed and updated as appropriate: allergies, current medications, past family history, past medical history, past social history, past surgical history and problem list.     Past Medical History:   Diagnosis Date   • Abnormal mammogram     last assessed-8/12/2014   • Allergic    • Arthritis    • Chest pain     last assessed-6/17/2013-non cardiac   • Chronic maxillary sinusitis     last assessed-11/10/2015   • COPD (chronic obstructive pulmonary disease) (HCC)    • Fatty liver     last assessed-6/11/2014   • GERD (gastroesophageal reflux disease)    • Hypertension    • Lichen planus    • Lyme disease     last assessed-6/17/2013   • Raynaud's disease    • Tick bites last assessed-6/18/2013       Past Surgical History:   Procedure Laterality Date   • ABDOMINAL SURGERY     • EYE SURGERY Left     childhood       Family History   Problem Relation Age of Onset   • Coronary artery disease Father    • Cancer Father    • Lung cancer Father    • Cancer Maternal Aunt    • Breast cancer Maternal Aunt          Medications have been verified. Objective   /88   Pulse 64   Temp 97.6 °F (36.4 °C)   Resp 16   Ht 5' 7" (1.702 m)   Wt 85.3 kg (188 lb)   SpO2 98%   BMI 29.44 kg/m²   No LMP recorded. Patient is postmenopausal.       Physical Exam     Physical Exam  Vitals and nursing note reviewed. Constitutional:       Appearance: Normal appearance. HENT:      Head: Normocephalic and atraumatic. Ears:      Comments: Cerumen right ear, not impacted. Pulmonary:      Effort: Pulmonary effort is normal.   Skin:     General: Skin is warm and dry. Capillary Refill: Capillary refill takes less than 2 seconds. Neurological:      General: No focal deficit present. Mental Status: She is alert and oriented to person, place, and time. Mental status is at baseline. Sensory: No sensory deficit. Motor: No weakness. Psychiatric:         Mood and Affect: Mood normal.         Behavior: Behavior normal.         Thought Content:  Thought content normal.

## 2023-11-19 DIAGNOSIS — L43.9 LP (LICHEN PLANUS): ICD-10-CM

## 2023-11-19 DIAGNOSIS — L20.9 ATOPIC DERMATITIS, UNSPECIFIED TYPE: ICD-10-CM

## 2023-11-20 RX ORDER — CLOBETASOL PROPIONATE 0.5 MG/G
CREAM TOPICAL
Qty: 30 G | Refills: 5 | Status: SHIPPED | OUTPATIENT
Start: 2023-11-20

## 2023-11-30 DIAGNOSIS — M25.561 CHRONIC PAIN OF BOTH KNEES: ICD-10-CM

## 2023-11-30 DIAGNOSIS — G89.29 CHRONIC PAIN OF BOTH KNEES: ICD-10-CM

## 2023-11-30 DIAGNOSIS — M25.562 CHRONIC PAIN OF BOTH KNEES: ICD-10-CM

## 2023-11-30 RX ORDER — CELECOXIB 200 MG/1
200 CAPSULE ORAL DAILY
Qty: 90 CAPSULE | Refills: 1 | Status: SHIPPED | OUTPATIENT
Start: 2023-11-30

## 2023-12-28 ENCOUNTER — HOSPITAL ENCOUNTER (EMERGENCY)
Facility: HOSPITAL | Age: 68
Discharge: HOME | End: 2023-12-28
Attending: EMERGENCY MEDICINE
Payer: MEDICARE

## 2023-12-28 ENCOUNTER — APPOINTMENT (EMERGENCY)
Dept: CT IMAGING | Facility: HOSPITAL | Age: 68
End: 2023-12-28
Payer: MEDICARE

## 2023-12-28 VITALS
OXYGEN SATURATION: 99 % | TEMPERATURE: 98.1 F | HEART RATE: 65 BPM | RESPIRATION RATE: 18 BRPM | SYSTOLIC BLOOD PRESSURE: 142 MMHG | DIASTOLIC BLOOD PRESSURE: 102 MMHG

## 2023-12-28 DIAGNOSIS — R07.89 RIGHT-SIDED CHEST WALL PAIN: ICD-10-CM

## 2023-12-28 DIAGNOSIS — R91.1 INCIDENTAL PULMONARY NODULE: ICD-10-CM
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
## 2023-12-28 LAB
ALBUMIN SERPL BCP-MCNC: 4.5 G/DL (ref 3.5–5)
ALP SERPL-CCNC: 61 U/L (ref 34–104)
ALT SERPL W P-5'-P-CCNC: 19 U/L (ref 7–52)
ANION GAP SERPL CALCULATED.3IONS-SCNC: 7 MMOL/L
AST SERPL W P-5'-P-CCNC: 20 U/L (ref 13–39)
BASOPHILS # BLD AUTO: 0.06 THOUSANDS/ÂΜL (ref 0–0.1)
BASOPHILS NFR BLD AUTO: 1 % (ref 0–1)
BILIRUB SERPL-MCNC: 0.41 MG/DL (ref 0.2–1)
BUN SERPL-MCNC: 15 MG/DL (ref 5–25)
CALCIUM SERPL-MCNC: 9.9 MG/DL (ref 8.4–10.2)
CHLORIDE SERPL-SCNC: 105 MMOL/L (ref 96–108)
CO2 SERPL-SCNC: 27 MMOL/L (ref 21–32)
CREAT SERPL-MCNC: 0.74 MG/DL (ref 0.6–1.3)
EOSINOPHIL # BLD AUTO: 0.19 THOUSAND/ÂΜL (ref 0–0.61)
EOSINOPHIL NFR BLD AUTO: 2 % (ref 0–6)
ERYTHROCYTE [DISTWIDTH] IN BLOOD BY AUTOMATED COUNT: 12.8 % (ref 11.6–15.1)
GFR SERPL CREATININE-BSD FRML MDRD: 83 ML/MIN/1.73SQ M
GLUCOSE SERPL-MCNC: 103 MG/DL (ref 65–140)
HCT VFR BLD AUTO: 41.8 % (ref 34.8–46.1)
HGB BLD-MCNC: 13.8 G/DL (ref 11.5–15.4)
IMM GRANULOCYTES # BLD AUTO: 0.04 THOUSAND/UL (ref 0–0.2)
IMM GRANULOCYTES NFR BLD AUTO: 1 % (ref 0–2)
LYMPHOCYTES # BLD AUTO: 3.09 THOUSANDS/ÂΜL (ref 0.6–4.47)
LYMPHOCYTES NFR BLD AUTO: 37 % (ref 14–44)
MCH RBC QN AUTO: 30 PG (ref 26.8–34.3)
MCHC RBC AUTO-ENTMCNC: 33 G/DL (ref 31.4–37.4)
MCV RBC AUTO: 91 FL (ref 82–98)
MONOCYTES # BLD AUTO: 0.63 THOUSAND/ÂΜL (ref 0.17–1.22)
MONOCYTES NFR BLD AUTO: 7 % (ref 4–12)
NEUTROPHILS # BLD AUTO: 4.45 THOUSANDS/ÂΜL (ref 1.85–7.62)
NEUTS SEG NFR BLD AUTO: 52 % (ref 43–75)
NRBC BLD AUTO-RTO: 0 /100 WBCS
PLATELET # BLD AUTO: 226 THOUSANDS/UL (ref 149–390)
PMV BLD AUTO: 10.1 FL (ref 8.9–12.7)
POTASSIUM SERPL-SCNC: 4.1 MMOL/L (ref 3.5–5.3)
PROT SERPL-MCNC: 7.9 G/DL (ref 6.4–8.4)
RBC # BLD AUTO: 4.6 MILLION/UL (ref 3.81–5.12)
SODIUM SERPL-SCNC: 139 MMOL/L (ref 135–147)
WBC # BLD AUTO: 8.46 THOUSAND/UL (ref 4.31–10.16)

## 2023-12-28 PROCEDURE — 85025 COMPLETE CBC W/AUTO DIFF WBC: CPT | Performed by: PHYSICIAN ASSISTANT

## 2023-12-28 PROCEDURE — 99285 EMERGENCY DEPT VISIT HI MDM: CPT | Performed by: EMERGENCY MEDICINE

## 2023-12-28 PROCEDURE — 99284 EMERGENCY DEPT VISIT MOD MDM: CPT

## 2023-12-28 PROCEDURE — 36415 COLL VENOUS BLD VENIPUNCTURE: CPT | Performed by: PHYSICIAN ASSISTANT

## 2023-12-28 PROCEDURE — G1004 CDSM NDSC: HCPCS

## 2023-12-28 PROCEDURE — 80053 COMPREHEN METABOLIC PANEL: CPT | Performed by: PHYSICIAN ASSISTANT

## 2023-12-28 PROCEDURE — 71260 CT THORAX DX C+: CPT

## 2023-12-28 PROCEDURE — 74177 CT ABD & PELVIS W/CONTRAST: CPT

## 2023-12-28 RX ORDER — OXYCODONE HYDROCHLORIDE AND ACETAMINOPHEN 5; 325 MG/1; MG/1
1 TABLET ORAL EVERY 8 HOURS PRN
Qty: 12 TABLET | Refills: 0 | Status: SHIPPED | OUTPATIENT
Start: 2023-12-28 | End: 2024-01-07

## 2023-12-28 RX ORDER — LIDOCAINE 50 MG/G
1 PATCH TOPICAL ONCE
Status: DISCONTINUED | OUTPATIENT
Start: 2023-12-28 | End: 2023-12-28 | Stop reason: HOSPADM

## 2023-12-28 RX ORDER — OXYCODONE HYDROCHLORIDE 5 MG/1
5 TABLET ORAL ONCE
Status: COMPLETED | OUTPATIENT
Start: 2023-12-28 | End: 2023-12-28

## 2023-12-28 RX ORDER — ACETAMINOPHEN 325 MG/1
975 TABLET ORAL ONCE
Status: COMPLETED | OUTPATIENT
Start: 2023-12-28 | End: 2023-12-28

## 2023-12-28 RX ORDER — METHOCARBAMOL 500 MG/1
500 TABLET, FILM COATED ORAL 2 TIMES DAILY
Qty: 20 TABLET | Refills: 0 | Status: SHIPPED | OUTPATIENT
Start: 2023-12-28

## 2023-12-28 RX ADMIN — IOHEXOL 100 ML: 350 INJECTION, SOLUTION INTRAVENOUS at 15:34

## 2023-12-28 RX ADMIN — LIDOCAINE 1 PATCH: 50 PATCH TOPICAL at 14:41

## 2023-12-28 RX ADMIN — OXYCODONE HYDROCHLORIDE 5 MG: 5 TABLET ORAL at 17:09

## 2023-12-28 RX ADMIN — ACETAMINOPHEN 975 MG: 325 TABLET, FILM COATED ORAL at 14:41

## 2023-12-28 NOTE — DISCHARGE INSTRUCTIONS
Salon pas patches to the chest wall, 12 hours on/12 hours off.  Tylenol and Celebrex for mild to moderate pain relief, Percocet as needed for episodes of breakthrough pain.  Robaxin at bedtime as needed for alleviation of muscle spasms.  Please continue taking deep breaths as tolerated.  Return immediately to the emergency department if you experience any new or worsening symptoms as discussed.

## 2023-12-28 NOTE — ED PROVIDER NOTES
"History  Chief Complaint   Patient presents with    Fall     Patient reports fell on \"slimey steps\" and fell into a planter on her right side. Event happened immediately PTA. Patient denies blood thinners, denies LOC, denies head strike. Patient has right sided flank pain     Gertrude Bridges is a 68-year-old female arriving ambulatory to the emergency department for evaluation with complaint of right-sided chest wall and back pain in the setting of a mechanical fall earlier today.  The patient reports that she slipped on the wooden stairs in her home and struck the right side of the chest against a cement block with acute onset of pain localized to the area of trauma.  She notes that there was no head strike or loss of consciousness and she also denies use of anticoagulant medication.  She relates that her pain is localized to this area.  Her pain is exacerbated by movement with the patient admitting that it is difficult for her to find a position of comfort.  She denies trouble breathing and appears in no acute respiratory distress on assessment.  She offers no other complaints or concerns at this time.        Prior to Admission Medications   Prescriptions Last Dose Informant Patient Reported? Taking?   B Complex-Biotin-FA ( VITAMIN B100 COMPLEX) TABS  Self Yes No   Sig: Take by mouth   Calcium Carb-Cholecalciferol 600-800 MG-UNIT TABS  Self Yes No   Sig: Take 1 tablet by mouth   EPINEPHrine (EPIPEN) 0.3 mg/0.3 mL SOAJ  Self Yes No   Sig: Inject as directed   ESTRACE VAGINAL 0.1 MG/GM vaginal cream  Self No No   Sig: ONE GRAM PER VAGINA TWICE WEEKLY   LORazepam (Ativan) 0.5 mg tablet   No No   Sig: Take 1 tablet (0.5 mg total) by mouth daily as needed for anxiety   Multiple Vitamin (MULTI-VITAMIN DAILY) TABS  Self Yes No   Sig: Take by mouth   Turmeric 500 MG CAPS  Self Yes No   Sig: Take by mouth   albuterol (ProAir HFA) 90 mcg/act inhaler   No No   Sig: Inhale 2 puffs every 6 (six) hours as needed for wheezing "   ascorbic acid (VITAMIN C) 500 mg tablet  Self Yes No   Sig: Take 500 mg by mouth   benazepril (LOTENSIN) 40 MG tablet   No No   Sig: Take 1 tablet (40 mg total) by mouth daily   betamethasone valerate (VALISONE) 0.1 % cream   No No   Sig: APPLY TO AFFECTED AREA TOPICALLY EVERY DAY   celecoxib (CeleBREX) 200 mg capsule   No No   Sig: Take 1 capsule (200 mg total) by mouth daily   clobetasol (TEMOVATE) 0.05 % cream   No No   Sig: APPLY TOPICALLY TO AFFECTED AREA 2 X PER DAY, ARMS/LEGS FOR UP TO 2 WEEKS THEN 2-3 DAYS/WEEK   omeprazole (PriLOSEC) 20 mg delayed release capsule   No No   Sig: TAKE 1 CAPSULE BY MOUTH EVERY DAY   valACYclovir (VALTREX) 500 mg tablet   No No   Sig: Take 1 tablet (500 mg total) by mouth daily      Facility-Administered Medications: None       Past Medical History:   Diagnosis Date    Abnormal mammogram     last assessed-8/12/2014    Allergic     Arthritis     Chest pain     last assessed-6/17/2013-non cardiac    Chronic maxillary sinusitis     last assessed-11/10/2015    COPD (chronic obstructive pulmonary disease) (HCC)     Fatty liver     last assessed-6/11/2014    GERD (gastroesophageal reflux disease)     Hypertension     Lichen planus     Lyme disease     last assessed-6/17/2013    Raynaud's disease     Tick bites     last assessed-6/18/2013       Past Surgical History:   Procedure Laterality Date    ABDOMINAL SURGERY      EYE SURGERY Left     childhood       Family History   Problem Relation Age of Onset    Coronary artery disease Father     Cancer Father     Lung cancer Father     Cancer Maternal Aunt     Breast cancer Maternal Aunt      I have reviewed and agree with the history as documented.    E-Cigarette/Vaping    E-Cigarette Use Never User      E-Cigarette/Vaping Substances     Social History     Tobacco Use    Smoking status: Never    Smokeless tobacco: Never   Vaping Use    Vaping status: Never Used   Substance Use Topics    Alcohol use: Yes     Alcohol/week: 2.0 standard  drinks of alcohol     Types: 2 Glasses of wine per week    Drug use: No       Review of Systems   Constitutional:  Negative for diaphoresis and fatigue.   Respiratory:  Negative for shortness of breath.    Cardiovascular:  Positive for chest pain.   Gastrointestinal:  Positive for abdominal pain. Negative for nausea and vomiting.   Musculoskeletal:  Positive for back pain. Negative for neck pain and neck stiffness.   Neurological:  Negative for dizziness, weakness, numbness and headaches.   All other systems reviewed and are negative.      Physical Exam  Physical Exam  Vitals and nursing note reviewed.   Constitutional:       General: She is not in acute distress.     Appearance: Normal appearance. She is well-developed. She is not ill-appearing, toxic-appearing or diaphoretic.   HENT:      Head: Normocephalic and atraumatic.      Right Ear: External ear normal.      Left Ear: External ear normal.   Eyes:      Conjunctiva/sclera: Conjunctivae normal.   Cardiovascular:      Rate and Rhythm: Normal rate and regular rhythm.      Pulses: Normal pulses.   Pulmonary:      Effort: Pulmonary effort is normal. No respiratory distress.      Breath sounds: Normal breath sounds. No wheezing, rhonchi or rales.       Chest:      Chest wall: Tenderness present.   Abdominal:      General: There is no distension.      Palpations: Abdomen is soft.      Tenderness: There is no abdominal tenderness. There is no guarding or rebound.   Musculoskeletal:         General: Normal range of motion.      Cervical back: Normal range of motion and neck supple.      Comments: No midline spinous process tenderness to palpation along the cervical, thoracic, or lumbar spine.  No point tenderness, bony deformity or step-offs.   Skin:     General: Skin is warm and dry.      Capillary Refill: Capillary refill takes less than 2 seconds.   Neurological:      Mental Status: She is alert.      GCS: GCS eye subscore is 4. GCS verbal subscore is 5. GCS motor  subscore is 6.      Motor: Motor function is intact. No weakness.      Gait: Gait is intact.   Psychiatric:         Mood and Affect: Mood normal.         Vital Signs  ED Triage Vitals   Temperature Pulse Respirations Blood Pressure SpO2   12/28/23 1321 12/28/23 1321 12/28/23 1321 12/28/23 1321 12/28/23 1321   (!) 96.9 °F (36.1 °C) 98 20 (!) 187/76 100 %      Temp Source Heart Rate Source Patient Position - Orthostatic VS BP Location FiO2 (%)   12/28/23 1321 12/28/23 1321 12/28/23 1321 12/28/23 1321 --   Tympanic Monitor Sitting Right arm       Pain Score       12/28/23 1441       6           Vitals:    12/28/23 1321 12/28/23 1402   BP: (!) 187/76 141/73   Pulse: 98 61   Patient Position - Orthostatic VS: Sitting Sitting         Visual Acuity      ED Medications  Medications   lidocaine (LIDODERM) 5 % patch 1 patch (1 patch Topical Medication Applied 12/28/23 1441)   acetaminophen (TYLENOL) tablet 975 mg (975 mg Oral Given 12/28/23 1441)       Diagnostic Studies  Results Reviewed       Procedure Component Value Units Date/Time    CBC and differential [[id number]] Collected: 12/28/23 1437    Lab Status: Final result Specimen: Blood from Arm, Right Updated: 12/28/23 1444     WBC 8.46 Thousand/uL      RBC 4.60 Million/uL      Hemoglobin 13.8 g/dL      Hematocrit 41.8 %      MCV 91 fL      MCH 30.0 pg      MCHC 33.0 g/dL      RDW 12.8 %      MPV 10.1 fL      Platelets 226 Thousands/uL      nRBC 0 /100 WBCs      Neutrophils Relative 52 %      Immat GRANS % 1 %      Lymphocytes Relative 37 %      Monocytes Relative 7 %      Eosinophils Relative 2 %      Basophils Relative 1 %      Neutrophils Absolute 4.45 Thousands/µL      Immature Grans Absolute 0.04 Thousand/uL      Lymphocytes Absolute 3.09 Thousands/µL      Monocytes Absolute 0.63 Thousand/µL      Eosinophils Absolute 0.19 Thousand/µL      Basophils Absolute 0.06 Thousands/µL     Comprehensive metabolic panel [[id number]] Collected: 12/28/23 1437    Lab Status: In  process Specimen: Blood from Arm, Right Updated: 12/28/23 1441                   CT chest abdomen pelvis w contrast    (Results Pending)              Procedures  Procedures         ED Course                                             Medical Decision Making  Amount and/or Complexity of Data Reviewed  Labs: ordered.  Radiology: ordered.    Risk  OTC drugs.  Prescription drug management.             Disposition  Final diagnoses:   None     ED Disposition       None          Follow-up Information    None         Patient's Medications   Discharge Prescriptions    No medications on file       No discharge procedures on file.    PDMP Review         Value Time User    PDMP Reviewed  Yes 10/2/2023  9:24 AM Peter Fernandez MD            ED Provider  Electronically Signed by           earlier today.  Negative head strike or LOC, not on anticoagulant medication.  Pain is localized to the right chest wall.    Differential diagnosis includes but is not limited to: Will obtain imaging to evaluate for rib fx/dislocation, or any acute intrathoracic or intra-abdominal pelvic traumatic injury    Initial ED plan: labs, imaging, analgesia and reassessment    Final ED Assessment: Vital signs reviewed on ED presentation, examination as above. All labs and imaging independently reviewed with imaging interpreted by the Radiologist.  There are no significant lab results.  CT reports no traumatic injury in the chest, abdomen, pelvis with mention of incidental findings of bilateral pulmonary nodules.  All test results reviewed with the patient at bedside.  We discussed discharge plan with multimodal pain regimen of chest wall contusion to include topical Lidoderm patches, Tylenol & Motrin for mild to moderate pain relief and will discharge with short-term prescription for oxycodone to utilize as needed for episodes of breakthrough pain.  Standard narcotic precautions reviewed.  Robaxin at bedtime as needed for alleviation of muscle spasms.  Outpatient PCP follow-up for reassessment and further management as needed.  Parameters for ED return discussed at length.  Patient verbalized understanding plan as above which she is comfortable and agreeable with and she was discharged in stable condition.      Amount and/or Complexity of Data Reviewed  Labs: ordered.  Radiology: ordered.    Risk  OTC drugs.  Prescription drug management.             Disposition  Final diagnoses:   Fall, initial encounter   Right-sided chest wall pain   Incidental pulmonary nodule     Time reflects when diagnosis was documented in both MDM as applicable and the Disposition within this note       Time User Action Codes Description Comment    12/28/2023  5:02 PM Jessica Ball Add [W19.XXXA] Fall, initial encounter     12/28/2023  5:02 PM  Jessica Ball Add [R07.89] Right-sided chest wall pain     12/28/2023  5:03 PM Jessica Ball Add [R91.1] Incidental pulmonary nodule           ED Disposition       ED Disposition   Discharge    Condition   Stable    Date/Time   Thu Dec 28, 2023  4:43 PM    Comment   Gertrude Bridges discharge to home/self care.                   Follow-up Information       Follow up With Specialties Details Why Contact Info Additional Information    Peter Fernandez MD Internal Medicine   [address]  [facility] [address]  [facility] [address]  [phone]       [facility] Emergency Department Emergency Medicine  If symptoms worsen [address] [facility] [address] [phone] [facility] Emergency Department, [address] [place], [place], [address]            Discharge Medication List as of 12/28/2023  5:05 PM        START taking these medications    Details   methocarbamol (ROBAXIN) 500 mg tablet Take 1 tablet (500 mg total) by mouth 2 (two) times a day, Starting u 12/28/2023, Normal      oxyCODONE-acetaminophen (Percocet) 5-325 mg per tablet Take 1 tablet by mouth every 8 (eight) hours as needed for moderate pain for up to 10 days Max Daily Amount: 3 tablets, Starting Thu 12/28/2023, Until Sun 1/7/2024 at 2359, Normal           CONTINUE these medications which have NOT CHANGED    Details   albuterol (ProAir HFA) 90 mcg/act inhaler Inhale 2 puffs every 6 (six) hours as needed for wheezing, Starting Fri 12/30/2022, Normal      ascorbic acid (VITAMIN C) 500 mg tablet Take 500 mg by mouth, Historical Med      B Complex-Biotin-FA (HM VITAMIN B100 COMPLEX) TABS Take by mouth, Historical Med      benazepril (LOTENSIN) 40 MG tablet Take 1 tablet (40 mg total) by mouth daily, Starting Mon 10/2/2023, Normal      betamethasone valerate (VALISONE) 0.1 % cream APPLY TO AFFECTED AREA TOPICALLY EVERY DAY, Normal      Calcium Carb-Cholecalciferol 600-800 MG-UNIT TABS Take 1 tablet  by mouth, Historical Med      celecoxib (CeleBREX) 200 mg capsule Take 1 capsule (200 mg total) by mouth daily, Starting Thu 11/30/2023, Normal      clobetasol (TEMOVATE) 0.05 % cream APPLY TOPICALLY TO AFFECTED AREA 2 X PER DAY, ARMS/LEGS FOR UP TO 2 WEEKS THEN 2-3 DAYS/WEEK, Normal      EPINEPHrine (EPIPEN) 0.3 mg/0.3 mL SOAJ Inject as directed, Historical Med      ESTRACE VAGINAL 0.1 MG/GM vaginal cream ONE GRAM PER VAGINA TWICE WEEKLY, Normal      LORazepam (Ativan) 0.5 mg tablet Take 1 tablet (0.5 mg total) by mouth daily as needed for anxiety, Starting Mon 10/2/2023, Normal      Multiple Vitamin (MULTI-VITAMIN DAILY) TABS Take by mouth, Historical Med      omeprazole (PriLOSEC) 20 mg delayed release capsule TAKE 1 CAPSULE BY MOUTH EVERY DAY, Starting Wed 10/18/2023, Normal      Turmeric 500 MG CAPS Take by mouth, Historical Med      valACYclovir (VALTREX) 500 mg tablet Take 1 tablet (500 mg total) by mouth daily, Starting Mon 8/17/2020, Until Mon 10/2/2023, Normal             No discharge procedures on file.    PDMP Review         Value Time User    PDMP Reviewed  Yes 10/2/2023  9:24 AM Peter Fernandez MD            ED Provider  Electronically Signed by             Jessica Ball PA-C  01/11/24 [id number]

## 2024-02-28 ENCOUNTER — OFFICE VISIT (OUTPATIENT)
Dept: URGENT CARE | Facility: CLINIC | Age: 69
End: 2024-02-28
Payer: MEDICARE

## 2024-02-28 VITALS
OXYGEN SATURATION: 100 % | SYSTOLIC BLOOD PRESSURE: 150 MMHG | TEMPERATURE: 97.6 F | HEART RATE: 54 BPM | DIASTOLIC BLOOD PRESSURE: 64 MMHG | RESPIRATION RATE: 16 BRPM

## 2024-02-28 DIAGNOSIS — J20.9 ACUTE BRONCHITIS, UNSPECIFIED ORGANISM: Primary | ICD-10-CM

## 2024-02-28 DIAGNOSIS — R05.1 ACUTE COUGH: ICD-10-CM

## 2024-02-28 LAB
SARS-COV-2 AG UPPER RESP QL IA: NEGATIVE
VALID CONTROL: NORMAL

## 2024-02-28 PROCEDURE — 87811 SARS-COV-2 COVID19 W/OPTIC: CPT

## 2024-02-28 PROCEDURE — 99214 OFFICE O/P EST MOD 30 MIN: CPT

## 2024-02-28 PROCEDURE — G0463 HOSPITAL OUTPT CLINIC VISIT: HCPCS

## 2024-02-28 RX ORDER — BENZONATATE 200 MG/1
200 CAPSULE ORAL 3 TIMES DAILY PRN
Qty: 20 CAPSULE | Refills: 0 | Status: SHIPPED | OUTPATIENT
Start: 2024-02-28

## 2024-02-28 RX ORDER — PREDNISONE 20 MG/1
20 TABLET ORAL DAILY
Qty: 5 TABLET | Refills: 0 | Status: SHIPPED | OUTPATIENT
Start: 2024-02-28 | End: 2024-03-04

## 2024-02-28 RX ORDER — ALBUTEROL SULFATE 90 UG/1
2 AEROSOL, METERED RESPIRATORY (INHALATION) EVERY 6 HOURS PRN
Qty: 8.5 G | Refills: 0 | Status: SHIPPED | OUTPATIENT
Start: 2024-02-28

## 2024-02-28 NOTE — PROGRESS NOTES
[facility]        NAME: Gertrude Bridges is a 68 y.o. female  : 1955    MRN: [id number]  DATE: 2024  TIME: 8:51 AM    Assessment and Plan   Acute bronchitis, unspecified organism [J20.9]  1. Acute bronchitis, unspecified organism  predniSONE 20 mg tablet    benzonatate (TESSALON) 200 MG capsule    albuterol (ProAir HFA) 90 mcg/act inhaler      2. Acute cough  Poct Covid 19 Rapid Antigen Test        COVID antigen negative.    Patient Instructions     Please take Prednisone daily with breakfast for 5 days.  Please trial Tessalon every 8 hours as needed for cough.   Please trial Albuterol every 6 hours as needed for chest tightness.   Drink plenty of fluids.   Follow up with PCP in 3-5 days.  Proceed to  ER if symptoms worsen.    Chief Complaint     Chief Complaint   Patient presents with    Cold Like Symptoms     Pt c/o coughm sneezing, stuffy nose and wheezing for 3 days. OTC meds: mucinex and claritin - not helping          History of Present Illness       68-year-old female presenting for evaluation of viral symptoms.  Patient states over the past 3 days she has been experiencing chills, congestion, sneezing, cough and wheezing.  She has been taking Mucinex, [place] and Claritin with minimal relief of her symptoms.  She denies any chest pain, shortness of breath, fevers, nausea, vomiting or diarrhea.  She denies any alleviating factors of her symptoms.            Review of Systems   Review of Systems   Constitutional:  Positive for chills. Negative for fever.   HENT:  Positive for congestion and sneezing. Negative for sore throat.    Respiratory:  Positive for cough and wheezing. Negative for shortness of breath.    Cardiovascular:  Negative for chest pain.   Gastrointestinal:  Negative for diarrhea, nausea and vomiting.   All other systems reviewed and are negative.        Current Medications       Current Outpatient Medications:     albuterol (ProAir HFA) 90 mcg/act inhaler, Inhale 2  puffs every 6 (six) hours as needed for wheezing, Disp: 8.5 g, Rfl: 0    albuterol (ProAir HFA) 90 mcg/act inhaler, Inhale 2 puffs every 6 (six) hours as needed for wheezing, Disp: 8.5 g, Rfl: 0    ascorbic acid (VITAMIN C) 500 mg tablet, Take 500 mg by mouth, Disp: , Rfl:     B Complex-Biotin-FA (HM VITAMIN B100 COMPLEX) TABS, Take by mouth, Disp: , Rfl:     benazepril (LOTENSIN) 40 MG tablet, Take 1 tablet (40 mg total) by mouth daily, Disp: 90 tablet, Rfl: 3    benzonatate (TESSALON) 200 MG capsule, Take 1 capsule (200 mg total) by mouth 3 (three) times a day as needed for cough, Disp: 20 capsule, Rfl: 0    betamethasone valerate (VALISONE) 0.1 % cream, APPLY TO AFFECTED AREA TOPICALLY EVERY DAY, Disp: 30 g, Rfl: 1    Calcium Carb-Cholecalciferol 600-800 MG-UNIT TABS, Take 1 tablet by mouth, Disp: , Rfl:     celecoxib (CeleBREX) 200 mg capsule, Take 1 capsule (200 mg total) by mouth daily, Disp: 90 capsule, Rfl: 1    clobetasol (TEMOVATE) 0.05 % cream, APPLY TOPICALLY TO AFFECTED AREA 2 X PER DAY, ARMS/LEGS FOR UP TO 2 WEEKS THEN 2-3 DAYS/WEEK, Disp: 30 g, Rfl: 5    LORazepam (Ativan) 0.5 mg tablet, Take 1 tablet (0.5 mg total) by mouth daily as needed for anxiety, Disp: 30 tablet, Rfl: 0    Multiple Vitamin (MULTI-VITAMIN DAILY) TABS, Take by mouth, Disp: , Rfl:     omeprazole (PriLOSEC) 20 mg delayed release capsule, TAKE 1 CAPSULE BY MOUTH EVERY DAY, Disp: 90 capsule, Rfl: 3    predniSONE 20 mg tablet, Take 1 tablet (20 mg total) by mouth daily for 5 days, Disp: 5 tablet, Rfl: 0    Turmeric 500 MG CAPS, Take by mouth, Disp: , Rfl:     EPINEPHrine (EPIPEN) 0.3 mg/0.3 mL SOAJ, Inject as directed, Disp: , Rfl:     ESTRACE VAGINAL 0.1 MG/GM vaginal cream, ONE GRAM PER VAGINA TWICE WEEKLY (Patient not taking: Reported on 2/28/2024), Disp: 42.5 g, Rfl: 0    methocarbamol (ROBAXIN) 500 mg tablet, Take 1 tablet (500 mg total) by mouth 2 (two) times a day (Patient not taking: Reported on 2/28/2024), Disp: 20 tablet,  Rfl: 0    valACYclovir (VALTREX) 500 mg tablet, Take 1 tablet (500 mg total) by mouth daily, Disp: 90 tablet, Rfl: 3    Current Allergies     Allergies as of 02/28/2024 - Reviewed 02/28/2024   Allergen Reaction Noted    Bee venom Anaphylaxis 05/03/2009            The following portions of the patient's history were reviewed and updated as appropriate: allergies, current medications, past family history, past medical history, past social history, past surgical history and problem list.     Past Medical History:   Diagnosis Date    Abnormal mammogram     last assessed-8/12/2014    Allergic     Arthritis     Chest pain     last assessed-6/17/2013-non cardiac    Chronic maxillary sinusitis     last assessed-11/10/2015    COPD (chronic obstructive pulmonary disease) (HCC)     Fatty liver     last assessed-6/11/2014    GERD (gastroesophageal reflux disease)     Hypertension     Lichen planus     Lyme disease     last assessed-6/17/2013    Raynaud's disease     Tick bites     last assessed-6/18/2013       Past Surgical History:   Procedure Laterality Date    ABDOMINAL SURGERY      EYE SURGERY Left     childhood       Family History   Problem Relation Age of Onset    Coronary artery disease Father     Cancer Father     Lung cancer Father     Cancer Maternal Aunt     Breast cancer Maternal Aunt          Medications have been verified.        Objective   /64   Pulse (!) 54   Temp 97.6 °F (36.4 °C)   Resp 16   SpO2 100%        Physical Exam     Physical Exam  Vitals and nursing note reviewed.   Constitutional:       General: She is not in acute distress.     Appearance: Normal appearance. She is normal weight. She is not ill-appearing, toxic-appearing or diaphoretic.   HENT:      Head: Normocephalic and atraumatic.      Right Ear: Tympanic membrane normal.      Left Ear: Tympanic membrane normal.      Nose: Congestion and rhinorrhea present.      Mouth/Throat:      Mouth: Mucous membranes are moist.      Pharynx:  Oropharynx is clear. No oropharyngeal exudate or posterior oropharyngeal erythema.   Eyes:      General:         Right eye: No discharge.         Left eye: No discharge.   Cardiovascular:      Rate and Rhythm: Normal rate and regular rhythm.      Pulses: Normal pulses.      Heart sounds: Normal heart sounds. No murmur heard.     No friction rub. No gallop.   Pulmonary:      Effort: Pulmonary effort is normal. No respiratory distress.      Breath sounds: No stridor. Wheezing (scattered) present. No rhonchi or rales.   Chest:      Chest wall: No tenderness.   Abdominal:      General: Bowel sounds are normal.      Palpations: Abdomen is soft.      Tenderness: There is no abdominal tenderness.   Skin:     General: Skin is warm and dry.   Neurological:      Mental Status: She is alert.   Psychiatric:         Mood and Affect: Mood normal.         Behavior: Behavior normal.

## 2024-02-28 NOTE — PATIENT INSTRUCTIONS
Please take Prednisone daily with breakfast for 5 days.  Please trial Tessalon every 8 hours as needed for cough.   Please trial Albuterol every 6 hours as needed for chest tightness.   Drink plenty of fluids.

## 2024-03-01 ENCOUNTER — HOSPITAL ENCOUNTER (OUTPATIENT)
Dept: RADIOLOGY | Facility: HOSPITAL | Age: 69
End: 2024-03-01
Payer: MEDICARE

## 2024-03-01 DIAGNOSIS — R26.9 ABNORMALITY OF GAIT: ICD-10-CM

## 2024-03-01 DIAGNOSIS — M25.552 HIP PAIN, BILATERAL: ICD-10-CM

## 2024-03-01 DIAGNOSIS — M25.551 HIP PAIN, BILATERAL: ICD-10-CM

## 2024-03-01 DIAGNOSIS — M99.06 SOMATIC DYSFUNCTION OF LOWER EXTREMITIES: ICD-10-CM

## 2024-03-01 DIAGNOSIS — M46.1 SACROILIITIS, NOT ELSEWHERE CLASSIFIED (HCC): ICD-10-CM

## 2024-03-01 PROCEDURE — 73521 X-RAY EXAM HIPS BI 2 VIEWS: CPT

## 2024-03-05 ENCOUNTER — OFFICE VISIT (OUTPATIENT)
Dept: INTERNAL MEDICINE CLINIC | Facility: CLINIC | Age: 69
End: 2024-03-05
Payer: MEDICARE

## 2024-03-05 VITALS
WEIGHT: 197 LBS | OXYGEN SATURATION: 98 % | HEIGHT: 67 IN | HEART RATE: 56 BPM | SYSTOLIC BLOOD PRESSURE: 166 MMHG | DIASTOLIC BLOOD PRESSURE: 88 MMHG | TEMPERATURE: 97.2 F | BODY MASS INDEX: 30.92 KG/M2

## 2024-03-05 DIAGNOSIS — J30.2 SEASONAL ALLERGIC RHINITIS, UNSPECIFIED TRIGGER: Primary | ICD-10-CM

## 2024-03-05 DIAGNOSIS — J01.90 ACUTE NON-RECURRENT SINUSITIS, UNSPECIFIED LOCATION: ICD-10-CM

## 2024-03-05 DIAGNOSIS — B00.9 HERPES SIMPLEX INFECTION: ICD-10-CM

## 2024-03-05 PROCEDURE — 99214 OFFICE O/P EST MOD 30 MIN: CPT | Performed by: INTERNAL MEDICINE

## 2024-03-05 PROCEDURE — G2211 COMPLEX E/M VISIT ADD ON: HCPCS | Performed by: INTERNAL MEDICINE

## 2024-03-05 RX ORDER — VALACYCLOVIR HYDROCHLORIDE 500 MG/1
500 TABLET, FILM COATED ORAL DAILY
Qty: 30 TABLET | Refills: 3 | Status: SHIPPED | OUTPATIENT
Start: 2024-03-05 | End: 2024-04-04

## 2024-03-05 RX ORDER — AZITHROMYCIN 250 MG/1
TABLET, FILM COATED ORAL DAILY
Qty: 6 TABLET | Refills: 0 | Status: SHIPPED | OUTPATIENT
Start: 2024-03-05 | End: 2024-03-09

## 2024-03-05 NOTE — PROGRESS NOTES
Assessment/Plan:    Acute sinusitis, she recently completed course of steroids, sent z pack today.    Valtrex for recurrent cold sores.    CT done revealing incidental lung nodules, can repeat in 1 year from 12.2023.      Quality Measures:       Depression Screening and Follow-up Plan: Clincally patient does not have depression. No treatment is required.        Return for Next scheduled follow up.    No problem-specific Assessment & Plan notes found for this encounter.       Diagnoses and all orders for this visit:    Seasonal allergic rhinitis, unspecified trigger    Acute non-recurrent sinusitis, unspecified location  -     azithromycin (Zithromax) 250 mg tablet; Take 2 tablets (500 mg total) by mouth daily for 1 day, THEN 1 tablet (250 mg total) daily for 4 days.    Herpes simplex infection  -     valACYclovir (VALTREX) 500 mg tablet; Take 1 tablet (500 mg total) by mouth daily          Subjective:      Patient ID: Gertrude Bridges is a 68 y.o. female.    Cough  Associated symptoms include ear pain. Pertinent negatives include no chest pain, chills, fever, rash, sore throat or shortness of breath.   Earache   Associated symptoms include coughing. Pertinent negatives include no abdominal pain, rash, sore throat or vomiting.       ALLERGIES:  Allergies   Allergen Reactions   • Bee Venom Anaphylaxis     REQUIRES EPI PEN  REQUIRES EPI PEN       CURRENT MEDICATIONS:    Current Outpatient Medications:   •  albuterol (ProAir HFA) 90 mcg/act inhaler, Inhale 2 puffs every 6 (six) hours as needed for wheezing, Disp: 8.5 g, Rfl: 0  •  albuterol (ProAir HFA) 90 mcg/act inhaler, Inhale 2 puffs every 6 (six) hours as needed for wheezing, Disp: 8.5 g, Rfl: 0  •  ascorbic acid (VITAMIN C) 500 mg tablet, Take 500 mg by mouth, Disp: , Rfl:   •  azithromycin (Zithromax) 250 mg tablet, Take 2 tablets (500 mg total) by mouth daily for 1 day, THEN 1 tablet (250 mg total) daily for 4 days., Disp: 6 tablet, Rfl: 0  •  B Complex-Biotin-FA  (HM VITAMIN B100 COMPLEX) TABS, Take by mouth, Disp: , Rfl:   •  benazepril (LOTENSIN) 40 MG tablet, Take 1 tablet (40 mg total) by mouth daily, Disp: 90 tablet, Rfl: 3  •  benzonatate (TESSALON) 200 MG capsule, Take 1 capsule (200 mg total) by mouth 3 (three) times a day as needed for cough, Disp: 20 capsule, Rfl: 0  •  betamethasone valerate (VALISONE) 0.1 % cream, APPLY TO AFFECTED AREA TOPICALLY EVERY DAY, Disp: 30 g, Rfl: 1  •  Calcium Carb-Cholecalciferol 600-800 MG-UNIT TABS, Take 1 tablet by mouth, Disp: , Rfl:   •  celecoxib (CeleBREX) 200 mg capsule, Take 1 capsule (200 mg total) by mouth daily, Disp: 90 capsule, Rfl: 1  •  clobetasol (TEMOVATE) 0.05 % cream, APPLY TOPICALLY TO AFFECTED AREA 2 X PER DAY, ARMS/LEGS FOR UP TO 2 WEEKS THEN 2-3 DAYS/WEEK, Disp: 30 g, Rfl: 5  •  EPINEPHrine (EPIPEN) 0.3 mg/0.3 mL SOAJ, Inject as directed, Disp: , Rfl:   •  LORazepam (Ativan) 0.5 mg tablet, Take 1 tablet (0.5 mg total) by mouth daily as needed for anxiety, Disp: 30 tablet, Rfl: 0  •  Multiple Vitamin (MULTI-VITAMIN DAILY) TABS, Take by mouth, Disp: , Rfl:   •  omeprazole (PriLOSEC) 20 mg delayed release capsule, TAKE 1 CAPSULE BY MOUTH EVERY DAY, Disp: 90 capsule, Rfl: 3  •  Turmeric 500 MG CAPS, Take by mouth, Disp: , Rfl:   •  valACYclovir (VALTREX) 500 mg tablet, Take 1 tablet (500 mg total) by mouth daily, Disp: 30 tablet, Rfl: 3  •  ESTRACE VAGINAL 0.1 MG/GM vaginal cream, ONE GRAM PER VAGINA TWICE WEEKLY (Patient not taking: Reported on 2/28/2024), Disp: 42.5 g, Rfl: 0  •  methocarbamol (ROBAXIN) 500 mg tablet, Take 1 tablet (500 mg total) by mouth 2 (two) times a day (Patient not taking: Reported on 2/28/2024), Disp: 20 tablet, Rfl: 0    ACTIVE PROBLEM LIST:  Patient Active Problem List   Diagnosis   • Allergic rhinitis, seasonal   • Other anaphylactic reaction   • Anxiety disorder   • Atopic dermatitis   • Claustrophobia   • Depression   • Esophageal reflux   • Fibrocystic breast disease   • Herpes  simplex infection   • Herpetic gingivostomatitis   • Hyperlipidemia   • Hypertension   • LP (lichen planus)   • Ophthalmoplegic migraine headache   • Raynaud's disease, idiopathic   • Vitamin D deficiency   • Allergy to bee sting   • Deformity of toenail   • Encounter for hepatitis C screening test for low risk patient   • Chronic pain of both knees       PAST MEDICAL HISTORY:  Past Medical History:   Diagnosis Date   • Abnormal mammogram     last assessed-8/12/2014   • Allergic    • Arthritis    • Chest pain     last assessed-6/17/2013-non cardiac   • Chronic maxillary sinusitis     last assessed-11/10/2015   • COPD (chronic obstructive pulmonary disease) (HCC)    • Fatty liver     last assessed-6/11/2014   • GERD (gastroesophageal reflux disease)    • Hypertension    • Lichen planus    • Lyme disease     last assessed-6/17/2013   • Raynaud's disease    • Tick bites     last assessed-6/18/2013       PAST SURGICAL HISTORY:  Past Surgical History:   Procedure Laterality Date   • ABDOMINAL SURGERY     • EYE SURGERY Left     childhood       FAMILY HISTORY:  Family History   Problem Relation Age of Onset   • Coronary artery disease Father    • Cancer Father    • Lung cancer Father    • Cancer Maternal Aunt    • Breast cancer Maternal Aunt        SOCIAL HISTORY:  Social History     Socioeconomic History   • Marital status: /Civil Union     Spouse name: Not on file   • Number of children: Not on file   • Years of education: Not on file   • Highest education level: Not on file   Occupational History   • Not on file   Tobacco Use   • Smoking status: Never   • Smokeless tobacco: Never   Vaping Use   • Vaping status: Never Used   Substance and Sexual Activity   • Alcohol use: Yes     Alcohol/week: 2.0 standard drinks of alcohol     Types: 2 Glasses of wine per week   • Drug use: No   • Sexual activity: Not Currently     Partners: Male     Birth control/protection: Abstinence   Other Topics Concern   • Not on file  "  Social History Narrative    Caffeine use - 2 cups coffee/day                         - 2 cups green tea/day     Social Determinants of Health     Financial Resource Strain: Low Risk  (10/2/2023)    Overall Financial Resource Strain (CARDIA)    • Difficulty of Paying Living Expenses: Not hard at all   Food Insecurity: Not on file   Transportation Needs: No Transportation Needs (10/2/2023)    PRAPARE - Transportation    • Lack of Transportation (Medical): No    • Lack of Transportation (Non-Medical): No   Physical Activity: Not on file   Stress: Not on file   Social Connections: Not on file   Intimate Partner Violence: Not on file   Housing Stability: Not on file       Review of Systems   Constitutional:  Negative for chills and fever.   HENT:  Positive for ear pain. Negative for sore throat.    Eyes:  Negative for pain and visual disturbance.   Respiratory:  Positive for cough. Negative for shortness of breath.    Cardiovascular:  Negative for chest pain and palpitations.   Gastrointestinal:  Negative for abdominal pain and vomiting.   Genitourinary:  Negative for dysuria and hematuria.   Musculoskeletal:  Negative for arthralgias and back pain.   Skin:  Negative for color change and rash.   Neurological:  Negative for seizures and syncope.   All other systems reviewed and are negative.        Objective:  Vitals:    03/05/24 0949   BP: 166/88   BP Location: Right arm   Patient Position: Sitting   Cuff Size: Standard   Pulse: 56   Temp: (!) 97.2 °F (36.2 °C)   SpO2: 98%   Weight: 89.4 kg (197 lb)   Height: 5' 7\" (1.702 m)     Body mass index is 30.85 kg/m².     Physical Exam  Vitals and nursing note reviewed.   Constitutional:       Appearance: Normal appearance. She is ill-appearing.   HENT:      Head: Normocephalic and atraumatic.   Cardiovascular:      Rate and Rhythm: Normal rate and regular rhythm.      Heart sounds: Normal heart sounds.   Pulmonary:      Effort: Pulmonary effort is normal.      Breath sounds: " Normal breath sounds.   Musculoskeletal:         General: Normal range of motion.      Cervical back: Normal range of motion.      Right lower leg: No edema.      Left lower leg: No edema.   Skin:     General: Skin is warm and dry.   Neurological:      General: No focal deficit present.      Mental Status: She is alert and oriented to person, place, and time. Mental status is at baseline.   Psychiatric:         Mood and Affect: Mood normal.         RESULTS:  Hemoglobin A1C   Date/Time Value Ref Range Status   10/02/2023 10:19 AM 5.9 (H) Normal 4.0-5.6%; PreDiabetic 5.7-6.4%; Diabetic >=6.5%; Glycemic control for adults with diabetes <7.0% % Final     Cholesterol   Date/Time Value Ref Range Status   10/02/2023 10:19  (H) See Comment mg/dL Final     Comment:     Cholesterol:         Pediatric <18 Years        Desirable          <170 mg/dL      Borderline High    170-199 mg/dL      High               >=200 mg/dL        Adult >=18 Years            Desirable        <200 mg/dL      Borderline High  200-239 mg/dL      High             >239 mg/dL       Cholesterol, Total   Date/Time Value Ref Range Status   08/23/2018 09:25  (H) 100 - 199 mg/dL Final     Triglycerides   Date/Time Value Ref Range Status   10/02/2023 10:19 AM 69 See Comment mg/dL Final     Comment:     Triglyceride:     0-9Y            <75mg/dL     10Y-17Y         <90 mg/dL       >=18Y     Normal          <150 mg/dL     Borderline High 150-199 mg/dL     High            200-499 mg/dL        Very High       >499 mg/dL    Specimen collection should occur prior to Metamizole administration due to the potential for falsely depressed results.   08/23/2018 09:25 AM 58 0 - 149 mg/dL Final     HDL   Date/Time Value Ref Range Status   08/23/2018 09:25 AM 79 >39 mg/dL Final     HDL, Direct   Date/Time Value Ref Range Status   10/02/2023 10:19 AM 80 >=50 mg/dL Final     LDL Calculated   Date/Time Value Ref Range Status   10/02/2023 10:19  (H) 0 - 100  mg/dL Final     Comment:     LDL Cholesterol:     Optimal           <100 mg/dl     Near Optimal      100-129 mg/dl     Above Optimal       Borderline High 130-159 mg/dl       High            160-189 mg/dl       Very High       >189 mg/dl         This screening LDL is a calculated result.   It does not have the accuracy of the Direct Measured LDL in the monitoring of patients with hyperlipidemia and/or statin therapy.   Direct Measure LDL (DUM700) must be ordered separately in these patients.   08/23/2018 09:25  (H) 0 - 99 mg/dL Final     Hemoglobin   Date/Time Value Ref Range Status   12/28/2023 02:37 PM 13.8 11.5 - 15.4 g/dL Final   06/28/2016 08:28 AM 13.3 11.1 - 15.9 g/dL Final     Hematocrit   Date/Time Value Ref Range Status   12/28/2023 02:37 PM 41.8 34.8 - 46.1 % Final   06/28/2016 08:28 AM 40.6 34.0 - 46.6 % Final     Platelets   Date/Time Value Ref Range Status   12/28/2023 02:37  149 - 390 Thousands/uL Final   06/28/2016 08:28  150 - 379 x10E3/uL Final     TSH 3RD GENERATON   Date/Time Value Ref Range Status   10/02/2023 10:19 AM 1.773 0.450 - 4.500 uIU/mL Final     Comment:     The recommended reference ranges for TSH during pregnancy are as follows:   First trimester 0.1 to 2.5 uIU/mL   Second trimester  0.2 to 3.0 uIU/mL   Third trimester 0.3 to 3.0 uIU/m    Note: Normal ranges may not apply to patients who are transgender, non-binary, or whose legal sex, sex at birth, and gender identity differ.  Adult TSH (3rd generation) reference range follows the recommended guidelines of the American Thyroid Association, January, 2020.   06/28/2016 08:28 AM 2.600 0.450 - 4.500 uIU/mL Final     Comment:     Performed at: [facility], [address] [place] [address], , [place], [place], [id number], [id number]  MD:  [address] [place] [phone]       Sodium   Date/Time Value Ref Range Status   12/28/2023 02:37  135 - 147 mmol/L Final   08/23/2018 09:25  134 - 144 mmol/L Final     BUN   Date/Time  Value Ref Range Status   12/28/2023 02:37 PM 15 5 - 25 mg/dL Final   08/23/2018 09:25 AM 15 8 - 27 mg/dL Final     Creatinine   Date/Time Value Ref Range Status   12/28/2023 02:37 PM 0.74 0.60 - 1.30 mg/dL Final     Comment:     Standardized to IDMS reference method   06/28/2016 08:28 AM 0.81 0.57 - 1.00 mg/dL Final      In chart    This note was created with voice recognition software.  Phonic, grammatical and spelling errors may be present within the note as a result.

## 2024-03-27 DIAGNOSIS — B00.9 HERPES SIMPLEX INFECTION: ICD-10-CM

## 2024-03-27 RX ORDER — VALACYCLOVIR HYDROCHLORIDE 1 G/1
1000 TABLET, FILM COATED ORAL 2 TIMES DAILY
Qty: 60 TABLET | Refills: 2 | Status: SHIPPED | OUTPATIENT
Start: 2024-03-27 | End: 2024-06-25

## 2024-04-21 DIAGNOSIS — B00.9 HERPES SIMPLEX INFECTION: ICD-10-CM

## 2024-04-22 RX ORDER — VALACYCLOVIR HYDROCHLORIDE 1 G/1
1000 TABLET, FILM COATED ORAL 2 TIMES DAILY
Qty: 180 TABLET | Refills: 0 | Status: SHIPPED | OUTPATIENT
Start: 2024-04-22 | End: 2024-07-21

## 2024-08-19 ENCOUNTER — NURSE TRIAGE (OUTPATIENT)
Age: 69
End: 2024-08-19

## 2024-08-19 NOTE — TELEPHONE ENCOUNTER
"Pt called in stating she had HSV in college. States she has flare ups when she is stressed. Is currently under a lot of stress with family and has a flare up, takes valtrex 500 mg daily. Doubled dosage to 1000 mg for her flare up. States that she does have a sore on her outer vagina, using desitin cream which is helping. Pt also states she notices a foul odor and vaginal itching within the last couple of weeks. Unsure if she has discharge as she does not wear underwear. Pt given appointment for 8/21/24 and is thankful.     Reason for Disposition   Vaginal itching and not improved > 3 days following CARE ADVICE    Answer Assessment - Initial Assessment Questions  1. SYMPTOM: \"What's the main symptom you're concerned about?\" (e.g., pain, itching, dryness)      Vaginal odor, itching, sore on vagina  2. LOCATION: \"Where is the  symptoms located?\" (e.g., inside/outside, left/right)      vagina  3. ONSET: \"When did the  symptoms  start?\"      A couple of weeks ago  4. PAIN: \"Is there any pain?\" If Yes, ask: \"How bad is it?\" (Scale: 1-10; mild, moderate, severe)      denies  5. ITCHING: \"Is there any itching?\" If Yes, ask: \"How bad is it?\" (Scale: 1-10; mild, moderate, severe)      mild  6. CAUSE: \"What do you think is causing the discharge?\" \"Have you had the same problem before? What happened then?\"      BV/yeast?  7. OTHER SYMPTOMS: \"Do you have any other symptoms?\" (e.g., fever, itching, vaginal bleeding, pain with urination, injury to genital area, vaginal foreign body)      denies  8. PREGNANCY: \"Is there any chance you are pregnant?\" \"When was your last menstrual period?\"      denies    Protocols used: Vaginal Symptoms-ADULT-[place]    "

## 2024-08-21 ENCOUNTER — OFFICE VISIT (OUTPATIENT)
Dept: OBGYN CLINIC | Facility: CLINIC | Age: 69
End: 2024-08-21
Payer: MEDICARE

## 2024-08-21 VITALS
HEIGHT: 67 IN | SYSTOLIC BLOOD PRESSURE: 122 MMHG | DIASTOLIC BLOOD PRESSURE: 80 MMHG | WEIGHT: 191 LBS | BODY MASS INDEX: 29.98 KG/M2

## 2024-08-21 DIAGNOSIS — L29.2 VULVOVAGINAL ITCHING: ICD-10-CM

## 2024-08-21 DIAGNOSIS — N89.8 VAGINAL ODOR: Primary | ICD-10-CM

## 2024-08-21 PROCEDURE — 87480 CANDIDA DNA DIR PROBE: CPT | Performed by: PHYSICIAN ASSISTANT

## 2024-08-21 PROCEDURE — 87510 GARDNER VAG DNA DIR PROBE: CPT | Performed by: PHYSICIAN ASSISTANT

## 2024-08-21 PROCEDURE — 99202 OFFICE O/P NEW SF 15 MIN: CPT | Performed by: PHYSICIAN ASSISTANT

## 2024-08-21 PROCEDURE — 87660 TRICHOMONAS VAGIN DIR PROBE: CPT | Performed by: PHYSICIAN ASSISTANT

## 2024-08-21 NOTE — PROGRESS NOTES
Assessment/Plan:      Diagnoses and all orders for this visit:    Vaginal odor  -     VAGINOSIS DNA PROBE    Vulvovaginal itching  -     VAGINOSIS DNA PROBE     68-year-old female presenting today as a new patient history of genital HSV, lichen planus, postmenopausal vaginal dryness presenting today for vaginal symptoms as in HPI.  Recent herpes outbreak completed course of Valtrex, no visible lesions or symptoms today.  Pre-existing vaginal dryness, issues with the vaginal Estrace in the past discontinued.  Benefit from oral probiotic per patient increasing vaginal moisture, dryness has not been an issue.    Vulvovaginal exam today relatively unremarkable.    Vaginosis probe collected and obtained today.  Will await results and treat accordingly.  Counseled patient regarding other options other than topical hormonal management for vaginal dryness.  Can consider coconut oil mixed with a little bit of water and gentle vulvovaginal massage to retain moisture, can continue probiotic.    Recommend consideration for Medicare GYN annual exam  Pap smear last completed 2019 and was normal.  Denies any history of abnormal Pap smear.  Per ASCCP guidelines and considering patient's age, cervical cancer screening can be discontinued.  Patient up-to-date with mammogram screening [facility] in March 2024, normal.  Recommend yearly breast cancer screening and breast exams.  DEXA scan recommended, states will discuss with her PCP at Medicare annual wellness in the upcoming month.  Recommend daily vitamin D and calcium supplementation.  Patient up-to-date with Cologuard screening from 2023, due again 2026.    Chief Complaint   Patient presents with    Vginal Itching     Pt states that she has herpes and want to have it checked because she's having itching and odor.         Subjective:     Patient ID: Gertrude Bridges is a 68 y.o. female.    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     She has hx of genital herpes for which she  takes valtrex prn, gets outbreaks w/ stress.  Recent stress w/ family health issues.  States recent outbreak, feeling better - completed recent course of valtrex    Pt has pre-existing vaginal dryness, since improved with using an OTC oral probiotic.  Also lichen planus in mouth and skin for which she uses topical clobetasol.    States she has more recently having a strong vaginal odor, as well as new itching.  No visible discharge.  Normal urination.  No pelvic pain.  Denies PMB.     She used to have a lot of vaginal dryness for which vaginal estrace cream was precsribed.  She has since d/c'd it, using probiotic and helping to increase moisture.     Cologard July 2023 - negative  Mammogram [facility] 3/2024  PAP 2019         Review of Systems   Constitutional: Negative.    HENT:          Dry mouth    Gastrointestinal:  Negative for abdominal pain, constipation, diarrhea, nausea and vomiting.   Genitourinary:  Positive for genital sores and vaginal pain (itching, dryness). Negative for dysuria, frequency, hematuria, pelvic pain, urgency, vaginal bleeding and vaginal discharge.         The following portions of the patient's history were reviewed and updated as appropriate: allergies, current medications, past family history, past medical history, past social history, past surgical history, and problem list.      Objective:     Physical Exam  Vitals reviewed.   Constitutional:       Appearance: Normal appearance. She is not ill-appearing.   Abdominal:      Tenderness: There is no abdominal tenderness.   Genitourinary:     General: Normal vulva.      Labia:         Right: No rash, tenderness or lesion.         Left: No rash, tenderness or lesion.       Vagina: Normal. No vaginal discharge, erythema, tenderness, bleeding or lesions.      Cervix: Normal. No cervical motion tenderness, discharge, friability, lesion, erythema or cervical bleeding.      Comments: Vulva and perineum thoroughly inspected without any visible  herpetic lesions concerning rash.  Lymphadenopathy:      Lower Body: No right inguinal adenopathy. No left inguinal adenopathy.   Neurological:      Mental Status: She is alert and oriented to person, place, and time.   Psychiatric:         Mood and Affect: Mood normal.         Behavior: Behavior normal. Behavior is cooperative.

## 2024-08-22 LAB
CANDIDA RRNA VAG QL PROBE: NOT DETECTED
G VAGINALIS RRNA GENITAL QL PROBE: NOT DETECTED
T VAGINALIS RRNA GENITAL QL PROBE: NOT DETECTED

## 2024-08-27 ENCOUNTER — OFFICE VISIT (OUTPATIENT)
Dept: URGENT CARE | Facility: CLINIC | Age: 69
End: 2024-08-27
Payer: MEDICARE

## 2024-08-27 VITALS
OXYGEN SATURATION: 100 % | SYSTOLIC BLOOD PRESSURE: 146 MMHG | HEART RATE: 64 BPM | RESPIRATION RATE: 18 BRPM | TEMPERATURE: 96.7 F | DIASTOLIC BLOOD PRESSURE: 72 MMHG

## 2024-08-27 DIAGNOSIS — S20.364A TICK BITE OF MIDDLE FRONT WALL OF THORAX, INITIAL ENCOUNTER: Primary | ICD-10-CM
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
## 2024-08-27 PROCEDURE — 99213 OFFICE O/P EST LOW 20 MIN: CPT | Performed by: PHYSICIAN ASSISTANT

## 2024-08-27 PROCEDURE — G0463 HOSPITAL OUTPT CLINIC VISIT: HCPCS | Performed by: PHYSICIAN ASSISTANT

## 2024-08-27 RX ORDER — DOXYCYCLINE 100 MG/1
100 TABLET ORAL 2 TIMES DAILY
Qty: 2 TABLET | Refills: 0 | Status: SHIPPED | OUTPATIENT
Start: 2024-08-27 | End: 2024-08-28

## 2024-08-27 NOTE — PATIENT INSTRUCTIONS
Recommended exposure dose of doxycyline.     Follow up with PCP in 3-5 days.  Proceed to  ER if symptoms worsen.    If tests are performed, our office will contact you with results only if changes need to made to the care plan discussed with you at the visit. You can review your full results on [facility] My[facility].

## 2024-08-27 NOTE — PROGRESS NOTES
[facility]        NAME: Gertrude Bridges is a 68 y.o. female  : 1955    MRN: [id number]  DATE: 2024  TIME: 9:04 AM    Assessment and Plan   Tick bite of middle front wall of thorax, initial encounter [S20.364A, W57.XXXA]  1. Tick bite of middle front wall of thorax, initial encounter  doxycycline (ADOXA) 100 MG tablet            Patient Instructions     Patient Instructions   Recommended exposure dose of doxycyline.     Follow up with PCP in 3-5 days.  Proceed to  ER if symptoms worsen.    If tests are performed, our office will contact you with results only if changes need to made to the care plan discussed with you at the visit. You can review your full results on [facility].        Chief Complaint     Chief Complaint   Patient presents with    Bite     Pt c/o bug bite/ tick bite that started 3 days ago and started itching had black thing in there and came out easily not sure what it was. Works in [employer]. Previous hx of lymes.         History of Present Illness       Insect Bite  This is a new problem. Episode onset: 3 days ago. Associated symptoms include a rash. Pertinent negatives include no fatigue or fever. Nothing aggravates the symptoms. She has tried nothing for the symptoms.       Review of Systems   Review of Systems   Constitutional:  Negative for fatigue and fever.   Skin:  Positive for rash.   All other systems reviewed and are negative.        Current Medications       Current Outpatient Medications:     albuterol (ProAir HFA) 90 mcg/act inhaler, Inhale 2 puffs every 6 (six) hours as needed for wheezing, Disp: 8.5 g, Rfl: 0    albuterol (ProAir HFA) 90 mcg/act inhaler, Inhale 2 puffs every 6 (six) hours as needed for wheezing, Disp: 8.5 g, Rfl: 0    ascorbic acid (VITAMIN C) 500 mg tablet, Take 500 mg by mouth, Disp: , Rfl:     B Complex-Biotin-FA (HM VITAMIN B100 COMPLEX) TABS, Take by mouth, Disp: , Rfl:     benazepril (LOTENSIN) 40 MG tablet, Take 1 tablet  (40 mg total) by mouth daily, Disp: 90 tablet, Rfl: 3    betamethasone valerate (VALISONE) 0.1 % cream, APPLY TO AFFECTED AREA TOPICALLY EVERY DAY, Disp: 30 g, Rfl: 1    Calcium Carb-Cholecalciferol 600-800 MG-UNIT TABS, Take 1 tablet by mouth, Disp: , Rfl:     celecoxib (CeleBREX) 200 mg capsule, Take 1 capsule (200 mg total) by mouth daily, Disp: 90 capsule, Rfl: 1    clobetasol (TEMOVATE) 0.05 % cream, APPLY TOPICALLY TO AFFECTED AREA 2 X PER DAY, ARMS/LEGS FOR UP TO 2 WEEKS THEN 2-3 DAYS/WEEK, Disp: 30 g, Rfl: 5    doxycycline (ADOXA) 100 MG tablet, Take 1 tablet (100 mg total) by mouth 2 (two) times a day for 1 day, Disp: 2 tablet, Rfl: 0    EPINEPHrine (EPIPEN) 0.3 mg/0.3 mL SOAJ, Inject as directed, Disp: , Rfl:     LORazepam (Ativan) 0.5 mg tablet, Take 1 tablet (0.5 mg total) by mouth daily as needed for anxiety, Disp: 30 tablet, Rfl: 0    omeprazole (PriLOSEC) 20 mg delayed release capsule, TAKE 1 CAPSULE BY MOUTH EVERY DAY, Disp: 90 capsule, Rfl: 3    Turmeric 500 MG CAPS, Take by mouth, Disp: , Rfl:     valACYclovir (VALTREX) 1,000 mg tablet, Take 1 tablet (1,000 mg total) by mouth 2 (two) times a day, Disp: 180 tablet, Rfl: 0    Current Allergies     Allergies as of 08/27/2024 - Reviewed 08/27/2024   Allergen Reaction Noted    Bee venom Anaphylaxis 05/03/2009            The following portions of the patient's history were reviewed and updated as appropriate: allergies, current medications, past family history, past medical history, past social history, past surgical history and problem list.     Past Medical History:   Diagnosis Date    Abnormal mammogram     last assessed-8/12/2014    Allergic     Arthritis     Chest pain     last assessed-6/17/2013-non cardiac    Chronic maxillary sinusitis     last assessed-11/10/2015    COPD (chronic obstructive pulmonary disease) (HCC)     Fatty liver     last assessed-6/11/2014    GERD (gastroesophageal reflux disease)     Hypertension     Lichen planus     Lyme  disease     last assessed-6/17/2013    Raynaud's disease     Tick bites     last assessed-6/18/2013       Past Surgical History:   Procedure Laterality Date    ABDOMINAL SURGERY      EYE SURGERY Left     childhood       Family History   Problem Relation Age of Onset    Coronary artery disease Father     Cancer Father     Lung cancer Father     Cancer Maternal Aunt     Breast cancer Maternal Aunt          Medications have been verified.        Objective   /72   Pulse 64   Temp (!) 96.7 °F (35.9 °C) (Tympanic)   Resp 18   SpO2 100%        Physical Exam     Physical Exam  Vitals and nursing note reviewed.   Constitutional:       Appearance: Normal appearance.   Skin:     General: Skin is warm and dry.      Comments: Bite diogenes on the chest wall with surrounding erythema. No obvious remaining tick parts.   Neurological:      General: No focal deficit present.      Mental Status: She is alert and oriented to person, place, and time.   Psychiatric:         Mood and Affect: Mood normal.         Behavior: Behavior normal.

## 2024-10-01 DIAGNOSIS — M25.562 CHRONIC PAIN OF BOTH KNEES: ICD-10-CM

## 2024-10-01 DIAGNOSIS — M25.561 CHRONIC PAIN OF BOTH KNEES: ICD-10-CM

## 2024-10-01 DIAGNOSIS — G89.29 CHRONIC PAIN OF BOTH KNEES: ICD-10-CM

## 2024-10-01 DIAGNOSIS — I10 HYPERTENSION, UNSPECIFIED TYPE: ICD-10-CM

## 2024-10-01 RX ORDER — CELECOXIB 200 MG/1
200 CAPSULE ORAL DAILY
Qty: 30 CAPSULE | Refills: 0 | Status: SHIPPED | OUTPATIENT
Start: 2024-10-01

## 2024-10-01 RX ORDER — BENAZEPRIL HYDROCHLORIDE 40 MG/1
40 TABLET ORAL DAILY
Qty: 30 TABLET | Refills: 0 | Status: SHIPPED | OUTPATIENT
Start: 2024-10-01

## 2024-10-01 NOTE — TELEPHONE ENCOUNTER
Patient needs an appointment. Please contact the patient to schedule an appointment. Courtesy refills provided.

## 2024-10-04 ENCOUNTER — OFFICE VISIT (OUTPATIENT)
Dept: URGENT CARE | Facility: CLINIC | Age: 69
End: 2024-10-04
Payer: MEDICARE

## 2024-10-04 VITALS
RESPIRATION RATE: 18 BRPM | BODY MASS INDEX: 29.13 KG/M2 | OXYGEN SATURATION: 99 % | TEMPERATURE: 97.8 F | SYSTOLIC BLOOD PRESSURE: 156 MMHG | WEIGHT: 186 LBS | HEART RATE: 61 BPM | DIASTOLIC BLOOD PRESSURE: 92 MMHG

## 2024-10-04 DIAGNOSIS — J06.9 ACUTE URI: Primary | ICD-10-CM

## 2024-10-04 PROCEDURE — 99213 OFFICE O/P EST LOW 20 MIN: CPT | Performed by: NURSE PRACTITIONER

## 2024-10-04 PROCEDURE — G0463 HOSPITAL OUTPT CLINIC VISIT: HCPCS | Performed by: NURSE PRACTITIONER

## 2024-10-04 RX ORDER — DEXTROMETHORPHAN HYDROBROMIDE AND PROMETHAZINE HYDROCHLORIDE 15; 6.25 MG/5ML; MG/5ML
5 SYRUP ORAL 4 TIMES DAILY PRN
Qty: 118 ML | Refills: 0 | Status: SHIPPED | OUTPATIENT
Start: 2024-10-04

## 2024-10-04 RX ORDER — AZITHROMYCIN 250 MG/1
TABLET, FILM COATED ORAL
Qty: 6 TABLET | Refills: 0 | Status: SHIPPED | OUTPATIENT
Start: 2024-10-04 | End: 2024-10-04 | Stop reason: CLARIF

## 2024-10-04 NOTE — PROGRESS NOTES
[facility]        NAME: Gertrude Bridges is a 69 y.o. female  : 1955    MRN: [id number]  DATE: 2024  TIME: 8:34 AM    Assessment and Plan   Acute URI [J06.9]  1. Acute URI  promethazine-dextromethorphan (PHENERGAN-DM) 6.25-15 mg/5 mL oral syrup    amoxicillin-clavulanate (AUGMENTIN) 875-125 mg per tablet    DISCONTINUED: azithromycin (ZITHROMAX) 250 mg tablet        Most likely viral infection, advise conservative measures. However patient requesting antibiotics. Can take Augmentin and promethazine as directed.   Vitamin D3 2000 IU daily  Vitamin C 1000mg twice per day  Multivitamin daily  Some studies suggest that Zinc 12.5-15mg every 2 hours while awake x 5 days may shorten the duration cold symptoms by 1-2 days.   Fluids and rest  Nasal saline spray; Afrin if severe congestion (do not use for more than 3 days)  Over the counter decongestant  Tylenol/Ibuprofen for pain/fever  Salt water gargles and chloraseptic spray  Throat Coat Tea  Warm compresses over sinuses  Steam treatment (utilize proper safety precautions when in contact with hot water/steam)       Patient Instructions       Follow up with PCP in 3-5 days.  Proceed to  ER if symptoms worsen.    If tests are performed, our office will contact you with results only if changes need to made to the care plan discussed with you at the visit. You can review your full results on [facility].    Chief Complaint     Chief Complaint   Patient presents with    Sore Throat     Pt c/o sore throat cough congestion that has been going on for 8/9 days and has not been getting better with otc meds         History of Present Illness       Sore Throat   This is a new problem. The current episode started 1 to 4 weeks ago (9 days). The problem has been unchanged. There has been no fever. Associated symptoms include congestion and coughing (dry). Pertinent negatives include no abdominal pain, diarrhea, drooling, ear discharge, ear pain,  headaches, hoarse voice, plugged ear sensation, neck pain, shortness of breath, stridor, swollen glands, trouble swallowing or vomiting. She has tried acetaminophen and NSAIDs for the symptoms. The treatment provided no relief.       Review of Systems   Review of Systems   Constitutional:  Positive for fatigue. Negative for chills and fever.   HENT:  Positive for congestion, postnasal drip, rhinorrhea and sore throat. Negative for drooling, ear discharge, ear pain, hoarse voice and trouble swallowing.    Respiratory:  Positive for cough (dry). Negative for shortness of breath and stridor.    Gastrointestinal:  Negative for abdominal pain, diarrhea and vomiting.   Musculoskeletal:  Negative for neck pain.   Neurological:  Negative for headaches.         Current Medications       Current Outpatient Medications:     albuterol (ProAir HFA) 90 mcg/act inhaler, Inhale 2 puffs every 6 (six) hours as needed for wheezing, Disp: 8.5 g, Rfl: 0    albuterol (ProAir HFA) 90 mcg/act inhaler, Inhale 2 puffs every 6 (six) hours as needed for wheezing, Disp: 8.5 g, Rfl: 0    amoxicillin-clavulanate (AUGMENTIN) 875-125 mg per tablet, Take 1 tablet by mouth every 12 (twelve) hours for 7 days, Disp: 14 tablet, Rfl: 0    ascorbic acid (VITAMIN C) 500 mg tablet, Take 500 mg by mouth, Disp: , Rfl:     B Complex-Biotin-FA (HM VITAMIN B100 COMPLEX) TABS, Take by mouth, Disp: , Rfl:     benazepril (LOTENSIN) 40 MG tablet, Take 1 tablet (40 mg total) by mouth daily, Disp: 30 tablet, Rfl: 0    betamethasone valerate (VALISONE) 0.1 % cream, APPLY TO AFFECTED AREA TOPICALLY EVERY DAY, Disp: 30 g, Rfl: 1    Calcium Carb-Cholecalciferol 600-800 MG-UNIT TABS, Take 1 tablet by mouth, Disp: , Rfl:     celecoxib (CeleBREX) 200 mg capsule, Take 1 capsule (200 mg total) by mouth daily, Disp: 30 capsule, Rfl: 0    clobetasol (TEMOVATE) 0.05 % cream, APPLY TOPICALLY TO AFFECTED AREA 2 X PER DAY, ARMS/LEGS FOR UP TO 2 WEEKS THEN 2-3 DAYS/WEEK, Disp: 30 g,  Rfl: 5    EPINEPHrine (EPIPEN) 0.3 mg/0.3 mL SOAJ, Inject as directed, Disp: , Rfl:     LORazepam (Ativan) 0.5 mg tablet, Take 1 tablet (0.5 mg total) by mouth daily as needed for anxiety, Disp: 30 tablet, Rfl: 0    omeprazole (PriLOSEC) 20 mg delayed release capsule, TAKE 1 CAPSULE BY MOUTH EVERY DAY, Disp: 90 capsule, Rfl: 3    promethazine-dextromethorphan (PHENERGAN-DM) 6.25-15 mg/5 mL oral syrup, Take 5 mL by mouth 4 (four) times a day as needed for cough, Disp: 118 mL, Rfl: 0    Turmeric 500 MG CAPS, Take by mouth, Disp: , Rfl:     valACYclovir (VALTREX) 1,000 mg tablet, Take 1 tablet (1,000 mg total) by mouth 2 (two) times a day, Disp: 180 tablet, Rfl: 0    Current Allergies     Allergies as of 10/04/2024 - Reviewed 10/04/2024   Allergen Reaction Noted    Bee venom Anaphylaxis 05/03/2009            The following portions of the patient's history were reviewed and updated as appropriate: allergies, current medications, past family history, past medical history, past social history, past surgical history and problem list.     Past Medical History:   Diagnosis Date    Abnormal mammogram     last assessed-8/12/2014    Allergic     Arthritis     Chest pain     last assessed-6/17/2013-non cardiac    Chronic maxillary sinusitis     last assessed-11/10/2015    COPD (chronic obstructive pulmonary disease) (HCC)     Fatty liver     last assessed-6/11/2014    GERD (gastroesophageal reflux disease)     Hypertension     Lichen planus     Lyme disease     last assessed-6/17/2013    Raynaud's disease     Tick bites     last assessed-6/18/2013       Past Surgical History:   Procedure Laterality Date    ABDOMINAL SURGERY      EYE SURGERY Left     childhood       Family History   Problem Relation Age of Onset    Coronary artery disease Father     Cancer Father     Lung cancer Father     Cancer Maternal Aunt     Breast cancer Maternal Aunt          Medications have been verified.        Objective   /92   Pulse 61   Temp  97.8 °F (36.6 °C) (Tympanic)   Resp 18   Wt 84.4 kg (186 lb)   SpO2 99%   BMI 29.13 kg/m²        Physical Exam     Physical Exam  Vitals and nursing note reviewed.   Constitutional:       General: She is not in acute distress.     Appearance: Normal appearance. She is not ill-appearing.   HENT:      Head: Normocephalic and atraumatic.      Right Ear: Tympanic membrane and ear canal normal.      Left Ear: Tympanic membrane and ear canal normal.      Nose: Congestion and rhinorrhea present.      Mouth/Throat:      Mouth: Mucous membranes are moist.      Pharynx: Posterior oropharyngeal erythema present. No oropharyngeal exudate.   Eyes:      Pupils: Pupils are equal, round, and reactive to light.   Cardiovascular:      Rate and Rhythm: Normal rate and regular rhythm.      Pulses: Normal pulses.      Heart sounds: Normal heart sounds.   Pulmonary:      Effort: Pulmonary effort is normal.      Breath sounds: Normal breath sounds.   Musculoskeletal:      Cervical back: Normal range of motion and neck supple.   Skin:     General: Skin is warm and dry.   Neurological:      Mental Status: She is alert.

## 2024-10-05 DIAGNOSIS — J06.9 ACUTE URI: Primary | ICD-10-CM

## 2024-10-05 RX ORDER — AZITHROMYCIN 250 MG/1
TABLET, FILM COATED ORAL
Qty: 6 TABLET | Refills: 0 | Status: SHIPPED | OUTPATIENT
Start: 2024-10-05 | End: 2024-10-09

## 2024-10-05 NOTE — PROGRESS NOTES
Patient called back requesing switch to zpack from augmentin due to GI upset after patient refused zpack at the original appt.

## 2024-10-26 DIAGNOSIS — I10 HYPERTENSION, UNSPECIFIED TYPE: ICD-10-CM

## 2024-10-28 RX ORDER — BENAZEPRIL HYDROCHLORIDE 40 MG/1
40 TABLET ORAL DAILY
Qty: 90 TABLET | Refills: 1 | Status: SHIPPED | OUTPATIENT
Start: 2024-10-28

## 2024-10-29 DIAGNOSIS — M25.561 CHRONIC PAIN OF BOTH KNEES: ICD-10-CM

## 2024-10-29 DIAGNOSIS — G89.29 CHRONIC PAIN OF BOTH KNEES: ICD-10-CM

## 2024-10-29 DIAGNOSIS — M25.562 CHRONIC PAIN OF BOTH KNEES: ICD-10-CM

## 2024-10-29 RX ORDER — CELECOXIB 200 MG/1
200 CAPSULE ORAL DAILY
Qty: 30 CAPSULE | Refills: 0 | Status: SHIPPED | OUTPATIENT
Start: 2024-10-29

## 2024-12-09 DIAGNOSIS — G89.29 CHRONIC PAIN OF BOTH KNEES: ICD-10-CM

## 2024-12-09 DIAGNOSIS — M25.562 CHRONIC PAIN OF BOTH KNEES: ICD-10-CM

## 2024-12-09 DIAGNOSIS — M25.561 CHRONIC PAIN OF BOTH KNEES: ICD-10-CM

## 2024-12-09 RX ORDER — CELECOXIB 200 MG/1
200 CAPSULE ORAL DAILY
Qty: 30 CAPSULE | Refills: 0 | Status: SHIPPED | OUTPATIENT
Start: 2024-12-09

## 2024-12-13 DIAGNOSIS — Z12.11 ENCOUNTER FOR SCREENING COLONOSCOPY: ICD-10-CM

## 2024-12-13 DIAGNOSIS — E78.5 HYPERLIPIDEMIA, UNSPECIFIED HYPERLIPIDEMIA TYPE: ICD-10-CM

## 2024-12-13 DIAGNOSIS — L20.9 ATOPIC DERMATITIS, UNSPECIFIED TYPE: ICD-10-CM

## 2024-12-13 DIAGNOSIS — Z12.39 ENCOUNTER FOR OTHER SCREENING FOR MALIGNANT NEOPLASM OF BREAST: ICD-10-CM

## 2024-12-13 DIAGNOSIS — K21.9 GASTROESOPHAGEAL REFLUX DISEASE: ICD-10-CM

## 2024-12-13 DIAGNOSIS — R73.09 ELEVATED GLUCOSE: ICD-10-CM

## 2024-12-13 DIAGNOSIS — E55.9 VITAMIN D DEFICIENCY: ICD-10-CM

## 2024-12-13 DIAGNOSIS — K21.9 GASTROESOPHAGEAL REFLUX DISEASE WITHOUT ESOPHAGITIS: ICD-10-CM

## 2024-12-13 DIAGNOSIS — B00.9 HERPES SIMPLEX INFECTION: ICD-10-CM

## 2024-12-13 DIAGNOSIS — L43.9 LP (LICHEN PLANUS): ICD-10-CM

## 2024-12-13 DIAGNOSIS — I10 HYPERTENSION, UNSPECIFIED TYPE: ICD-10-CM

## 2025-01-06 DIAGNOSIS — M25.561 CHRONIC PAIN OF BOTH KNEES: ICD-10-CM

## 2025-01-06 DIAGNOSIS — M25.562 CHRONIC PAIN OF BOTH KNEES: ICD-10-CM

## 2025-01-06 DIAGNOSIS — G89.29 CHRONIC PAIN OF BOTH KNEES: ICD-10-CM

## 2025-01-07 RX ORDER — CELECOXIB 200 MG/1
200 CAPSULE ORAL DAILY
Qty: 30 CAPSULE | Refills: 0 | Status: SHIPPED | OUTPATIENT
Start: 2025-01-07

## 2025-01-14 DIAGNOSIS — L20.9 ATOPIC DERMATITIS, UNSPECIFIED TYPE: ICD-10-CM

## 2025-01-14 DIAGNOSIS — L43.9 LP (LICHEN PLANUS): ICD-10-CM

## 2025-01-15 DIAGNOSIS — L43.9 LP (LICHEN PLANUS): ICD-10-CM

## 2025-01-15 DIAGNOSIS — L20.9 ATOPIC DERMATITIS, UNSPECIFIED TYPE: ICD-10-CM

## 2025-01-15 RX ORDER — CLOBETASOL PROPIONATE 0.5 MG/G
CREAM TOPICAL
Qty: 30 G | Refills: 5 | Status: SHIPPED | OUTPATIENT
Start: 2025-01-15

## 2025-01-16 RX ORDER — BETAMETHASONE VALERATE 1.2 MG/G
CREAM TOPICAL DAILY
Qty: 30 G | Refills: 1 | Status: SHIPPED | OUTPATIENT
Start: 2025-01-16

## 2025-01-23 ENCOUNTER — RA CDI HCC (OUTPATIENT)
Dept: OTHER | Facility: HOSPITAL | Age: 70
End: 2025-01-23

## 2025-01-23 NOTE — PROGRESS NOTES
HCC coding opportunities       Chart reviewed, no opportunity found: CHART REVIEWED, NO OPPORTUNITY FOUND        Patients Insurance     Medicare Insurance: Medicare          HCC coding opportunities          Chart Reviewed number of suggestions sent to Provider: 11     Patients Insurance     Medicare Insurance: Medicare

## 2025-02-10 DIAGNOSIS — G89.29 CHRONIC PAIN OF BOTH KNEES: ICD-10-CM

## 2025-02-10 DIAGNOSIS — M25.562 CHRONIC PAIN OF BOTH KNEES: ICD-10-CM

## 2025-02-10 DIAGNOSIS — M25.561 CHRONIC PAIN OF BOTH KNEES: ICD-10-CM

## 2025-02-11 RX ORDER — CELECOXIB 200 MG/1
200 CAPSULE ORAL DAILY
Qty: 30 CAPSULE | Refills: 0 | Status: SHIPPED | OUTPATIENT
Start: 2025-02-11

## 2025-02-11 NOTE — TELEPHONE ENCOUNTER
Patient is scheduled for an appointment. Courtesy refill provided.    3/31/25      Patient needs updated blood work and has previously placed orders. Please contact patient to go for labs. Courtesy refill provided.

## 2025-03-31 ENCOUNTER — OFFICE VISIT (OUTPATIENT)
Dept: INTERNAL MEDICINE CLINIC | Facility: CLINIC | Age: 70
End: 2025-03-31
Payer: MEDICARE

## 2025-03-31 VITALS
BODY MASS INDEX: 30.23 KG/M2 | HEIGHT: 67 IN | DIASTOLIC BLOOD PRESSURE: 70 MMHG | WEIGHT: 192.6 LBS | SYSTOLIC BLOOD PRESSURE: 104 MMHG | RESPIRATION RATE: 18 BRPM | OXYGEN SATURATION: 98 % | HEART RATE: 61 BPM

## 2025-03-31 DIAGNOSIS — E55.9 VITAMIN D DEFICIENCY: ICD-10-CM

## 2025-03-31 DIAGNOSIS — Z12.31 ENCOUNTER FOR SCREENING MAMMOGRAM FOR BREAST CANCER: ICD-10-CM

## 2025-03-31 DIAGNOSIS — I10 PRIMARY HYPERTENSION: ICD-10-CM

## 2025-03-31 DIAGNOSIS — Z78.0 POSTMENOPAUSAL: ICD-10-CM

## 2025-03-31 DIAGNOSIS — E78.2 MIXED HYPERLIPIDEMIA: ICD-10-CM

## 2025-03-31 DIAGNOSIS — F41.9 ANXIETY DISORDER, UNSPECIFIED TYPE: ICD-10-CM

## 2025-03-31 DIAGNOSIS — Z00.00 MEDICARE ANNUAL WELLNESS VISIT, SUBSEQUENT: Primary | ICD-10-CM

## 2025-03-31 DIAGNOSIS — R91.1 PULMONARY NODULE: ICD-10-CM

## 2025-03-31 DIAGNOSIS — R07.89 CHEST DISCOMFORT: ICD-10-CM
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
## 2025-03-31 DIAGNOSIS — R73.09 ELEVATED GLUCOSE: ICD-10-CM

## 2025-03-31 PROCEDURE — G2211 COMPLEX E/M VISIT ADD ON: HCPCS | Performed by: INTERNAL MEDICINE

## 2025-03-31 PROCEDURE — G0438 PPPS, INITIAL VISIT: HCPCS | Performed by: INTERNAL MEDICINE

## 2025-03-31 PROCEDURE — 99214 OFFICE O/P EST MOD 30 MIN: CPT | Performed by: INTERNAL MEDICINE

## 2025-03-31 NOTE — PROGRESS NOTES
Name: Gertrude Bridges      : 1955      MRN: [id number]  Encounter Provider: Peter Fernandez MD  Encounter Date: 3/31/2025   Encounter department: [facility] INTERNAL MEDICINE [place]    Assessment & Plan  Medicare annual wellness visit, subsequent  Completed.       Primary hypertension  Controlled. Continue current regimen.  Orders:    CBC and differential; Future    Comprehensive metabolic panel; Future    Anxiety disorder, unspecified type  Takes ativan as needed, her  recently had open heart surgery.       Chest discomfort  Left upper chest wall, feels like a flutter, stopped after she stopped her Celebrex. Denies sob, palpitations, reports episodes occur when she works herself up, her  recently had open heart surgery.  Orders:    ECG 12 lead; Future    Postmenopausal    Orders:    DXA bone density spine hip and pelvis; Future    Elevated glucose  Check A1c.  Orders:    Hemoglobin A1C; Future    Vitamin D deficiency  Check vit D.  Orders:    Vitamin D 25 hydroxy; Future    Mixed hyperlipidemia  Check lipid.  Orders:    Lipid Panel with Direct LDL reflex; Future    Tick bite, unspecified site, sequela    Orders:    Lyme Total AB W Reflex to IGM/IGG; Future    Pulmonary nodule    Orders:    CT chest wo contrast; Future      Depression Screening and Follow-up Plan: Patient was screened for depression during today's encounter. They screened negative with a PHQ-9 score of 0.        Preventive health issues were discussed with patient, and age appropriate screening tests were ordered as noted in patient's After Visit Summary. Personalized health advice and appropriate referrals for health education or preventive services given if needed, as noted in patient's After Visit Summary.    History of Present Illness     Patient is here for routine follow up, reviewed chronic medical problems.       Patient Care Team:  Peter Fernandez MD as PCP - General (Internal Medicine)  Frankie Angelo DO  as PCP - PCP-[facility] Attributed-Roster    Review of Systems   Constitutional:  Negative for chills and fever.   HENT:  Negative for ear pain and sore throat.    Eyes:  Negative for pain and visual disturbance.   Respiratory:  Negative for cough and shortness of breath.    Cardiovascular:  Negative for chest pain and palpitations.   Gastrointestinal:  Negative for abdominal pain and vomiting.   Genitourinary:  Negative for dysuria and hematuria.   Musculoskeletal:  Negative for arthralgias and back pain.   Skin:  Negative for color change and rash.   Neurological:  Negative for seizures and syncope.   All other systems reviewed and are negative.    Medical History Reviewed by provider this encounter:  Tobacco  Allergies  Meds  Problems  Med Hx  Surg Hx  Fam Hx       Annual Wellness Visit Questionnaire   Gertrude is here for her Subsequent Wellness visit. Last Medicare Wellness visit information reviewed, patient interviewed and updates made to the record today.      Health Risk Assessment:   Patient rates overall health as good. Patient feels that their physical health rating is same. Patient is satisfied with their life. Eyesight was rated as same. Hearing was rated as same. Patient feels that their emotional and mental health rating is same. Patients states they are never, rarely angry. Patient states they are sometimes unusually tired/fatigued. Pain experienced in the last 7 days has been none. Patient states that she has experienced no weight loss or gain in last 6 months.     Depression Screening:   PHQ-9 Score: 0      Fall Risk Screening:   In the past year, patient has experienced: no history of falling in past year      Urinary Incontinence Screening:   Patient has not leaked urine accidently in the last six months.     Home Safety:  Patient does not have trouble with stairs inside or outside of their home. Patient has working smoke alarms and has working carbon monoxide detector. Home safety  hazards include: none.     Nutrition:   Current diet is Regular.     Medications:   Patient is currently taking over-the-counter supplements. OTC medications include: see medication list. Patient is able to manage medications.     Activities of Daily Living (ADLs)/Instrumental Activities of Daily Living (IADLs):   Walk and transfer into and out of bed and chair?: Yes  Dress and groom yourself?: Yes    Bathe or shower yourself?: Yes    Feed yourself? Yes  Do your laundry/housekeeping?: Yes  Manage your money, pay your bills and track your expenses?: Yes  Make your own meals?: Yes    Do your own shopping?: Yes    Previous Hospitalizations:   Any hospitalizations or ED visits within the last 12 months?: No      Advance Care Planning:   Living will: No    Advanced directive: No      Cognitive Screening:   Provider or family/friend/caregiver concerned regarding cognition?: No    PREVENTIVE SCREENINGS      Cardiovascular Screening:    General: History Lipid Disorder and Risks and Benefits Discussed    Due for: Lipid Panel      Diabetes Screening:     General: Risks and Benefits Discussed    Due for: Blood Glucose      Colorectal Cancer Screening:     General: Screening Current      Breast Cancer Screening:     General: Risks and Benefits Discussed    Due for: Mammogram        Cervical Cancer Screening:    General: Screening Not Indicated      Osteoporosis Screening:    General: Risks and Benefits Discussed    Due for: DXA Axial      Abdominal Aortic Aneurysm (AAA) Screening:        General: Screening Not Indicated      Lung Cancer Screening:     General: Screening Not Indicated      Hepatitis C Screening:    General: Screening Current    Screening, Brief Intervention, and Referral to Treatment (SBIRT)     Screening  Typical number of drinks in a day: 0  Typical number of drinks in a week: 0  Interpretation: Low risk drinking behavior.    AUDIT-C Screenin) How often did you have a drink containing alcohol in the past  "year? never  2) How many drinks did you have on a typical day when you were drinking in the past year? 0  3) How often did you have 6 or more drinks on one occasion in the past year? never    AUDIT-C Score: 0  Interpretation: Score 0-2 (female): Negative screen for alcohol misuse    Single Item Drug Screening:  How often have you used an illegal drug (including marijuana) or a prescription medication for non-medical reasons in the past year? never    Single Item Drug Screen Score: 0  Interpretation: Negative screen for possible drug use disorder    Brief Intervention  Alcohol & drug use screenings were reviewed. No concerns regarding substance use disorder identified.     Other Counseling Topics:   Car/seat belt/driving safety.     Social Drivers of Health     Financial Resource Strain: Low Risk  (10/2/2023)    Overall Financial Resource Strain (CARDIA)     Difficulty of Paying Living Expenses: Not hard at all   Food Insecurity: No Food Insecurity (3/31/2025)    Hunger Vital Sign     Worried About Running Out of Food in the Last Year: Never true     Ran Out of Food in the Last Year: Never true   Transportation Needs: No Transportation Needs (3/31/2025)    PRAPARE - Transportation     Lack of Transportation (Medical): No     Lack of Transportation (Non-Medical): No   Housing Stability: Unknown (3/31/2025)    Housing Stability Vital Sign     Unable to Pay for Housing in the Last Year: No     Homeless in the Last Year: No   Utilities: Not At Risk (3/31/2025)    [facility] Utilities     Threatened with loss of utilities: No     No results found.    Objective   /70 (BP Location: Left arm, Patient Position: Sitting, Cuff Size: Standard)   Pulse 61   Resp 18   Ht 5' 7\" (1.702 m)   Wt 87.4 kg (192 lb 9.6 oz)   SpO2 98%   BMI 30.17 kg/m²     Physical Exam  Vitals and nursing note reviewed.   Constitutional:       General: She is not in acute distress.     Appearance: She is well-developed.   HENT:      Head: " Normocephalic and atraumatic.   Cardiovascular:      Rate and Rhythm: Normal rate and regular rhythm.      Heart sounds: No murmur heard.  Pulmonary:      Effort: Pulmonary effort is normal. No respiratory distress.      Breath sounds: Normal breath sounds.   Abdominal:      Tenderness: There is no abdominal tenderness.   Musculoskeletal:         General: No swelling.      Cervical back: Neck supple.   Skin:     General: Skin is warm and dry.      Capillary Refill: Capillary refill takes less than 2 seconds.   Neurological:      Mental Status: She is alert.   Psychiatric:         Mood and Affect: Mood normal.

## 2025-03-31 NOTE — PATIENT INSTRUCTIONS
Medicare Preventive Visit Patient Instructions  Thank you for completing your Welcome to Medicare Visit or Medicare Annual Wellness Visit today. Your next wellness visit will be due in one year (4/1/2026).  The screening/preventive services that you may require over the next 5-10 years are detailed below. Some tests may not apply to you based off risk factors and/or age. Screening tests ordered at today's visit but not completed yet may show as past due. Also, please note that scanned in results may not display below.  Preventive Screenings:  Service Recommendations Previous Testing/Comments   Colorectal Cancer Screening  * Colonoscopy    * Fecal Occult Blood Test (FOBT)/Fecal Immunochemical Test (FIT)  * Fecal DNA/Cologuard Test  * Flexible Sigmoidoscopy Age: 45-75 years old   Colonoscopy: every 10 years (may be performed more frequently if at higher risk)  OR  FOBT/FIT: every 1 year  OR  Cologuard: every 3 years  OR  Sigmoidoscopy: every 5 years  Screening may be recommended earlier than age 45 if at higher risk for colorectal cancer. Also, an individualized decision between you and your healthcare provider will decide whether screening between the ages of 76-85 would be appropriate. Colonoscopy: 06/14/2005  FOBT/FIT: Not on file  Cologuard: 07/28/2023  Sigmoidoscopy: Not on file    Screening Current     Breast Cancer Screening Age: 40+ years old  Frequency: every 1-2 years  Not required if history of left and right mastectomy Mammogram: 03/12/2024    Screening Current   Cervical Cancer Screening Between the ages of 21-29, pap smear recommended once every 3 years.   Between the ages of 30-65, can perform pap smear with HPV co-testing every 5 years.   Recommendations may differ for women with a history of total hysterectomy, cervical cancer, or abnormal pap smears in past. Pap Smear: 07/29/2019    Screening Not Indicated   Hepatitis C Screening Once for adults born between 1945 and 1965  More frequently in patients  at high risk for Hepatitis C Hep C Antibody: 07/21/2021    Screening Current   Diabetes Screening 1-2 times per year if you're at risk for diabetes or have pre-diabetes Fasting glucose: 116 mg/dL (10/2/2023)  A1C: 5.9 % (10/2/2023)      Cholesterol Screening Once every 5 years if you don't have a lipid disorder. May order more often based on risk factors. Lipid panel: 10/02/2023    Screening Not Indicated  History Lipid Disorder     Other Preventive Screenings Covered by Medicare:  Abdominal Aortic Aneurysm (AAA) Screening: covered once if your at risk. You're considered to be at risk if you have a family history of AAA.  Lung Cancer Screening: covers low dose CT scan once per year if you meet all of the following conditions: (1) Age 55-77; (2) No signs or symptoms of lung cancer; (3) Current smoker or have quit smoking within the last 15 years; (4) You have a tobacco smoking history of at least 20 pack years (packs per day multiplied by number of years you smoked); (5) You get a written order from a healthcare provider.  Glaucoma Screening: covered annually if you're considered high risk: (1) You have diabetes OR (2) Family history of glaucoma OR (3)  aged 50 and older OR (4)  American aged 65 and older  Osteoporosis Screening: covered every 2 years if you meet one of the following conditions: (1) You're estrogen deficient and at risk for osteoporosis based off medical history and other findings; (2) Have a vertebral abnormality; (3) On glucocorticoid therapy for more than 3 months; (4) Have primary hyperparathyroidism; (5) On osteoporosis medications and need to assess response to drug therapy.   Last bone density test (DXA Scan): Not on file.  HIV Screening: covered annually if you're between the age of 15-65. Also covered annually if you are younger than 15 and older than 65 with risk factors for HIV infection. For pregnant patients, it is covered up to 3 times per  pregnancy.    Immunizations:  Immunization Recommendations   Influenza Vaccine Annual influenza vaccination during flu season is recommended for all persons aged >= 6 months who do not have contraindications   Pneumococcal Vaccine   * Pneumococcal conjugate vaccine = PCV13 (Prevnar 13), PCV15 (Vaxneuvance), PCV20 (Prevnar 20)  * Pneumococcal polysaccharide vaccine = PPSV23 (Pneumovax) Adults 19-65 yo with certain risk factors or if 65+ yo  If never received any pneumonia vaccine: recommend Prevnar 20 (PCV20)  Give PCV20 if previously received 1 dose of PCV13 or PPSV23   Hepatitis B Vaccine 3 dose series if at intermediate or high risk (ex: diabetes, end stage renal disease, liver disease)   Respiratory syncytial virus (RSV) Vaccine - COVERED BY MEDICARE PART D  * RSVPreF3 (Arexvy) CDC recommends that adults 60 years of age and older may receive a single dose of RSV vaccine using shared clinical decision-making (SCDM)   Tetanus (Td) Vaccine - COST NOT COVERED BY MEDICARE PART B Following completion of primary series, a booster dose should be given every 10 years to maintain immunity against tetanus. Td may also be given as tetanus wound prophylaxis.   Tdap Vaccine - COST NOT COVERED BY MEDICARE PART B Recommended at least once for all adults. For pregnant patients, recommended with each pregnancy.   Shingles Vaccine (Shingrix) - COST NOT COVERED BY MEDICARE PART B  2 shot series recommended in those 19 years and older who have or will have weakened immune systems or those 50 years and older     Health Maintenance Due:      Topic Date Due   • Breast Cancer Screening: Mammogram  03/12/2025   • Colorectal Cancer Screening  07/28/2026   • Hepatitis C Screening  Completed     Immunizations Due:      Topic Date Due   • Pneumococcal Vaccine: 65+ Years (1 of 1 - PCV) Never done   • COVID-19 Vaccine (5 - 2024-25 season) 09/01/2024     Advance Directives   What are advance directives?  Advance directives are legal documents  that state your wishes and plans for medical care. These plans are made ahead of time in case you lose your ability to make decisions for yourself. Advance directives can apply to any medical decision, such as the treatments you want, and if you want to donate organs.   What are the types of advance directives?  There are many types of advance directives, and each state has rules about how to use them. You may choose a combination of any of the following:  Living will:  This is a written record of the treatment you want. You can also choose which treatments you do not want, which to limit, and which to stop at a certain time. This includes surgery, medicine, IV fluid, and tube feedings.   Durable power of  for healthcare (DPAHC):  This is a written record that states who you want to make healthcare choices for you when you are unable to make them for yourself. This person, called a proxy, is usually a family member or a friend. You may choose more than 1 proxy.  Do not resuscitate (DNR) order:  A DNR order is used in case your heart stops beating or you stop breathing. It is a request not to have certain forms of treatment, such as CPR. A DNR order may be included in other types of advance directives.  Medical directive:  This covers the care that you want if you are in a coma, near death, or unable to make decisions for yourself. You can list the treatments you want for each condition. Treatment may include pain medicine, surgery, blood transfusions, dialysis, IV or tube feedings, and a ventilator (breathing machine).  Values history:  This document has questions about your views, beliefs, and how you feel and think about life. This information can help others choose the care that you would choose.  Why are advance directives important?  An advance directive helps you control your care. Although spoken wishes may be used, it is better to have your wishes written down. Spoken wishes can be misunderstood, or  not followed. Treatments may be given even if you do not want them. An advance directive may make it easier for your family to make difficult choices about your care.   Weight Management   Why it is important to manage your weight:  Being overweight increases your risk of health conditions such as heart disease, high blood pressure, type 2 diabetes, and certain types of cancer. It can also increase your risk for osteoarthritis, sleep apnea, and other respiratory problems. Aim for a slow, steady weight loss. Even a small amount of weight loss can lower your risk of health problems.  How to lose weight safely:  A safe and healthy way to lose weight is to eat fewer calories and get regular exercise. You can lose up about 1 pound a week by decreasing the number of calories you eat by 500 calories each day.   Healthy meal plan for weight management:  A healthy meal plan includes a variety of foods, contains fewer calories, and helps you stay healthy. A healthy meal plan includes the following:  Eat whole-grain foods more often.  A healthy meal plan should contain fiber. Fiber is the part of grains, fruits, and vegetables that is not broken down by your body. Whole-grain foods are healthy and provide extra fiber in your diet. Some examples of whole-grain foods are whole-wheat breads and pastas, oatmeal, brown rice, and bulgur.  Eat a variety of vegetables every day.  Include dark, leafy greens such as spinach, kale, kasi greens, and mustard greens. Eat yellow and orange vegetables such as carrots, sweet potatoes, and winter squash.   Eat a variety of fruits every day.  Choose fresh or canned fruit (canned in its own juice or light syrup) instead of juice. Fruit juice has very little or no fiber.  Eat low-fat dairy foods.  Drink fat-free (skim) milk or 1% milk. Eat fat-free yogurt and low-fat cottage cheese. Try low-fat cheeses such as mozzarella and other reduced-fat cheeses.  Choose meat and other protein foods that  are low in fat.  Choose beans or other legumes such as split peas or lentils. Choose fish, skinless poultry (chicken or turkey), or lean cuts of red meat (beef or pork). Before you cook meat or poultry, cut off any visible fat.   Use less fat and oil.  Try baking foods instead of frying them. Add less fat, such as margarine, sour cream, regular salad dressing and mayonnaise to foods. Eat fewer high-fat foods. Some examples of high-fat foods include french fries, doughnuts, ice cream, and cakes.  Eat fewer sweets.  Limit foods and drinks that are high in sugar. This includes candy, cookies, regular soda, and sweetened drinks.  Exercise:  Exercise at least 30 minutes per day on most days of the week. Some examples of exercise include walking, biking, dancing, and swimming. You can also fit in more physical activity by taking the stairs instead of the elevator or parking farther away from stores. Ask your healthcare provider about the best exercise plan for you.      © Copyright [employer] 2018 Information is for End User's use only and may not be sold, redistributed or otherwise used for commercial purposes. All illustrations and images included in CareNotes® are the copyrighted property of [employer]M., Inc[employer] or [employer]

## 2025-03-31 NOTE — ASSESSMENT & PLAN NOTE
Controlled. Continue current regimen.  Orders:    CBC and differential; Future    Comprehensive metabolic panel; Future

## 2025-06-01 ENCOUNTER — OFFICE VISIT (OUTPATIENT)
Dept: URGENT CARE | Facility: CLINIC | Age: 70
End: 2025-06-01
Payer: MEDICARE

## 2025-06-01 VITALS
SYSTOLIC BLOOD PRESSURE: 144 MMHG | OXYGEN SATURATION: 99 % | RESPIRATION RATE: 14 BRPM | HEART RATE: 56 BPM | DIASTOLIC BLOOD PRESSURE: 89 MMHG | TEMPERATURE: 97.1 F

## 2025-06-01 DIAGNOSIS — S10.96XA TICK BITE OF NECK, INITIAL ENCOUNTER: Primary | ICD-10-CM
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
## 2025-06-01 PROCEDURE — 99213 OFFICE O/P EST LOW 20 MIN: CPT | Performed by: NURSE PRACTITIONER

## 2025-06-01 PROCEDURE — G0463 HOSPITAL OUTPT CLINIC VISIT: HCPCS | Performed by: NURSE PRACTITIONER

## 2025-06-01 RX ORDER — DOXYCYCLINE 100 MG/1
200 TABLET ORAL ONCE
Qty: 2 TABLET | Refills: 0 | Status: SHIPPED | OUTPATIENT
Start: 2025-06-01 | End: 2025-06-01

## 2025-06-01 NOTE — PROGRESS NOTES
[facility]        NAME: Gertrude Bridges is a 69 y.o. female  : 1955    MRN: [id number]  DATE: 2025  TIME: 8:22 AM    Assessment and Plan   Tick bite of neck, initial encounter [S10.96XA, W57.XXXA]  1. Tick bite of neck, initial encounter  doxycycline (ADOXA) 100 MG tablet        Take one time preventative dose of doxycycline with food as directed. Follow up with PCP for any development of symptoms or follow up lyme testing. Currently asymptomatic and no bulls eye on exam.     Patient Instructions       Follow up with PCP in 3-5 days.  Proceed to  ER if symptoms worsen.    If tests are performed, our office will contact you with results only if changes need to made to the care plan discussed with you at the visit. [facility]    Chief Complaint     Chief Complaint   Patient presents with    Tick Bite     On neck, pulled off Friday, and wants to just be checked out, no symptoms         History of Present Illness       Removed a tick from her neck area two days ago. Denies symptoms currently.     Tick Bite  This is a new problem. The current episode started in the past 7 days (2 days ago). The problem has been unchanged. Pertinent negatives include no abdominal pain, anorexia, arthralgias, change in bowel habit, chest pain, chills, congestion, coughing, diaphoresis, fatigue, fever, headaches, joint swelling, myalgias, nausea, neck pain, numbness, rash, sore throat, swollen glands, urinary symptoms, vertigo, visual change, vomiting or weakness. Nothing aggravates the symptoms. She has tried nothing for the symptoms.       Review of Systems   Review of Systems   Constitutional:  Negative for chills, diaphoresis, fatigue and fever.   HENT:  Negative for congestion and sore throat.    Respiratory:  Negative for cough.    Cardiovascular:  Negative for chest pain.   Gastrointestinal:  Negative for abdominal pain, anorexia, change in bowel habit, nausea and  vomiting.   Musculoskeletal:  Negative for arthralgias, joint swelling, myalgias and neck pain.   Skin:  Negative for color change and rash.   Neurological:  Negative for dizziness, vertigo, syncope, facial asymmetry, weakness, light-headedness, numbness and headaches.   Psychiatric/Behavioral:  Negative for sleep disturbance.          Current Medications     Current Medications[1]    Current Allergies     Allergies as of 06/01/2025 - Reviewed 06/01/2025   Allergen Reaction Noted    Bee venom Anaphylaxis 05/03/2009    Amoxicillin-pot clavulanate Hives and Vomiting 03/31/2025            The following portions of the patient's history were reviewed and updated as appropriate: allergies, current medications, past family history, past medical history, past social history, past surgical history and problem list.     Past Medical History[2]    Past Surgical History[3]    Family History[4]      Medications have been verified.        Objective   /89   Pulse 56   Temp (!) 97.1 °F (36.2 °C)   Resp 14   SpO2 99%        Physical Exam     Physical Exam  Vitals and nursing note reviewed.   Constitutional:       General: She is not in acute distress.     Appearance: Normal appearance. She is not ill-appearing.   HENT:      Head: Normocephalic and atraumatic.     Cardiovascular:      Rate and Rhythm: Normal rate and regular rhythm.      Heart sounds: Normal heart sounds, S1 normal and S2 normal.   Pulmonary:      Effort: Pulmonary effort is normal. No accessory muscle usage.      Breath sounds: Normal breath sounds. No wheezing.     Skin:     General: Skin is warm and dry.      Capillary Refill: Capillary refill takes less than 2 seconds.      Findings: Erythema present. No rash.           Comments: Small bump similar to bug bite on left anterior neck area.      Neurological:      General: No focal deficit present.      Mental Status: She is alert and oriented to person, place, and time.      Motor: Motor function is  intact.     Psychiatric:         Attention and Perception: Attention and perception normal.         Mood and Affect: Mood and affect normal.         Speech: Speech normal.         Behavior: Behavior normal. Behavior is cooperative.         Thought Content: Thought content normal.                        [1]   Current Outpatient Medications:     albuterol (ProAir HFA) 90 mcg/act inhaler, Inhale 2 puffs every 6 (six) hours as needed for wheezing, Disp: 8.5 g, Rfl: 0    albuterol (ProAir HFA) 90 mcg/act inhaler, Inhale 2 puffs every 6 (six) hours as needed for wheezing, Disp: 8.5 g, Rfl: 0    ascorbic acid (VITAMIN C) 500 mg tablet, Take 500 mg by mouth, Disp: , Rfl:     B Complex-Biotin-FA (HM VITAMIN B100 COMPLEX) TABS, Take by mouth, Disp: , Rfl:     benazepril (LOTENSIN) 40 MG tablet, TAKE 1 TABLET BY MOUTH EVERY DAY, Disp: 90 tablet, Rfl: 1    betamethasone valerate (VALISONE) 0.1 % cream, Apply topically daily, Disp: 30 g, Rfl: 1    Calcium Carb-Cholecalciferol 600-800 MG-UNIT TABS, Take 1 tablet by mouth, Disp: , Rfl:     celecoxib (CeleBREX) 200 mg capsule, Take 1 capsule (200 mg total) by mouth daily, Disp: 30 capsule, Rfl: 0    clobetasol (TEMOVATE) 0.05 % cream, APPLY TOPICALLY TO AFFECTED AREA 2 X PER DAY, ARMS/LEGS FOR UP TO 2 WEEKS THEN 2-3 DAYS/WEEK, Disp: 30 g, Rfl: 5    doxycycline (ADOXA) 100 MG tablet, Take 2 tablets (200 mg total) by mouth 1 (one) time for 1 dose, Disp: 2 tablet, Rfl: 0    EPINEPHrine (EPIPEN) 0.3 mg/0.3 mL SOAJ, Inject as directed, Disp: , Rfl:     LORazepam (Ativan) 0.5 mg tablet, Take 1 tablet (0.5 mg total) by mouth daily as needed for anxiety, Disp: 30 tablet, Rfl: 0    omeprazole (PriLOSEC) 20 mg delayed release capsule, Take 1 capsule (20 mg total) by mouth daily, Disp: 90 capsule, Rfl: 1    Turmeric 500 MG CAPS, Take by mouth, Disp: , Rfl:     valACYclovir (VALTREX) 1,000 mg tablet, Take 1 tablet (1,000 mg total) by mouth 2 (two) times a day (Patient taking differently: Take  500 mg by mouth daily), Disp: 180 tablet, Rfl: 0  [2]   Past Medical History:  Diagnosis Date    Abnormal mammogram     last assessed-8/12/2014    Allergic     Arthritis     Chest pain     last assessed-6/17/2013-non cardiac    Chronic maxillary sinusitis     last assessed-11/10/2015    COPD (chronic obstructive pulmonary disease) (HCC)     Fatty liver     last assessed-6/11/2014    GERD (gastroesophageal reflux disease)     Hypertension     Lichen planus     Lyme disease     last assessed-6/17/2013    Raynaud's disease     Tick bites     last assessed-6/18/2013   [3]   Past Surgical History:  Procedure Laterality Date    ABDOMINAL SURGERY      EYE SURGERY Left     childhood   [4]   Family History  Problem Relation Name Age of Onset    Coronary artery disease Father norm perales     Cancer Father norm perales     Lung cancer Father norm perales     Cancer Maternal Aunt janee shama     Breast cancer Maternal Aunt janee sesay

## 2025-06-02 ENCOUNTER — DOCUMENTATION (OUTPATIENT)
Dept: ADMINISTRATIVE | Facility: OTHER | Age: 70
End: 2025-06-02

## 2025-06-02 NOTE — PROGRESS NOTES
06/02/25 1:35 PM    Patient was called after the Urgent Care visit ; a message was left for the patient to return the call    Thank you.  Harriett Holliday  [facility]          ALAINA Jensen  P Patient Reported Team         Blood pressure elevated  Appointment department: [facility]  Appointment provider: ALAINA Jensen  Blood pressure  06/01/25 0819 144/89  06/01/25 0815 144/89

## 2025-06-06 ENCOUNTER — PROBLEM (OUTPATIENT)
Dept: URBAN - METROPOLITAN AREA CLINIC 6 | Facility: CLINIC | Age: 70
End: 2025-06-06

## 2025-06-06 DIAGNOSIS — S05.01XD: ICD-10-CM

## 2025-06-06 PROCEDURE — 99213 OFFICE O/P EST LOW 20 MIN: CPT

## 2025-06-06 ASSESSMENT — VISUAL ACUITY
OS_SC: 20/40
OD_SC: 20/30-2

## 2025-06-06 ASSESSMENT — TONOMETRY
OD_IOP_MMHG: 14
OS_IOP_MMHG: 10

## 2025-06-10 DIAGNOSIS — B00.9 HERPES SIMPLEX INFECTION: ICD-10-CM

## 2025-06-10 DIAGNOSIS — I10 HYPERTENSION, UNSPECIFIED TYPE: ICD-10-CM

## 2025-06-10 RX ORDER — VALACYCLOVIR HYDROCHLORIDE 500 MG/1
500 TABLET, FILM COATED ORAL DAILY
Qty: 90 TABLET | Refills: 0 | Status: SHIPPED | OUTPATIENT
Start: 2025-06-10 | End: 2025-09-08

## 2025-06-10 RX ORDER — BENAZEPRIL HYDROCHLORIDE 40 MG/1
40 TABLET ORAL DAILY
Qty: 90 TABLET | Refills: 1 | Status: SHIPPED | OUTPATIENT
Start: 2025-06-10

## 2025-06-28 ENCOUNTER — OFFICE VISIT (OUTPATIENT)
Dept: URGENT CARE | Facility: CLINIC | Age: 70
End: 2025-06-28
Payer: MEDICARE

## 2025-06-28 VITALS
WEIGHT: 190 LBS | TEMPERATURE: 98 F | OXYGEN SATURATION: 98 % | BODY MASS INDEX: 29.76 KG/M2 | HEART RATE: 61 BPM | RESPIRATION RATE: 18 BRPM | SYSTOLIC BLOOD PRESSURE: 130 MMHG | DIASTOLIC BLOOD PRESSURE: 82 MMHG
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
## 2025-06-28 DIAGNOSIS — S70.362A TICK BITE OF LEFT THIGH, INITIAL ENCOUNTER: Primary | ICD-10-CM

## 2025-06-28 PROCEDURE — 99214 OFFICE O/P EST MOD 30 MIN: CPT | Performed by: PHYSICIAN ASSISTANT

## 2025-06-28 PROCEDURE — G0463 HOSPITAL OUTPT CLINIC VISIT: HCPCS | Performed by: PHYSICIAN ASSISTANT

## 2025-06-28 RX ORDER — DOXYCYCLINE 100 MG/1
200 CAPSULE ORAL ONCE
Qty: 2 CAPSULE | Refills: 0 | Status: SHIPPED | OUTPATIENT
Start: 2025-06-28 | End: 2025-06-28

## 2025-06-28 NOTE — PATIENT INSTRUCTIONS
Because patient is unsure how long the tick was on we will order 1 dose of prophylactic Doxycycline.  Advised patient to hold calcium while taking Doxy.  If tests have been performed at [facility], our office will contact you with results if changes need to be made to the care plan discussed with you at the visit.  [facility]  Follow up with PCP in 3-5 days.  Proceed to  ER if symptoms worsen.

## 2025-06-28 NOTE — PROGRESS NOTES
[facility]        NAME: Gertrude Bridges is a 69 y.o. female  : 1955    MRN: [id number]  DATE: 2025  TIME: 8:30 AM    Assessment and Plan   Tick bite of left thigh, initial encounter [S70.362A, W57.XXXA]  1. Tick bite of left thigh, initial encounter              Patient Instructions   Because patient is unsure how long the tick was on we will order 1 dose of prophylactic Doxycycline.  Advised patient to hold calcium while taking Doxy.  If tests have been performed at [facility], our office will contact you with results if changes need to be made to the care plan discussed with you at the visit.  You can review your full results on [facility]  Follow up with PCP in 3-5 days.  Proceed to  ER if symptoms worsen.    Chief Complaint     Chief Complaint   Patient presents with    Insect Bite     Tick bite, has had lyme's before.          History of Present Illness       Insect Bite  Associated symptoms include a rash. Pertinent negatives include no chest pain, fever or vomiting.     This is a 69-year-old female here complaining of a tick bite to her left inner thigh.  She notes she took the tick off about 7 PM last evening.  She is unsure if the tick was there greater than 36 hours.  She is also unsure if it was engorged.  She denies fever, shortness of breath, chest pain, vomiting or diarrhea.  She does note that there is a small welt on her inner left thigh and it is itchy.  Review of Systems   Review of Systems   Constitutional:  Negative for fever.   Respiratory:  Negative for shortness of breath.    Cardiovascular:  Negative for chest pain.   Gastrointestinal:  Negative for diarrhea and vomiting.   Skin:  Positive for rash.         Current Medications     Current Medications[1]    Current Allergies     Allergies as of 2025 - Reviewed 2025   Allergen Reaction Noted    Bee venom Anaphylaxis 2009    Amoxicillin-pot clavulanate Hives and Vomiting 2025            The  following portions of the patient's history were reviewed and updated as appropriate: allergies, current medications, past family history, past medical history, past social history, past surgical history and problem list.     Past Medical History[2]    Past Surgical History[3]    Family History[4]      Medications have been verified.        Objective   /82   Pulse 61   Temp 98 °F (36.7 °C)   Resp 18   Wt 86.2 kg (190 lb)   SpO2 98%   BMI 29.76 kg/m²        Physical Exam     Physical Exam  Vitals and nursing note reviewed.   Constitutional:       General: She is not in acute distress.     Appearance: Normal appearance. She is not ill-appearing, toxic-appearing or diaphoretic.     Cardiovascular:      Rate and Rhythm: Normal rate and regular rhythm.   Pulmonary:      Effort: Pulmonary effort is normal.      Breath sounds: Normal breath sounds.     Skin:     Comments: There is a 1 cm macule on the left inner thigh.  No erythema or edema.  There is no retained foreign body.     Neurological:      Mental Status: She is alert.                        [1]   Current Outpatient Medications:     albuterol (ProAir HFA) 90 mcg/act inhaler, Inhale 2 puffs every 6 (six) hours as needed for wheezing, Disp: 8.5 g, Rfl: 0    albuterol (ProAir HFA) 90 mcg/act inhaler, Inhale 2 puffs every 6 (six) hours as needed for wheezing, Disp: 8.5 g, Rfl: 0    ascorbic acid (VITAMIN C) 500 mg tablet, Take 500 mg by mouth, Disp: , Rfl:     B Complex-Biotin-FA (HM VITAMIN B100 COMPLEX) TABS, Take by mouth, Disp: , Rfl:     benazepril (LOTENSIN) 40 MG tablet, Take 1 tablet (40 mg total) by mouth daily, Disp: 90 tablet, Rfl: 1    betamethasone valerate (VALISONE) 0.1 % cream, Apply topically daily, Disp: 30 g, Rfl: 1    Calcium Carb-Cholecalciferol 600-800 MG-UNIT TABS, Take 1 tablet by mouth, Disp: , Rfl:     celecoxib (CeleBREX) 200 mg capsule, Take 1 capsule (200 mg total) by mouth daily, Disp: 30 capsule, Rfl: 0    clobetasol  (TEMOVATE) 0.05 % cream, APPLY TOPICALLY TO AFFECTED AREA 2 X PER DAY, ARMS/LEGS FOR UP TO 2 WEEKS THEN 2-3 DAYS/WEEK, Disp: 30 g, Rfl: 5    EPINEPHrine (EPIPEN) 0.3 mg/0.3 mL SOAJ, Inject as directed, Disp: , Rfl:     LORazepam (Ativan) 0.5 mg tablet, Take 1 tablet (0.5 mg total) by mouth daily as needed for anxiety, Disp: 30 tablet, Rfl: 0    omeprazole (PriLOSEC) 20 mg delayed release capsule, Take 1 capsule (20 mg total) by mouth daily, Disp: 90 capsule, Rfl: 1    Turmeric 500 MG CAPS, Take by mouth, Disp: , Rfl:     valACYclovir (VALTREX) 500 mg tablet, Take 1 tablet (500 mg total) by mouth in the morning., Disp: 90 tablet, Rfl: 0  [2]   Past Medical History:  Diagnosis Date    Abnormal mammogram     last assessed-8/12/2014    Allergic     Arthritis     Chest pain     last assessed-6/17/2013-non cardiac    Chronic maxillary sinusitis     last assessed-11/10/2015    COPD (chronic obstructive pulmonary disease) (HCC)     Fatty liver     last assessed-6/11/2014    GERD (gastroesophageal reflux disease)     Hypertension     Lichen planus     Lyme disease     last assessed-6/17/2013    Raynaud's disease     Tick bites     last assessed-6/18/2013   [3]   Past Surgical History:  Procedure Laterality Date    ABDOMINAL SURGERY      EYE SURGERY Left     childhood   [4]   Family History  Problem Relation Name Age of Onset    Coronary artery disease Father norm perales     Cancer Father norm perales     Lung cancer Father norm perales     Cancer Maternal Aunt janee dangelonikky     Breast cancer Maternal Aunt janee dangelonikky

## 2025-07-29 ENCOUNTER — OFFICE VISIT (OUTPATIENT)
Dept: INTERNAL MEDICINE CLINIC | Facility: CLINIC | Age: 70
End: 2025-07-29
Payer: MEDICARE

## 2025-07-29 VITALS
DIASTOLIC BLOOD PRESSURE: 80 MMHG | SYSTOLIC BLOOD PRESSURE: 136 MMHG | HEIGHT: 67 IN | OXYGEN SATURATION: 99 % | RESPIRATION RATE: 17 BRPM | WEIGHT: 190 LBS | BODY MASS INDEX: 29.82 KG/M2 | HEART RATE: 71 BPM

## 2025-07-29 DIAGNOSIS — R39.9 UTI SYMPTOMS: Primary | ICD-10-CM

## 2025-07-29 DIAGNOSIS — E53.8 VITAMIN B 12 DEFICIENCY: ICD-10-CM
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
## 2025-07-29 DIAGNOSIS — S70.362S TICK BITE OF LEFT THIGH, SEQUELA: ICD-10-CM

## 2025-07-29 PROCEDURE — 99214 OFFICE O/P EST MOD 30 MIN: CPT | Performed by: INTERNAL MEDICINE

## 2025-07-29 PROCEDURE — G2211 COMPLEX E/M VISIT ADD ON: HCPCS | Performed by: INTERNAL MEDICINE

## 2025-07-29 RX ORDER — NITROFURANTOIN 25; 75 MG/1; MG/1
100 CAPSULE ORAL 2 TIMES DAILY
Qty: 10 CAPSULE | Refills: 0 | Status: SHIPPED | OUTPATIENT
Start: 2025-07-29 | End: 2025-08-03

## 2025-07-29 RX ORDER — DOXYCYCLINE 100 MG/1
200 CAPSULE ORAL ONCE
Qty: 2 CAPSULE | Refills: 0 | Status: SHIPPED | OUTPATIENT
Start: 2025-07-29 | End: 2025-07-29

## 2025-08-08 ENCOUNTER — TELEPHONE (OUTPATIENT)
Age: 70
End: 2025-08-08

## 2025-08-08 DIAGNOSIS — N39.0 RECURRENT UTI: Primary | ICD-10-CM

## 2025-08-08 RX ORDER — NITROFURANTOIN 25; 75 MG/1; MG/1
100 CAPSULE ORAL 2 TIMES DAILY
Qty: 10 CAPSULE | Refills: 0 | Status: SHIPPED | OUTPATIENT
Start: 2025-08-08 | End: 2025-08-13

## 2025-08-13 ENCOUNTER — OFFICE VISIT (OUTPATIENT)
Dept: URGENT CARE | Facility: CLINIC | Age: 70
End: 2025-08-13
Payer: MEDICARE

## 2025-08-13 ENCOUNTER — HOSPITAL ENCOUNTER (EMERGENCY)
Facility: HOSPITAL | Age: 70
Discharge: HOME | End: 2025-08-13
Attending: EMERGENCY MEDICINE | Admitting: EMERGENCY MEDICINE
Payer: MEDICARE

## 2025-08-13 ENCOUNTER — APPOINTMENT (EMERGENCY)
Dept: RADIOLOGY | Facility: HOSPITAL | Age: 70
End: 2025-08-13
Payer: MEDICARE

## (undated) RX ORDER — CARBOXYMETHYLCELLULOSE SODIUM 5 MG/ML: 1 SOLUTION/ DROPS OPHTHALMIC

## (undated) RX ORDER — PREDNISOLONE ACETATE 10 MG/ML: 1 SUSPENSION/ DROPS OPHTHALMIC TWICE A DAY